# Patient Record
Sex: MALE | Race: WHITE | NOT HISPANIC OR LATINO | Employment: OTHER | URBAN - NONMETROPOLITAN AREA
[De-identification: names, ages, dates, MRNs, and addresses within clinical notes are randomized per-mention and may not be internally consistent; named-entity substitution may affect disease eponyms.]

---

## 2017-07-05 ENCOUNTER — HOSPITAL ENCOUNTER (OUTPATIENT)
Dept: RADIOLOGY | Facility: OTHER | Age: 57
End: 2017-07-05
Attending: FAMILY MEDICINE

## 2017-07-05 ENCOUNTER — HISTORY (OUTPATIENT)
Dept: FAMILY MEDICINE | Facility: OTHER | Age: 57
End: 2017-07-05

## 2017-07-05 ENCOUNTER — OFFICE VISIT - GICH (OUTPATIENT)
Dept: FAMILY MEDICINE | Facility: OTHER | Age: 57
End: 2017-07-05

## 2017-07-05 DIAGNOSIS — R05.9 COUGH: ICD-10-CM

## 2017-07-05 DIAGNOSIS — J20.9 ACUTE BRONCHITIS: ICD-10-CM

## 2017-12-07 ENCOUNTER — HISTORY (OUTPATIENT)
Dept: FAMILY MEDICINE | Facility: OTHER | Age: 57
End: 2017-12-07

## 2017-12-07 ENCOUNTER — HOSPITAL ENCOUNTER (OUTPATIENT)
Dept: RADIOLOGY | Facility: OTHER | Age: 57
End: 2017-12-07
Attending: NURSE PRACTITIONER

## 2017-12-07 ENCOUNTER — OFFICE VISIT - GICH (OUTPATIENT)
Dept: FAMILY MEDICINE | Facility: OTHER | Age: 57
End: 2017-12-07

## 2017-12-07 DIAGNOSIS — R05.9 COUGH: ICD-10-CM

## 2017-12-07 DIAGNOSIS — J40 BRONCHITIS: ICD-10-CM

## 2017-12-20 ENCOUNTER — AMBULATORY - GICH (OUTPATIENT)
Dept: ORTHOPEDICS | Facility: OTHER | Age: 57
End: 2017-12-20

## 2017-12-20 DIAGNOSIS — M25.511 PAIN IN RIGHT SHOULDER: ICD-10-CM

## 2017-12-27 NOTE — PROGRESS NOTES
"Patient Information     Patient Name MRN Denton Matthews [id number] Male 1960      Progress Notes by Marcus Londono MD at 2017  9:15 AM     Author:  Marcus Londono MD Service:  (none) Author Type:  Physician     Filed:  2017 10:18 AM Encounter Date:  2017 Status:  Signed     :  Marcus Londono MD (Physician)            SUBJECTIVE:  Denton Palacios is a 56 y.o. male who presents for evaluation of cough. Over the past couple of weeks he's noted a sore throat and chest congestion. This doesn't seem to bother him much during the day but when he lies down at night, he will develop a cough productive of green sputum. He denies fever or shortness of breath. Denies a history of seasonal allergies.    Allergies     Allergen  Reactions     Morphine Sulfate Nausea And Vomiting    and   Current Outpatient Prescriptions on File Prior to Visit       Medication  Sig Dispense Refill     ibuprofen (ADVIL; MOTRIN) 800 mg tablet Take 1 tablet by mouth 3 times daily if needed. 50 tablet 2     No current facility-administered medications on file prior to visit.        OBJECTIVE:  /76  Pulse 56  Temp 98.4  F (36.9  C) (Tympanic)  Ht 1.905 m (6' 3\")  Wt (!) 137.4 kg (303 lb)  BMI 37.87 kg/m2  EXAM:  General Appearance: Pleasant, alert, appropriate appearance for age. No acute distress  Eye Exam: Normal external eye, conjunctiva, lids, cornea. SHAHZAD.  Ear Exam: Normal TM's bilaterally. Normal auditory canals and external ears. Non-tender.  Nose Exam: Normal external nose, mucus membranes, and septum.  OroPharynx Exam: Dental hygiene adequate. Normal buccal mucosa. Normal pharynx.  Neck Exam: Supple, no masses or nodes.  Chest/Respiratory Exam: A few crackles noted at the bases, otherwise clear.    Chest x-ray shows increased peribronchial markings otherwise is normal.    ASSESSMENT/Plan :      ICD-10-CM    1. Acute bronchitis, unspecified organism  Elected to place on a course of " azithromycin. He will follow-up if not improving.  J20.9 azithromycin (ZITHROMAX) 250 mg tablet   2. Persistent cough R05 XR CHEST 2 VIEWS PA AND LATERAL       Marcus Londono MD

## 2017-12-30 NOTE — NURSING NOTE
Patient Information     Patient Name MRN Sex Denton Lepe [id number] Male 1960      Nursing Note by Melva Olmedo LPN at 2017  9:15 AM     Author:  Melva Olmedo LPN Service:  (none) Author Type:  NURS- Licensed Practical Nurse     Filed:  2017  9:45 AM Encounter Date:  2017 Status:  Signed     :  Melva Olmedo LPN (NURS- Licensed Practical Nurse)            Denton Palacios is a 56 y.o. male here today for a sore throat and cough that has been ongoing for the last couple weeks. Reports that he coughs mostly at night when he lays down and the phlegm is green in color.  Melva Olmedo LPN.........2017   9:13 AM

## 2018-01-02 ENCOUNTER — HISTORY (OUTPATIENT)
Dept: ORTHOPEDICS | Facility: OTHER | Age: 58
End: 2018-01-02

## 2018-01-02 ENCOUNTER — AMBULATORY - GICH (OUTPATIENT)
Dept: ORTHOPEDICS | Facility: OTHER | Age: 58
End: 2018-01-02

## 2018-01-04 ENCOUNTER — HISTORY (OUTPATIENT)
Dept: ORTHOPEDICS | Facility: OTHER | Age: 58
End: 2018-01-04

## 2018-01-04 ENCOUNTER — OFFICE VISIT - GICH (OUTPATIENT)
Dept: ORTHOPEDICS | Facility: OTHER | Age: 58
End: 2018-01-04

## 2018-01-04 ENCOUNTER — HOSPITAL ENCOUNTER (OUTPATIENT)
Dept: RADIOLOGY | Facility: OTHER | Age: 58
End: 2018-01-04
Attending: ORTHOPAEDIC SURGERY

## 2018-01-04 DIAGNOSIS — M75.81 OTHER SHOULDER LESIONS, RIGHT SHOULDER: ICD-10-CM

## 2018-01-04 DIAGNOSIS — M19.011 PRIMARY OSTEOARTHRITIS OF RIGHT SHOULDER: ICD-10-CM

## 2018-01-04 DIAGNOSIS — M25.511 PAIN IN RIGHT SHOULDER: ICD-10-CM

## 2018-01-04 DIAGNOSIS — M75.41 IMPINGEMENT SYNDROME OF RIGHT SHOULDER: ICD-10-CM

## 2018-01-16 ENCOUNTER — HOSPITAL ENCOUNTER (OUTPATIENT)
Dept: RADIOLOGY | Facility: OTHER | Age: 58
End: 2018-01-16
Attending: ORTHOPAEDIC SURGERY

## 2018-01-16 ENCOUNTER — COMMUNICATION - GICH (OUTPATIENT)
Dept: ORTHOPEDICS | Facility: OTHER | Age: 58
End: 2018-01-16

## 2018-01-16 DIAGNOSIS — M75.81 OTHER SHOULDER LESIONS, RIGHT SHOULDER: ICD-10-CM

## 2018-01-16 DIAGNOSIS — M75.41 IMPINGEMENT SYNDROME OF RIGHT SHOULDER: ICD-10-CM

## 2018-01-16 DIAGNOSIS — M19.011 PRIMARY OSTEOARTHRITIS OF RIGHT SHOULDER: ICD-10-CM

## 2018-01-22 ENCOUNTER — HISTORY (OUTPATIENT)
Dept: ORTHOPEDICS | Facility: OTHER | Age: 58
End: 2018-01-22

## 2018-01-22 ENCOUNTER — OFFICE VISIT - GICH (OUTPATIENT)
Dept: ORTHOPEDICS | Facility: OTHER | Age: 58
End: 2018-01-22

## 2018-01-22 ENCOUNTER — COMMUNICATION - GICH (OUTPATIENT)
Dept: FAMILY MEDICINE | Facility: OTHER | Age: 58
End: 2018-01-22

## 2018-01-22 DIAGNOSIS — M75.121 COMPLETE TEAR OF RIGHT ROTATOR CUFF: ICD-10-CM

## 2018-01-25 ENCOUNTER — OFFICE VISIT - GICH (OUTPATIENT)
Dept: FAMILY MEDICINE | Facility: OTHER | Age: 58
End: 2018-01-25

## 2018-01-25 ENCOUNTER — HISTORY (OUTPATIENT)
Dept: FAMILY MEDICINE | Facility: OTHER | Age: 58
End: 2018-01-25

## 2018-01-25 DIAGNOSIS — M75.41 IMPINGEMENT SYNDROME OF RIGHT SHOULDER: ICD-10-CM

## 2018-01-25 DIAGNOSIS — G47.30 SLEEP APNEA: ICD-10-CM

## 2018-01-25 DIAGNOSIS — M75.121 COMPLETE TEAR OF RIGHT ROTATOR CUFF: ICD-10-CM

## 2018-01-25 DIAGNOSIS — Z01.818 ENCOUNTER FOR OTHER PREPROCEDURAL EXAMINATION: ICD-10-CM

## 2018-01-25 DIAGNOSIS — M19.011 PRIMARY OSTEOARTHRITIS OF RIGHT SHOULDER: ICD-10-CM

## 2018-01-25 DIAGNOSIS — M75.81 OTHER SHOULDER LESIONS, RIGHT SHOULDER: ICD-10-CM

## 2018-01-25 LAB
A/G RATIO - HISTORICAL: 1.8 (ref 1–2)
ABSOLUTE BASOPHILS - HISTORICAL: 0.1 THOU/CU MM
ABSOLUTE EOSINOPHILS - HISTORICAL: 0.2 THOU/CU MM
ABSOLUTE IMMATURE GRANULOCYTES(METAS,MYELOS,PROS) - HISTORICAL: 0 THOU/CU MM
ABSOLUTE LYMPHOCYTES - HISTORICAL: 2.3 THOU/CU MM (ref 0.9–2.9)
ABSOLUTE MONOCYTES - HISTORICAL: 0.6 THOU/CU MM
ABSOLUTE NEUTROPHILS - HISTORICAL: 4.8 THOU/CU MM (ref 1.7–7)
ALBUMIN SERPL-MCNC: 4.6 G/DL (ref 3.5–5.7)
ALP SERPL-CCNC: 70 IU/L (ref 34–104)
ALT (SGPT) - HISTORICAL: 28 IU/L (ref 7–52)
ANION GAP - HISTORICAL: 10 (ref 5–18)
AST SERPL-CCNC: 18 IU/L (ref 13–39)
BASOPHILS # BLD AUTO: 0.6 %
BILIRUB SERPL-MCNC: 0.7 MG/DL (ref 0.3–1)
BUN SERPL-MCNC: 19 MG/DL (ref 7–25)
BUN/CREAT RATIO - HISTORICAL: 17
CALCIUM SERPL-MCNC: 9.4 MG/DL (ref 8.6–10.3)
CHLORIDE SERPLBLD-SCNC: 108 MMOL/L (ref 98–107)
CO2 SERPL-SCNC: 26 MMOL/L (ref 21–31)
CREAT SERPL-MCNC: 1.12 MG/DL (ref 0.7–1.3)
EOSINOPHIL NFR BLD AUTO: 2.7 %
ERYTHROCYTE [DISTWIDTH] IN BLOOD BY AUTOMATED COUNT: 13.5 % (ref 11.5–15.5)
GFR IF NOT AFRICAN AMERICAN - HISTORICAL: >60 ML/MIN/1.73M2
GLOBULIN - HISTORICAL: 2.6 G/DL (ref 2–3.7)
GLUCOSE SERPL-MCNC: 111 MG/DL (ref 70–105)
HCT VFR BLD AUTO: 47.7 % (ref 37–53)
HEMOGLOBIN: 16.2 G/DL (ref 13.5–17.5)
IMMATURE GRANULOCYTES(METAS,MYELOS,PROS) - HISTORICAL: 0.2 %
LYMPHOCYTES NFR BLD AUTO: 29.1 % (ref 20–44)
MCH RBC QN AUTO: 29.6 PG (ref 26–34)
MCHC RBC AUTO-ENTMCNC: 34 G/DL (ref 32–36)
MCV RBC AUTO: 87 FL (ref 80–100)
MONOCYTES NFR BLD AUTO: 7.3 %
NEUTROPHILS NFR BLD AUTO: 60.1 % (ref 42–72)
PLATELET # BLD AUTO: 233 THOU/CU MM (ref 140–440)
PMV BLD: 10.5 FL (ref 6.5–11)
POTASSIUM SERPL-SCNC: 4.2 MMOL/L (ref 3.5–5.1)
PROT SERPL-MCNC: 7.2 G/DL (ref 6.4–8.9)
RED BLOOD COUNT - HISTORICAL: 5.47 MIL/CU MM (ref 4.3–5.9)
SODIUM SERPL-SCNC: 144 MMOL/L (ref 133–143)
WHITE BLOOD COUNT - HISTORICAL: 8.1 THOU/CU MM (ref 4.5–11)

## 2018-01-25 ASSESSMENT — ANXIETY QUESTIONNAIRES
1. FEELING NERVOUS, ANXIOUS, OR ON EDGE: NOT AT ALL
4. TROUBLE RELAXING: NOT AT ALL
6. BECOMING EASILY ANNOYED OR IRRITABLE: NOT AT ALL
3. WORRYING TOO MUCH ABOUT DIFFERENT THINGS: NOT AT ALL
5. BEING SO RESTLESS THAT IT IS HARD TO SIT STILL: NOT AT ALL
7. FEELING AFRAID AS IF SOMETHING AWFUL MIGHT HAPPEN: NOT AT ALL
2. NOT BEING ABLE TO STOP OR CONTROL WORRYING: NOT AT ALL
GAD7 TOTAL SCORE: 0

## 2018-01-25 ASSESSMENT — PATIENT HEALTH QUESTIONNAIRE - PHQ9: SUM OF ALL RESPONSES TO PHQ QUESTIONS 1-9: 0

## 2018-01-26 ENCOUNTER — DOCUMENTATION ONLY (OUTPATIENT)
Dept: FAMILY MEDICINE | Facility: OTHER | Age: 58
End: 2018-01-26

## 2018-01-26 VITALS
SYSTOLIC BLOOD PRESSURE: 130 MMHG | BODY MASS INDEX: 37.67 KG/M2 | HEART RATE: 56 BPM | TEMPERATURE: 98.4 F | HEIGHT: 75 IN | WEIGHT: 303 LBS | DIASTOLIC BLOOD PRESSURE: 76 MMHG

## 2018-01-26 PROBLEM — M19.011 ARTHRITIS OF RIGHT ACROMIOCLAVICULAR JOINT: Status: ACTIVE | Noted: 2018-01-02

## 2018-01-26 PROBLEM — M75.81 RIGHT ROTATOR CUFF TENDINITIS: Status: ACTIVE | Noted: 2018-01-02

## 2018-01-26 PROBLEM — M75.41 IMPINGEMENT SYNDROME OF RIGHT SHOULDER: Status: ACTIVE | Noted: 2018-01-02

## 2018-01-26 RX ORDER — IBUPROFEN 800 MG/1
1 TABLET, FILM COATED ORAL 3 TIMES DAILY PRN
COMMUNITY
Start: 2013-06-13 | End: 2018-05-15

## 2018-02-01 ENCOUNTER — HISTORY (OUTPATIENT)
Dept: SURGERY | Facility: OTHER | Age: 58
End: 2018-02-01

## 2018-02-08 ENCOUNTER — HOSPITAL ENCOUNTER (OUTPATIENT)
Dept: SURGERY | Facility: OTHER | Age: 58
Discharge: HOME | End: 2018-02-08
Attending: ORTHOPAEDIC SURGERY | Admitting: ORTHOPAEDIC SURGERY

## 2018-02-08 ENCOUNTER — HISTORY (OUTPATIENT)
Dept: SURGERY | Facility: OTHER | Age: 58
End: 2018-02-08

## 2018-02-08 ENCOUNTER — SURGERY (OUTPATIENT)
Dept: SURGERY | Facility: OTHER | Age: 58
End: 2018-02-08

## 2018-02-08 ENCOUNTER — DOCUMENTATION ONLY (OUTPATIENT)
Dept: FAMILY MEDICINE | Facility: OTHER | Age: 58
End: 2018-02-08

## 2018-02-08 DIAGNOSIS — Z98.890 OTHER SPECIFIED POSTPROCEDURAL STATES: ICD-10-CM

## 2018-02-09 VITALS
DIASTOLIC BLOOD PRESSURE: 78 MMHG | OXYGEN SATURATION: 97 % | HEART RATE: 48 BPM | SYSTOLIC BLOOD PRESSURE: 126 MMHG | BODY MASS INDEX: 39.73 KG/M2 | HEIGHT: 74 IN | WEIGHT: 309.6 LBS

## 2018-02-09 VITALS — HEART RATE: 60 BPM | SYSTOLIC BLOOD PRESSURE: 136 MMHG | DIASTOLIC BLOOD PRESSURE: 74 MMHG

## 2018-02-09 VITALS
HEIGHT: 75 IN | DIASTOLIC BLOOD PRESSURE: 78 MMHG | WEIGHT: 312 LBS | BODY MASS INDEX: 38.79 KG/M2 | SYSTOLIC BLOOD PRESSURE: 132 MMHG | HEART RATE: 58 BPM | TEMPERATURE: 98.7 F

## 2018-02-09 VITALS — HEART RATE: 60 BPM | DIASTOLIC BLOOD PRESSURE: 90 MMHG | SYSTOLIC BLOOD PRESSURE: 142 MMHG

## 2018-02-10 ASSESSMENT — ANXIETY QUESTIONNAIRES: GAD7 TOTAL SCORE: 0

## 2018-02-10 ASSESSMENT — PATIENT HEALTH QUESTIONNAIRE - PHQ9: SUM OF ALL RESPONSES TO PHQ QUESTIONS 1-9: 0

## 2018-02-12 NOTE — NURSING NOTE
Patient Information     Patient Name MRN Denton Matthews [id number] Male 1960      Nursing Note by Heather Waldrop at 2018  9:45 AM     Author:  Heather Wladrop Service:  (none) Author Type:  (none)     Filed:  2018 10:21 AM Encounter Date:  2018 Status:  Signed     :  Heather Waldrop            Patient states BP is high this morning do to wife.  She got him wond up  Heather Waldrop LPN 2018 9:52 AM

## 2018-02-12 NOTE — PROGRESS NOTES
Patient Information     Patient Name MRN Sex Denton Lepe [id number] Male 1960      Progress Notes by Edward Jain DO at 2018  9:45 AM     Author:  Edward Jain DO Service:  (none) Author Type:  Physician     Filed:  2018 10:21 AM Encounter Date:  2018 Status:  Signed     :  Edward Jain DO (Physician)            PROGRESS NOTE    SUBJECTIVE:  Denton Palacios is here for evaluation in regards to his right shoulder. I previously saw the patient for elbow discomfort about 2 years ago. He comes in today after he fell onto his right shoulder . He came down on his elbow and jammed his shoulder. He thought it would get better but is continued to bother him. It's more painful especially when sleeping at night. He does utilize an occasional anti-inflammatory. Pain can be a dull ache at times more sharp. He is here today to discuss options. He is continue to work on his motion.    REVIEW OF SYSTEMS:  The review of systems as documented in the HPI and on the intake questionnaire completed by the patient on 2018 it has been reviewed by myself and the pertinent positives and negatives addressed.  The remainder of the complete review of systems was non-contributory.    OBJECTIVE:  /90 (Cuff Site: Right Arm, Position: Sitting, Cuff Size: Adult Large)  Pulse 60 There is no height or weight on file to calculate BMI.    General Appearance: Pleasant male in good appearance, mood and affect.  Alert and orientated times three ( time, date and location).    Skin: Intact.    Shoulder:  Motion: Patient does have full active forward flexion abduction is about 5  short passively it is full. Internal rotation to his back pocket with discomfort, external rotation to 45 .  Hawkin's Sign: positive.  Neer's Sign: positive.  Cross body adduction:  positive.  Drop arm test: negative.  Weakness at waist level: positive, slight difference when compared to the opposite  side.  Bicipital testing: negative.  Lift off test:  negative.  Hinojosa's test:  negative.    Elbow:  Flexion: Normal.  Extension: Normal.    Hand:  Thenar wasting: no.  Hypothenar wasting: no.  Sensation: Normal.  Radial and ulnar blood flow:  Normal.    Eyes: Pupils are round..    Ears: Hearing: Intact.    Heart: He has normal capillary refill into his hands pulses are regular.    Lungs: Distant breath sounds.     Radiographic images from 1/4 where independently reviewed and discussed with the patient.     X-rays do demonstrate a type II acromial hook, there is calcific tendinitis into the shoulder noted on the outlet view and he has narrowing of the acromioclavicular joint with significant osteophyte formation. Humeral head is in appropriate position.    ASSESSMENT:    Right small amount of calcific tendinitis.  Right impingement syndrome.  Right acromioclavicular arthritis.  Right rotator cuff tendinitis with possible partial tear.    PLAN:    I discussed conservative and surgical options with the patient at this time will work with further workup.  I will order an MR arthrogram of the shoulder.  He was instructed on wall walking and was able to perform.  I reviewed the poster board in model with the patient to help them understand impingement type symptoms in his pathology he verbalized an understanding.  He will follow up after above.  Questions and concerns were answered.    Edward Jain D.O.  Orthopaedic Surgeon    [facility]  [address] [place] [address]  Phone [phone] (KNEE)  Fax [phone]    This document was created using computer generated templates and voice activated software.    10:16 AM 1/4/2018

## 2018-02-12 NOTE — NURSING NOTE
Patient Information     Patient Name MRN Denton Matthews [id number] Male 1960      Nursing Note by Shanel Ochoa at 2017 10:30 AM     Author:  Shanel Ochoa Service:  (none) Author Type:  (none)     Filed:  2017 10:47 AM Encounter Date:  2017 Status:  Signed     :  Shanel Ochoa            Patient presents in the clinic with concerns of chest congestion, cough, sinus pressure, ear pain and ringing in the left ear. Patient states this has been occurring for 6-8 weeks. Patient states he has used OTC cold medications. Patient also uses a CPAP machine at home.  Shanel Ochoa LPN............................ 2017 10:38 AM

## 2018-02-12 NOTE — PROGRESS NOTES
Patient Information     Patient Name MRN Sex Denton Lepe [id number] Male 1960      Progress Notes by Tiffanie Ferreira NP at 2017 10:30 AM     Author:  Tiffanie Ferreira NP Service:  (none) Author Type:  PHYS- Nurse Practitioner     Filed:  2017 11:31 AM Encounter Date:  2017 Status:  Signed     :  Tiffanie Ferreira NP (PHYS- Nurse Practitioner)            HPI:    Denton Palacios is a 57 y.o. male who presents to clinic today for URI.   Cough, chest congestion, right ear plugged and ringing for the past 6-8 weeks.  Congested cough.  Occasionally productive.  Mild upper chest tightness.  Increasely feeling winded with stairs.  No shortness of breath with flat walking.  Denies any chest pain, tightness, or heaviness with activity.  No pain with coughing or deep breathing.  States symptoms seem to improve at night when using CPAP machine but then bothersome during the daytime.  Denies any lower extremity edema.   No fevers.  No chills or sweats.  No runny or stuffy nose.  Sore throat initially, resolved.  Infrequent occasional headache - occurs when not sleeping well and resolves with a short nap.  No body aches.  No change in appetite.  Energy level is the same.  Tried Nyquil the first week of symptoms, none recently.  No flu shot.            Past Medical History:     Diagnosis  Date     Olecranon bursitis of right elbow 2016     Pneumonia, organism unspecified(486) 2012     Past Surgical History:      Procedure  Laterality Date     LEG/ANKLE SURGERY  13    quadraceps tear times 2       Social History       Substance Use Topics         Smoking status:   Never Smoker     Smokeless tobacco:   Never Used     Alcohol use   No      Comment:        Current Outpatient Prescriptions       Medication  Sig Dispense Refill     ibuprofen (ADVIL; MOTRIN) 800 mg tablet Take 1 tablet by mouth 3 times daily if needed. 50 tablet 2     No current facility-administered medications for  "this visit.      Medications have been reviewed by me and are current to the best of my knowledge and ability.    Allergies     Allergen  Reactions     Morphine Sulfate Nausea And Vomiting       Past medical history, past surgical history, current medications and allergies reviewed and accurate to the best of my knowledge.        ROS:  Refer to HPI    /78  Pulse 58  Temp 98.7  F (37.1  C) (Tympanic)   Ht 1.905 m (6' 3\")  Wt (!) 141.5 kg (312 lb)  BMI 39 kg/m2    EXAM:  General Appearance: Well appearing adult male, appropriate appearance for age. No acute distress  Head: normocephalic, atraumatic  Ears: Left TM with bony landmarks appreciated, no erythema, no effusion, no bulging, no purulence.  Right TM with bony landmarks appreciated, no erythema, no effusion, no bulging, no purulence.   Left auditory canal clear.  Right auditory canal clear.  Normal external ears, non tender.  Eyes: conjunctivae normal without erythema or irritation, no drainage or crusting, no eyelid swelling, pupils equal   Orophayrnx: moist mucous membranes, posterior pharynx without erythema, tonsils without hypertrophy, no erythema, no exudates or petechiae, no post nasal drip seen.    Neck: supple without adenopathy  Respiratory: normal chest wall and respirations.  Normal effort.  Clear to auscultation bilaterally, no wheezing, crackles or rhonchi.  No increased work of breathing.  No cough appreciated.  Cardiac: RRR with no murmurs  Musculoskeletal:  Normal gait.  Equal movement of bilateral upper extremities.  Equal movement of bilateral lower extremities.    Dermatological: no rashes noted of exposed skin  Psychological: normal affect, alert and pleasant      Xray:  PROCEDURE:  XR CHEST 2 VIEWS PA AND LATERAL  HISTORY: Persistent cough.  COMPARISON:  07/05/2017, 11/24/2007  FINDINGS:   The cardiac silhouette is normal in size. The pulmonary vasculature is normal.  The lungs are clear. No pleural effusion or " pneumothorax.  IMPRESSION:  No acute cardiopulmonary disease.    Electronically Signed By: Stacie Whalen M.D. on 12/7/2017 11:20 AM      ASSESSMENT/PLAN:    ICD-10-CM    1. Persistent cough R05 XR CHEST 2 VIEWS PA AND LATERAL      azithromycin (ZITHROMAX Z-SHONNA) 250 mg tablet   2. Bronchitis J40 azithromycin (ZITHROMAX Z-SHONNA) 250 mg tablet         CXR completed and reviewed, no infiltrate appreciated, radiologist over read normal  Azithromycin daily x 5 days (z shonna dosing) for persistent cough/bronchitis   Encouraged fluids  Symptomatic treatment - salt water gargles, honey, elevation, humidifier, sinus rinse/netti pot, lozenges, mucinex, etc   Tylenol  PRN  Follow up with PCP  if symptoms persist or worsen or concerns          Patient Instructions   Azithromycin daily x 5 days       Most coughs are caused by a viral infection.   Usually coughs can last 2 to 3 weeks. Sometimes the cough becomes loose (wet) for a few days, and your child coughs up a lot of phlegm (mucus). This is usually a sign that the end of the illness is near.    Most sore throats are caused by viruses and are part of a cold. About 10% of sore throats are caused by strep bacteria.    Encouraged fluids and rest.    May use symptomatic care with tylenol or ibuprofen.     Using a humidifier works well to break up the congestion.     Elevate the mattress to 15 degrees in order to help with the congestion.    Frequent swallows of cool liquid.      Oatmeal or honey coats the throat and some patients find it soothes the pain.     Salt water gargles as needed    Return to clinic with change/worsening of symptoms or concerns.

## 2018-02-12 NOTE — PATIENT INSTRUCTIONS
Patient Information     Patient Name MRN Denton Matthews [id number] Male 1960      Patient Instructions by Tiffanie Ferreira NP at 2017 10:30 AM     Author:  Tiffanie Ferreira NP  Service:  (none) Author Type:  PHYS- Nurse Practitioner     Filed:  2017 11:19 AM  Encounter Date:  2017 Status:  Addendum     :  Tiffanie Ferreira NP (PHYS- Nurse Practitioner)        Related Notes: Original Note by Tiffanie Ferreira NP (PHYS- Nurse Practitioner) filed at 2017 11:18 AM            Azithromycin daily x 5 days       Most coughs are caused by a viral infection.   Usually coughs can last 2 to 3 weeks. Sometimes the cough becomes loose (wet) for a few days, and your child coughs up a lot of phlegm (mucus). This is usually a sign that the end of the illness is near.    Most sore throats are caused by viruses and are part of a cold. About 10% of sore throats are caused by strep bacteria.    Encouraged fluids and rest.    May use symptomatic care with tylenol or ibuprofen.     Using a humidifier works well to break up the congestion.     Elevate the mattress to 15 degrees in order to help with the congestion.    Frequent swallows of cool liquid.      Oatmeal or honey coats the throat and some patients find it soothes the pain.     Salt water gargles as needed    Return to clinic with change/worsening of symptoms or concerns.

## 2018-02-13 ENCOUNTER — HOSPITAL ENCOUNTER (OUTPATIENT)
Dept: PHYSICAL THERAPY | Facility: OTHER | Age: 58
Setting detail: THERAPIES SERIES
End: 2018-02-13
Attending: ORTHOPAEDIC SURGERY
Payer: MEDICARE

## 2018-02-13 ENCOUNTER — OFFICE VISIT (OUTPATIENT)
Dept: ORTHOPEDICS | Facility: OTHER | Age: 58
End: 2018-02-13
Attending: ORTHOPAEDIC SURGERY
Payer: MEDICARE

## 2018-02-13 VITALS
DIASTOLIC BLOOD PRESSURE: 80 MMHG | HEART RATE: 48 BPM | SYSTOLIC BLOOD PRESSURE: 162 MMHG | HEIGHT: 74 IN | BODY MASS INDEX: 38.5 KG/M2 | WEIGHT: 300 LBS

## 2018-02-13 DIAGNOSIS — Z98.890 S/P ARTHROSCOPY OF SHOULDER: Primary | ICD-10-CM

## 2018-02-13 PROCEDURE — 99024 POSTOP FOLLOW-UP VISIT: CPT | Performed by: ORTHOPAEDIC SURGERY

## 2018-02-13 PROCEDURE — 97161 PT EVAL LOW COMPLEX 20 MIN: CPT | Mod: GP | Performed by: PHYSICAL THERAPIST

## 2018-02-13 PROCEDURE — G0463 HOSPITAL OUTPT CLINIC VISIT: HCPCS

## 2018-02-13 PROCEDURE — G0463 HOSPITAL OUTPT CLINIC VISIT: HCPCS | Mod: 25

## 2018-02-13 PROCEDURE — G8985 CARRY GOAL STATUS: HCPCS | Mod: GP,CI | Performed by: PHYSICAL THERAPIST

## 2018-02-13 PROCEDURE — G8984 CARRY CURRENT STATUS: HCPCS | Mod: GP,CN | Performed by: PHYSICAL THERAPIST

## 2018-02-13 PROCEDURE — 40000185 ZZHC STATISTIC PT OUTPT VISIT: Performed by: PHYSICAL THERAPIST

## 2018-02-13 ASSESSMENT — PAIN SCALES - GENERAL: PAINLEVEL: MILD PAIN (2)

## 2018-02-13 NOTE — TELEPHONE ENCOUNTER
Patient Information     Patient Name MRN Denton Matthews [id number] Male 1960      Telephone Encounter by Martha Song at 2018  1:14 PM     Author:  Martha Song Service:  (none) Author Type:  (none)     Filed:  2018  1:14 PM Encounter Date:  2018 Status:  Signed     :  Martha Song            Called patient's wife and put patient's name in the book for .  Martha Song LPN .......2018 1:14 PM

## 2018-02-13 NOTE — PROGRESS NOTES
"PROGRESS NOTE    SUBJECTIVE:  Denton Palacios is here for recheck in evaluation of right shoulder.  He is doing very well with manageable discomfort.    OBJECTIVE:  /80 (BP Location: Left arm, Patient Position: Sitting, Cuff Size: Adult Large)  Pulse (!) 48  Ht 1.88 m (6' 2\")  Wt 136.1 kg (300 lb)  BMI 38.52 kg/m2 Body mass index is 38.52 kg/(m^2).    General Appearance: Pleasant 57 year old male in good appearance, mood and affect.  Alert and orientated times three ( time, date and location).    Incision Clean, dry, and intact.    Shoulder:  Motion: Not tested today.  Strength: Expected weakness.    Elbow:  Motion: Full motion.    Hand:  Thenar wasting: no.  Hypothenar wasting: no.  Sensation: Intact.  Radial and ulnar blood flow:  normal.    Eyes: Pupils are round.    Ears: Hearing: Intact.    Heart: good capillary refill into his hands pulses are regular.    Lungs: Distant breath sounds.    Arthroscopy pictures and op note where reviewed with the patient and copies given.    IMPRESSION:    POSTOPERATIVE DIAGNOSES:  1.  Tear of the supraspinatus and infraspinatus, right shoulder.  2.  Impingement syndrome.  3.  Acromioclavicular arthritis.  4.  Bicipital partial tearing.  5.  Degenerative labral tearing.  6.  Excellent cartilage, humeral head and glenoid.     PROCEDURES:  1.  Right shoulder arthroscopy with extensive debridement to include 2% anterior to posterior labrum done from the glenohumeral joint (DOS 02/08/2018).  2.  Extensive debridement, 4% of the supraspinatus and infraspinatus done from the subacromial space.  3.  Arthroscopic biceps tenotomy.  4.  Arthroscopic subacromial decompression.  5.  Arthroscopic distal clavicle excision, approximately 15 mm width of resection completed.  6.  Arthroscopic rotator cuff repair utilizing 1 medial row 5.5 mm BioComposite SwiveLock anchor, 2 lateral row 5.5 mm BioComposite Arthrex SwiveLock anchors, and 3 FiberWire sutures, and 1 " FiberTape.    PLAN:  Suture removal and wound care where completed.  Patient will work with therapy.  Elbow motions where reviewed.  His questions and concerns were answered.  He will follow-up in 6 1/2 weeks and will need PT notes.    Evaluate and treat.  Modalities as needed.    See operative note for full details of procedure.    Start rehab now:  Phase 1 - no passive ROM at shoulder.  Phase 2A - on 3/8.  Phase 2B - on 3/22 -out of pillow in the daytime, wall walk 1X every hour awake.  Focus is FULL PASSIVE FORWARD FLEXION by 8 weeks post op. Continue to wear pillow at night.  Phase 3 - on 4/5 - out of all gear then.    Edward Jain D.O.  Orthopaedic Surgeon    [facility]  [address] [place] [address]  Phone [phone] (KNEE)  Fax [phone]    Disclaimer:  This note consists of words and symbols derived from keyboarding, dictation, or using voice recognition software. As a result, there may be errors in the script that have gone undetected. Please consider this when interpreting information found in this note.    9:05 AM 2/13/2018

## 2018-02-13 NOTE — PROCEDURES
Patient Information     Patient Name MRN Sex Denton Lepe [id number] Male 1960      Procedures signed by Edward Jain DO at 2018  5:25 PM      Author:  Edward Jain DO Service:  (none) Author Type:  Physician     Filed:  2018  5:25 PM Creation Time:  2018  2:26 PM Status:  Signed     :  Edward Jain DO (Physician)            DATE OF SERVICE:  2018    SURGEON:    Edward Jain DO    ASSISTANT:   None.    PREOPERATIVE DIAGNOSES:  1.  Complete tear of the supraspinatus and infraspinatus, right shoulder.  2.  Impingement syndrome.  3.  Acromioclavicular arthritis.    POSTOPERATIVE DIAGNOSES:  1.  Tear of the supraspinatus and infraspinatus, right shoulder.  2.  Impingement syndrome.  3.  Acromioclavicular arthritis.  4.  Bicipital partial tearing.  5.  Degenerative labral tearing.  6.  Excellent cartilage, humeral head and glenoid.    PROCEDURES:  1.  Right shoulder arthroscopy with extensive debridement to include 2% anterior to posterior labrum done from the glenohumeral joint (DOS 2018).  2.  Extensive debridement, 4% of the supraspinatus and infraspinatus done from the subacromial space.  3.  Arthroscopic biceps tenotomy.  4.  Arthroscopic subacromial decompression.  5.  Arthroscopic distal clavicle excision, approximately 15 mm width of resection completed.  6.  Arthroscopic rotator cuff repair utilizing 1 medial row 5.5 mm BioComposite SwiveLock anchor, 2 lateral row 5.5 mm BioComposite Arthrex SwiveLock anchors, and 3 FiberWire sutures, and 1 FiberTape.    IMPLANTS:  As above, 3 anchors.     ANESTHESIA:  General via LMA, and also interscalene block for pain control.    ESTIMATED BLOOD LOSS:  Less than 5 mL.    FLUIDS:  See chart.    DRAINS:  None.    COMPLICATIONS:  None.    DISPOSITION:  Stable to postop.    INDICATIONS FOR PROCEDURE:  The patient is a 57-year-old male with ongoing complaints of pain about his right shoulder.  He  had ongoing discomfort, had failed conservative measures, underwent an MRI which showed a torn rotator cuff.  He elected to go ahead with operative intervention.    PROCEDURE NOTE:  After informed consent was received from the patient, having listed all the risks and benefits as noted on the consent form, but not limited to the consent form, having discussed all conservative, surgical, and alternatives to treatment, the patient signed consent form with a witness present.  The patient understood there were numerous risks, all of them were not written down, but were discussed at length.  No guarantees were given.  All questions were answered prior to signing the consent form.  The patient was given antibiotics one hour prior to prior skin incision.      He was taken back to the operating room supine on a gurney, transferred to the operating room table, secured, and all bony prominences were padded.  He was then given general anesthesia via LMA.  He had received his preop interscalene block in preop.  The patient was then given general anesthesia via LMA.  Once proper anesthesia was obtained, I examined his shoulder, showing him to have good stability.  He was repositioned in beach chair, head and neck positioned by anesthesia, bony prominences checked, padded once again, and he was secured to the operating room table.  The patient's right upper extremity was sterilely prepped and draped.    Timeout was performed according to institutional guidelines.  With this done, I then marked my incision site, and made my posterior skin incision with a #11 scalpel.  Blunt trocar and cannula were inserted, trocar removed, and a Linvatec viewing lens was placed.  Beginning in the glenohumeral joint, he was noted to have good cartilage of the humeral head and glenoid.  Biceps had some fraying on it.  There was some hyper-synovial tissue noted, labral degenerative tearing noted.  A large tear of the supraspinatus and infraspinatus,  but it was more medial and closer to the curvature of the humeral head.  He still had very good lateral tissue on the supraspinatus and infraspinatus.  No loose bodies in the inferior pouch.      Utilizing inside-out technique, I made my anterior portal site.  With the probe in place, I once again noted the above-mentioned structures.  I then inserted a shaver and performed extensive debridement; 2% of the anterior to posterior labrum was removed.  With this back to a stable edge, I then checked the long head of the biceps.  As I put tension onto it, I was able to bring the extraarticular portion into view.  He had tearing of that long head of the biceps.  It was frayed out in this area.  I elected to perform a biceps tenotomy, and this was done with an arthroscopic scissor.  I then debrided the stump of the biceps.  I also felt that the repair site was going to interfere with his long head of the biceps, so therefore that is the other reason I elected to perform a tenotomy.  With this done, I then brought my viewing lens anteriorly, looked at the posterior labrum.  I was happy with my debridement.  I removed my instruments.    I repositioned my instruments into the subacromial space.  I performed extensive debridement and bursectomy.  I was able to note the large type 2 acromial hook.  He also had this very large tear and as noted, there was a significant amount of tissue still attached on the lateral greater tuberosity.  It was excellent tissue.  I made a lateral portal site under direct visualization, brought my viewing lens laterally, and brought my bur in then posteriorly, and performed a subacromial decompression.  I reversed my viewing and working portals, and removed more of the anterolateral corner of the acromion until I had it back to a type 1 reconfiguration.  I then removed the inferior aspect of the distal clavicle which had large osteophytes, and then brought the bur in anteriorly and performed the  last of the distal clavicle excision with approximately 15 mm width of resection completed.      Having completed this, I now turned my attention back to the rotator cuff.  I made a more anterolateral portal site as well as a more posterior accessory portal.  I placed 1 Passport.  I proceeded to do a convergence stitch, basically bringing the more posterior lateral components of the supraspinatus and infraspinatus back together.  I tied this arthroscopically.  I then placed one 5.5 mm BioComposite SwiveLock anchor along the bare area, and then passed both suture limbs of the FiberTape.  With this done, I then passed suture through the medial part of the tendon and also the lateral part of the tendon, and then placed those on the anterior and posterior aspects to be utilized once I tied them into the repair, since he did have good tissue.      I then turned my attention to the anterior side of the rotator cuff and tied the sutures and then brought the sutures together with the fibercord and placed a lateral row 5.5 mm BioComposite SwiveLock anchor.  This good fixation.       I then arthroscopically tied my posterior sutures.  I then brought them together with the FiberTape and placed the 3rd l 5.5 mm BioComposite SwiveLock anchor which was lateral according to 's specifications. This gave excellent coverage to his rotator cuff, and nice end-to-end fixation as well as it being reenforced.  I then irrigated everything copiously and removed my instruments.    Portal sites were closed deep with 3-0 Monocryl interrupted and all 5 skin incisions were closed with 3-0 nylon in modified horizontal fashion.    Sterile dressings including Xeroform, 4 x 4, and Tegaderm were placed, and the patient was placed in a Slingshot 3, extubated, transferred back to the [facility], and taken to recovery room in satisfactory condition.  He will be discharged home per protocol.  He is given prescriptions for Zofran, Keflex, Norco  10/325 #60, no refills, and aspirin.  He will follow up in office as already arranged.  I had explained to him and his wife prior to surgery how to do his elbow exercises, and that he has not put any load into his shoulder.      DO TUYET GONZALEZ/carter  D:02/08/2018 13:30:22  T:02/08/2018 14:25:46  VJID: [id number]  TJID: [id number]    cc:

## 2018-02-13 NOTE — OR ANESTHESIA
Patient Information     Patient Name MRN Sex Denton Lepe [id number] Male 1960      OR Anesthesia by Hugo Colon DO at 2018  4:04 PM     Author:  Hugo Colon DO Service:  (none) Author Type:  PHYS- Anesthesiologist     Filed:  2018  4:04 PM Date of Service:  2018  4:04 PM Status:  Signed     :  Hugo Colon DO (PHYS- Anesthesiologist)            Anesthesia Post Operative Care Note    Name: Denton Palacios  MRN:   [id number]  :    1960       Procedure Done:  See Surgeon Note        Anesthesia Technique    Anesthetic Type:  General     Airway Management:  LMA     Oral Trauma:  No    Intraoperative Course   Hemodynamics:  Stable    Ventilation Normal:  Yes Lung Sounds:  Normal      PACU Course    Airway Status:  Extubated     Nondepolarizer Used:       Reversed: N/A   Hemodynamics:  Stable      Hydration: Euvolemic   Temperature:  36.1 - 38.3      Mental Status:  Awake, alert, follows commands   Pain Management:  Adequate     Regional Block:  Yes     Regional Block Outcome:  Adequate   Anesthesia Complications:  None      Vital Signs:  Temp: 97.4  F (36.3  C)  Pulse: 94  BP: 154/90  Resp: 20  SpO2: 90 %    O2 Device: Room Air         Level of Nausea: None        Active Lines:  Patient Lines/Drains/Airways Status    Active Line     None                Intake & Output:  Date  18 - 18 0659(Not Admitted)   18 0700 - 18 0659      Shift  1615-3606 2207-0030 24 Hour Total 7231-5736 9933-0415 2530-1867 24 Hour Total   I  N  T  A  K  E   Oral/Enteral     360  360       +I/O+    Oral     360  360    Intravenous    1000 700  1700       +I/O+  Maint IV (lactated Ringers infusion)    1000 700  1700    Shift Total    1000 1060  2060   O  U  T  P  U  T   Shift Total          NET     1000 1060  2060   Weight (kg)                 Labs:  No results for input(s): PL9IFPQHJUL, CHM2YICYTWZP, PHARTERIAL, QAE4CPXOWWGM, L4VTKWSURAYF in the last 24 hours.    No  results for input(s): MAGNESIUM in the last 24 hours.    No results for input(s): GLUCOSEMETER in the last 720 hours.        Hugo Colon DO ....................  2/8/2018   4:04 PM

## 2018-02-13 NOTE — PROGRESS NOTES
[facility]          OUTPATIENT PHYSICAL THERAPY ORTHOPEDIC EVALUATION  PLAN OF TREATMENT FOR OUTPATIENT REHABILITATION  (COMPLETE FOR INITIAL CLAIMS ONLY)  Patient's Last Name, First Name, M.I.  YOB: 1960  Denton Palacios    Provider s Name:  [facility]   Medical Record No.  [id number]   Start of Care Date:  02/13/18   Onset Date:  02/08/18   Type:     _X__PT   ___OT   ___SLP Medical Diagnosis:  S/P arthroscopy of right shoulder Z98.890     PT Diagnosis:  Decreased R shoulder ROM, strength, endurance   Visits from SOC:  1      _________________________________________________________________________________  Plan of Treatment/Functional Goals:  manual therapy, stretching, strengthening, ROM, neuromuscular re-education, motor coordination training, joint mobilization     Cryotherapy     Goals  Goal Identifier: Sleeping  Goal Description: Pt to sleep through the night without interuption due to pain for improved wellness  Target Date: 04/10/18    Goal Identifier: ADLs  Goal Description: Pt will use R shoulder for dressing, meal prep, showering for improved ADLs  Target Date: 04/10/18    Goal Identifier: ROM  Goal Description: Pt to be able to reach overhead for improved ADLs and shoulder mobility  Target Date: 04/10/18    Goal Identifier: ADLs  Goal Description: Pt to be able use his R shoulder/UE for activities at prior level of functions  Target Date: 05/08/18                                                Therapy Frequency:  2 times/Week  Predicted Duration of Therapy Intervention:  12 weeks    Darnell Acuna, PT                 I CERTIFY THE NEED FOR THESE SERVICES FURNISHED UNDER        THIS PLAN OF TREATMENT AND WHILE UNDER MY CARE     (Physician co-signature of this document indicates review and certification of the therapy plan).                       Certification Date From:  02/13/18   Certification Date To:  05/08/18    Referring Provider:   Cristobal    Initial Assessment        See Epic Evaluation Start of Care Date: 02/13/18

## 2018-02-13 NOTE — OR ANESTHESIA
Patient Information     Patient Name MRN Sex Denton Lepe [id number] Male 1960      OR Anesthesia by Hugo Colon DO at 2018  8:27 AM     Author:  Hugo Colon DO Service:  (none) Author Type:  PHYS- Anesthesiologist     Filed:  2018  8:28 AM Date of Service:  2018  8:27 AM Status:  Signed     :  Hugo Colon DO (PHYS- Anesthesiologist)            ANESTHESIAPREOP    PREANESTHETIC EXAM    Denton Palacios is a 57 y.o. male    There were no vitals taken for this visit.  There is no height or weight on file to calculate BMI.    ALLERGIES    Morphine sulfate    PAST MEDICAL HISTORY    Past Medical History:     Diagnosis  Date     Arthritis of right acromioclavicular joint 2018     Complete tear of right rotator cuff 2018     Impingement syndrome of right shoulder 2018     Olecranon bursitis of right elbow 2016     Pneumonia, organism unspecified(486) 2012     Right rotator cuff tendinitis 2018     S/P arthroscopic right shoulder surgery 2018       Patient Active Problem List     Diagnosis  Code     Quadriceps tendon rupture      Sleep apnea G47.30     Olecranon bursitis of right elbow M70.21     Right rotator cuff tendinitis M75.81     Impingement syndrome of right shoulder M75.41     Arthritis of right acromioclavicular joint M19.011     Complete tear of right rotator cuff M75.121     S/P arthroscopic right shoulder surgery Z98.890       Family History       Problem   Relation Age of Onset     Other  Father       of carotid aneurysm age 60       Heart Disease  Father 50     Heart Disease  Mother      hx of atrial fibrillation       Blood Disease  Mother      hx of leukemia; breast cancer       Psychiatric illness  Brother      hx of depression       Diabetes  Paternal Aunt        Past Surgical History:      Procedure  Laterality Date     ANTERIOR CRUCIATE LIGAMENT REPAIR Left     with graft       S/P ARTHROSCOPIC RIGHT SHOULDER SURGERY Right  02/08/2018       Major Anesthetic Reactions: none    PMH/PSH Reviewed    History    Smoking Status      Never Smoker   Smokeless Tobacco      Never Used     History      Alcohol Use   No     Comment:         Medications have been reviewed in coordination with proposed intra-procedure medications.    No prescriptions prior to admission.       Recent Labs  No results found for this visit on 02/08/18.    NPO Status Noted:  Yes    Airway Class:  2    ASA Physical Status: 2    Anesthetic Plan: GA/ LMA, Interscalene BPB for POPC    The risks, benefits, and alternatives of the procedure were discussed.    PHYSICIAN ELECTRONIC SIGNATURE  Luan Colon DO

## 2018-02-13 NOTE — OR POSTOP
Patient Information     Patient Name MRN Sex Denton Lepe [id number] Male 1960      OR PostOp by Penny Dukes RN at 2018  2:02 PM     Author:  Penny Dukes RN Service:  (none) Author Type:  NURS- Registered Nurse     Filed:  2018  2:09 PM Date of Service:  2018  2:02 PM Status:  Signed     :  Penny Dukes RN (NURS- Registered Nurse)            PACU Respiratory Event Documentation     1) Episodes of Apnea greater than or equal to 10 seconds: no    2) Bradypnea - less than 8 breaths per minute: no    3) Pain score on 0 to 10 scale: denies    4) Pain-sedation mismatch (yes or no): no    5) Repeated 02 desaturation less than 90% (yes or no): no    Anesthesia notified? (yes or no): no    Any of the above events occuring repeatedly in separate 30 minute intervals may be considered recurrent PACU respiratory events.     PACU Transfer Note    Denton Palacios transferred to U 735 via cart.  Equipment used for transport:  Oxygen, Nasal Cannula.  Accompanied by:  Registered Nurse    Patient stable and meets phase 1 discharge criteria for transport from PACU.    Handoff report given to Pebbles PLATT.    Penny Dukes RN

## 2018-02-13 NOTE — PROGRESS NOTES
Patient Information     Patient Name MRN Sex Denton Lepe [id number] Male 1960      Progress Notes by Pastora Jain R.T. (ARRT) at 2018 10:34 AM     Author:  [facility]. [facility] Service:  (none) Author Type:  [employer] - Registered Radiologic Technologist     Filed:  2018 10:35 AM Date of Service:  2018 10:34 AM Status:  Signed     :  Pastora Jain R.T. (ROSET) ([facility] - Registered Radiologic Technologist)            [facility] Protocol    A. Pre-procedure verification complete yes  1-relevant information / documentation available, reviewed and properly matched to the patient; 2-consent accurate and complete, 3-equipment and supplies available    B. Site marking complete Yes  Site marked if not in continuous attendance with patient    C. TIME OUT completed yes  Time Out was conducted just prior to starting procedure to verify the eight required elements: 1-patient identity, 2-consent accurate and complete, 3-position, 4-correct side/site marked (if applicable), 5-procedure, 6-relevant images / results properly labeled and displayed (if applicable), 7-antibiotics / irrigation fluids (if applicable), 8-safety precautions.

## 2018-02-13 NOTE — NURSING NOTE
Patient Information     Patient Name MRN Sex Denton Lepe [id number] Male 1960      Nursing Note by Gosselin, Norma J at 2018  9:15 AM     Author:  Gosselin, Norma J Service:  (none) Author Type:  (none)     Filed:  2018  9:21 AM Encounter Date:  2018 Status:  Signed     :  Gosselin, Norma J            Follow up MRI right shoulder DOI-17  Norma J Gosselin LPN....................  2018   9:21 AM

## 2018-02-13 NOTE — OR POSTOP
Patient Information     Patient Name MRN Sex Denton Lepe [id number] Male 1960      OR PostOp by Pebbles Ramsay RN at 2018  4:14 PM     Author:  Pebbles Ramsay RN Service:  (none) Author Type:  NURS- Registered Nurse     Filed:  2018  4:15 PM Date of Service:  2018  4:14 PM Status:  Signed     :  Pebbles Ramsay RN (NURS- Registered Nurse)            Discharge Note    Data:  Denton Palacios has been discharged home at 1610 via wheelchair accompanied by Registered Nurse and Family.      Action:  Written discharge/follow-up instructions were provided to patient and Spouse. Prescriptions were filled and sent with patient.  Belongings sent with patient. Medications from home sent with patient/family: Not Applicable  Equipment none .     Response:  Patient and Spouse verbalized understanding of discharge instructions, reason for discharge, and necessary follow-up appointments.

## 2018-02-13 NOTE — OR ANESTHESIA
Patient Information     Patient Name MRN Sex     Kings Palacios [id number] Male 1960      OR Anesthesia by Pancho Ornelas CRNA at 2018 10:19 AM     Author:  Pancho Ornelas CRNA Service:  (none) Author Type:  NURS- Nurse Anesthetist     Filed:  2018 10:21 AM Date of Service:  2018 10:19 AM Status:  Signed     :  Pancho Ornelas CRNA (NURS- Nurse Anesthetist)            ULTRASOUND GUIDED Right INTERSCALENE NERVE BLOCK FOR POST-OP PAIN MANAGEMENT  [facility]  Brachial Plexus Block    Patient: KINGS PALACIOS  Patient : 1960  Date: 2018  Procedure time:  945 to 955  Diagnosis: post op pain.    Surgeon requests interscalene block of the brachial plexus for post-op pain management.    The procedure, potential benefits, risks, and alternatives were discussed during the preoperative interview with the patient. He voiced understanding of the information and agreed to proceed with the procedure.    Universal protocol was followed.  TIME OUT conducted just prior to starting procedure confirmed patient identity, site/side, procedure, patient position and availability of correct equipment and implants.  Yes    Appropriate monitors present.  Oxygen administered per nasal cannula.     Pre-procedure sedation administered by day surgery RN.      Anesthesia: 0.5% ropivacaine subcutaneously    The ultrasound probe was used to locate the brachial plexus at the clavicle, adjacent to the artery.It was followed cephalad to a point where it was bracketed by the scalene muscles. The Right neck was prepped.  A 22 gauge stimuplex insulated needle was advanced to a point where the tip rested adjacent to, but not within the brachial plexus. A 1ml test dose was negative following negative aspiration.  A 5 ml bolus of injectate was injected easily and painlessly to demonstrate hydrodissection around the brachial plexus. A total of 30ml of 0.5% Ropivacaine with 2mg  preservative free Dexamethasone was injected incrementally, with negative aspiration every 5 ml.  No parasthesias were elicited either during needle placement or medication injection.  There were no other immediate complications apparent.     A permanent copy of image scanned into chart.    Pancho Ornelas CRNA

## 2018-02-13 NOTE — PROGRESS NOTES
02/13/18 0939   General Information   Type of Visit Initial OP Ortho PT Evaluation   Start of Care Date 02/13/18   Referring Physician Cristobal   Patient/Family Goals Statement Return to activity at prior level of function   Orders Evaluate and Treat   Orders Comment 2a 3/8, 2b 3/22, 3 4/5   Date of Order 02/13/18   Insurance Type Medicare   Medical Diagnosis S/P arthroscopy of right shoulder Z98.890   Surgical/Medical history reviewed Yes   Precautions/Limitations no known precautions/limitations   Weight-Bearing Status - RUE nonweight-bearing   Special Instructions 2a 3/8, 2b 3/22, 3 4/5, see orders for details   General Information Comments Dr Jain shoulder protocol       Present No   Body Part(s)   Body Part(s) Shoulder   Presentation and Etiology   Pertinent history of current problem (include personal factors and/or comorbidities that impact the POC) DOS: 2/8/18   Impairments A. Pain;F. Decreased strength and endurance;D. Decreased ROM   Functional Limitations perform activities of daily living   Symptom Location R shoulder   How/Where did it occur At home;With a fall   Onset date of current episode/exacerbation 02/08/18   Chronicity New   Pain rating (0-10 point scale) Worst (/10)   Worst (/10) 4   Frequency of pain/symptoms B. Intermittent   Pain/symptoms are: Worse during the night   Pain/symptoms eased by C. Rest   Prior Level of Function   Prior Level of Function-Mobility Ind   Prior Level of Function-ADLs Ind   Functional Level Prior Comment Ind   Current Level of Function   Current Community Support Family/friend caregiver   Patient role/employment history F. Retired   Living environment [place]   Fall Risk Screen   Fall screen completed by PT   Have you fallen 2 or more times in the past year? No   Have you fallen and had an injury in the past year? Yes   Is patient a fall risk? No   Fall screen comments pt slipped on ice which caused injury   Planned Therapy  Interventions   Planned Therapy Interventions manual therapy;stretching;strengthening;ROM;neuromuscular re-education;motor coordination training;joint mobilization   Planned Modality Interventions   Planned Modality Interventions Cryotherapy   Clinical Impression   Criteria for Skilled Therapeutic Interventions Met yes, treatment indicated   PT Diagnosis Decreased R shoulder ROM, strength, endurance   Functional limitations due to impairments R shoulder ROM   Clinical Presentation Stable/Uncomplicated   Clinical Decision Making (Complexity) Low complexity   Therapy Frequency 2 times/Week   Predicted Duration of Therapy Intervention (days/wks) 12 weeks   Risk & Benefits of therapy have been explained Yes   Patient, Family & other staff in agreement with plan of care Yes   Clinical Impression Comments post op R shoulder surgery   Education Assessment   Preferred Learning Style Demonstration;Pictures/video   Barriers to Learning No barriers   ORTHO GOALS   PT Ortho Eval Goals 1;2;3;4   Ortho Goal 1   Goal Identifier Sleeping   Goal Description Pt to sleep through the night without interuption due to pain for improved wellness   Target Date 04/10/18   Ortho Goal 2   Goal Identifier ADLs   Goal Description Pt will use R shoulder for dressing, meal prep, showering for improved ADLs   Target Date 04/10/18   Ortho Goal 3   Goal Identifier ROM   Goal Description Pt to be able to reach overhead for improved ADLs and shoulder mobility   Target Date 04/10/18   Ortho Goal 4   Goal Identifier ADLs   Goal Description Pt to be able use his R shoulder/UE for activities at prior level of functions   Target Date 05/08/18   Total Evaluation Time   Total Evaluation Time 20   Therapy Certification   Certification date from 02/13/18   Certification date to 05/08/18   Medical Diagnosis S/P arthroscopy of right shoulder Z98.890

## 2018-02-13 NOTE — PROGRESS NOTES
Patient Information     Patient Name MRN Sex Denton Lepe [id number] Male 1960      Progress Notes by Past[facility]T. [facility] at 2018 10:35 AM     Author:  Pastora [facility]T. [facility] Service:  (none) Author Type:  [facility] - Registered Radiologic Technologist     Filed:  2018 10:35 AM Date of Service:  2018 10:35 AM Status:  Signed     :  Pastora Jain R.T. ([facility]) ([facility] - Registered Radiologic Technologist)            Falls Risk Criteria:    Age 65 and older or under age 4        Sensory deficits    Poor vision    Use of ambulatory aides    Impaired judgment    Unable to walk independently    Meets High Risk criteria for falls:  no

## 2018-02-13 NOTE — PROGRESS NOTES
Patient Information     Patient Name MRN Sex Denton Lepe [id number] Male 1960      Progress Notes by Edward Jain DO at 2018  9:15 AM     Author:  Edward Jain DO Service:  (none) Author Type:  Physician     Filed:  2018  9:50 AM Encounter Date:  2018 Status:  Signed     :  Edward Jain DO (Physician)            PROGRESS NOTE    SUBJECTIVE:  Denton Palacios is here for evaluation in regards to right shoulder. He is still having the pain especially at night. It's worse with overhead activities. He has struggled with it for multiple months and really is having more challenges with it. He feels some weakness in it when he does overhead activities and with pushing and pulling. Pain is into the anterior bicipital region had a shoulder as well as posteriorly. He has been working on his wall walking.    OBJECTIVE:  /74 (Cuff Site: Right Arm, Position: Sitting, Cuff Size: Adult Large)  Pulse 60 There is no height or weight on file to calculate BMI.    General Appearance: Pleasant male in good appearance, mood and affect.  Alert and orientated times three ( time, date and location).    Skin: Intact.    Shoulder:  Motion: Patient does have full active forward flexion abduction is about 5  short passively it is full. Internal rotation to his back pocket with discomfort, external rotation to 45 .  Hawkin's Sign: positive.  Neer's Sign: positive.  Cross body adduction:  positive.  Drop arm test: negative.  Weakness at waist level: positive, slight difference when compared to the opposite side.  Bicipital testing: negative.  Lift off test:  negative.  Hinojosa's test:  negative.    Elbow:  Flexion: Normal.  Extension: Normal.    Hand:  Thenar wasting: no.  Hypothenar wasting: no.  Sensation: Normal.  Radial and ulnar blood flow:  Normal.    Eyes: Pupils are round..    Ears: Hearing: Intact.    Heart: He has normal capillary refill into his hands pulses are  regular.    Lungs: Distant breath sounds.     Radiographic images from 1/4, 1/16 where independently reviewed and discussed with the patient.     X-rays do demonstrate a type II acromial hook, there is calcific tendinitis into the shoulder noted on the outlet view and he has narrowing of the acromioclavicular joint with significant osteophyte formation. Humeral head is in appropriate position.    XR SHOULDER 3 VIEWS RIGHT  HISTORY: 57 yearsMale Right shoulder pain, unspecified chronicity  TECHNIQUE: 4 views right shoulder  COMPARISON: None  FINDINGS: There is acromioclavicular osteoarthritis with superior osteophytic spurring. The glenohumeral joint is congruent. There is no evidence of fracture or dislocation. There is mild degenerative bony osteophytic change of the inferior aspect of the glenohumeral articulation.  IMPRESSION: Acromioclavicular osteoarthritis of moderate severity. There is glenohumeral osteoarthritic change of mild severity.  Electronically Signed By: Will Reese M.D. on 1/4/2018 10:19 AM    MRA:    MR ARTHROGRAM SHOULDER RIGHT  HISTORY: 57 years Male 2 months of right shoulder pain associated with fall at home.  COMPARISON: Plain films dated 01/04/2018  TECHNIQUE: Coronal T1 fat-sat, coronal T2 fat-sat, sagittal T1 fat-sat, sagittal proton density, axial T1 fat-sat, axial gradient echo imaging of the shoulder was performed. Dilute arthrographic contrast was administered into the glenohumeral joint prior to MRI imaging.  FINDINGS: There is communication of contrast between the subdeltoid subacromial bursa and glenohumeral articulation.  Rotator cuff:  Supraspinatus : There is a full-thickness tear involving nearly the entire and posterior width of the tendon approximately 2 cm from its humeral insertion, directly above the superior convexity of the humeral head.  Infraspinatus: There is increased signal along the undersurface of the tendon.  Teres minor: The tendon is  intact  Subscapularis: There is thickening and increased signal within the tendon  Biceps tendon: The intra-and extra-articular portions of the biceps tendon are intact. The biceps labral anchor is intact.  Labrum: The labrum is intact  Acromioclavicular joint: There is osteoarthritic change. There is prominent superior osteophytic spurring with bony proliferation and subcortical cystic change. There is minimal inferior osteophytic spurring.  IMPRESSION: Full-thickness rotator cuff tear involving the supraspinatus tendon over the superior convexity of the humeral head.  There is tendinosis of the infraspinatus and subscapularis tendons.  Electronically Signed By: Will Reese M.D. on 1/16/2018 11:49 AM    ASSESSMENT:    Right small amount of calcific tendinitis.  Right impingement syndrome.  Right acromioclavicular arthritis.  Right rotator cuff partial tear.  Subluxed long head of the biceps right shoulder.    PLAN:    I discussed conservative and surgical options with the patient at this time will work with consideration of surgery.  We did talk at length about surgery and the postop treatment plan he verbalized an understanding.  He was instructed on wall walking and was able to perform.  I reviewed the poster board, model and the handout with the patient to help them understand impingement type symptoms and rotator cuff tear he verbalized an understanding.  He will follow up after above.  Questions and concerns were answered.    I have discussed options with Denton Palacios the treatment for shoulder impingement syndrome, which have included observation, physical therapy, corticosteroid injection versus shoulder surgery. I discussed pros and cons of each approach, and at this point, Denton Palacios would like to proceed with shoulder surgery. We discussed surgery would be an outpatient procedure, you would be able to go home following the surgery. We will plan on arthroscopic versus possible open procedure  "with anything else that needs to be done.  Surgical anesthesia would be general anesthesia with possible interscalene block to control pain following surgery.   I discussed following surgery Denton Palacios would be in a sling for eight weeks following surgery with gentle motion of the elbow, wrist maneuvers after surgery.  At four weeks following surgery  further motion and strengthening with the shoulder with physical therapy will commence and it is a step wise approach.   Full recovery from shoulder surgery may take a year. The goal will be pain relief. Complications were discussed including continued pain, stiffness in the shoulder, rare chance of neurovascular damage, potential chance of infection. If deep   Infection were to occur, further surgery may be needed with repeat washout in the operating room with possible need for treatment with antibiotics.    If tolerated use of of an EC-ASA 325mg is recommended for 30 days after surgery.    Risks, benefits, conservative, surgical, and alternatives of treatment were thoroughly outlined. No guarantees were given. Risks which do include, but are not limited to:  Scar, infection, decreased motion, damage to blood vessels, nerves and tendons, failure or need for further treatment, reaction to medications and anesthesia, blood clots, and the possibility of death where discussed.  He did verbalize an understanding of the above and that if a biceps tenotomy is performed they would have a \"aris sign\" since the long head of the biceps would sit lower.  All questions and concerns were addressed.    Patient is set up for right shoulder arthroscopy with SAD,DCE, rotator cuff repair and biceps tenotomy and a block if it is felt appropriate after discussing with anesthesia.    Denton Palacios will need pre op clearance for management of pulmonary and cardiac evaluation and function for planned surgical intervention.    Follow up after surgery will be 3-7 days    All questions " where answered to the patients satisfaction.    Edward Jain D.O.  Orthopaedic Surgeon    [facility]  [address] [place] [address]  Phone [phone] (KNEE)  Fax [phone]    This document was created using computer generated templates and voice activated software.    9:46 AM 1/22/2018

## 2018-02-13 NOTE — PROCEDURES
Patient Information     Patient Name MRN Sex Denton Lepe [id number] Male 1960      Procedures by Edward Jain DO at 2018  1:21 PM     Author:  Edward Jain DO Service:  (none) Author Type:  Physician     Filed:  2018  1:21 PM Date of Service:  2018  1:21 PM Status:  Signed     :  Edward Jain DO (Physician)            POSTOPERATIVE / POSTPROCEDURE NOTE - IMMEDIATE :    Surgeon(s)/Proceduralist(s) and Assistants (if any):  Surgeon(s):  Edward Jain DO    Procedure(s):  Right shoulder arthroscopy with ED/SAD/DCE/RTCR/BICEPS TENOTOMY    Procedure(s) findings:   See op note    Specimen(s) removed: no    (EBL) Estimated blood loss (ml): 5    Postoperative/Postprocedure Diagnosis:   See op note    Edward Jain D.O.  Orthopaedic Surgeon    [address] [address]  Phone [phone] (KNEE)  Fax [phone]    1:21 PM 2018

## 2018-02-13 NOTE — PROGRESS NOTES
Patient Information     Patient Name MRN Sex Denton Lepe [id number] Male 1960      Progress Notes by Beto Robertson MD at 2018  8:15 AM     Author:  Beto Robertosn MD Service:  (none) Author Type:  Physician     Filed:  2018 12:22 PM Encounter Date:  2018 Status:  Signed     :  Beto Robertson MD (Physician)            PREOPERATIVE Anesthesia Medical Evaluation  Date of Exam: 2018    Nursing Notes:   Criselda Christy  2018  8:27 AM  Signed  Patient here for preop will be seeing  on 18 for right shoulder surgery.  No concerns today, will need an updated EKG. Flu vaccine updated at [facility] on 2017. Criselda Christy LPN .......................2018  8:23 AM       HPI:  Patient is here at the request of Dr. Jain prior to undergoing shoulder  surgery on date noted above.  Patient indicates last year he had fallen. The fall occurred in November. He was found to have a bicep tendon rupture. And bone spurs. He is scheduled for surgery on . Patient has trouble lifting his arm. Pain with movement. And trouble sleeping.  Proposed anesthesia: gen  Anesthesia Complications: no  History of abnormal bleeding : no  History of Blood Transfusions: no      Cards risk factors:     History    Smoking Status      Never Smoker   Smokeless Tobacco      Never Used   ,   LDL CHOLESTEROL (mg/dL)    Date Value   2012 101   ,   BP Readings from Last 1 Encounters:    18 126/78     ,   GLUCOSE (mg/dL)    Date Value   2013 93   , No results found for: HGBA1C,  yes early heart history in family mom in her 30s    CPR: yes  Allergies: Morphine sulfate   Latex Allergy: no  Immunization History     Administered  Date(s) Administered     Hepatitis B (Adult) 2002, 2002, 2002     Influenza, IIV3 (Age 6-35 mos) 2013     Influenza, IIV3 (Age >=3 years) 2007, 10/15/2013     Influenza, IIV4 10/14/2014     Td (Age >=7 Years)  2005           Problem List:   Patient Active Problem List     Diagnosis  Code     Quadriceps tendon rupture      Sleep apnea G47.30     Olecranon bursitis of right elbow M70.21     Right rotator cuff tendinitis M75.81     Impingement syndrome of right shoulder M75.41     Arthritis of right acromioclavicular joint M19.011     Complete tear of right rotator cuff M75.121      Histories:  Past Medical History:     Diagnosis  Date     Arthritis of right acromioclavicular joint 2018     Complete tear of right rotator cuff 2018     Impingement syndrome of right shoulder 2018     Olecranon bursitis of right elbow 2016     Pneumonia, organism unspecified(486) 2012     Right rotator cuff tendinitis 2018      Past Surgical History:      Procedure  Laterality Date     LEG/ANKLE SURGERY  13    quadraceps tear times 2       Social History     Social History        Marital status:       Spouse name: N/A     Number of children:  N/A     Years of education:  N/A     Occupational History       [employer]     Social History Main Topics         Smoking status:   Never Smoker     Smokeless tobacco:   Never Used     Alcohol use   No      Comment:        Drug use:   No     Sexual activity:   Not on file     Other Topics   Concern      Service  No     Blood Transfusions  No     Caffeine Concern  No     Occupational Exposure  No     Hobby Hazards  No     Sleep Concern  Yes     apnea does not use machine due to trouble with mask      Stress Concern  No     Weight Concern  No     Special Diet  No     Back Care  No     Exercise  No     Bike Helmet  No     Seat Belt  Yes     Social History Narrative     , no children.  Works as a  for [employer] in [employer];         Pre loaded 13                        Family History       Problem   Relation Age of Onset     Other  Father       of carotid aneurysm age 60       Heart Disease  Father  "50     Heart Disease  Mother      hx of atrial fibrillation       Blood Disease  Mother      hx of leukemia; breast cancer       Psychiatric illness  Brother      hx of depression       Diabetes  Paternal Aunt         Current Medications:    Medications have been reviewed by me and are current to the best of my knowledge and ability.    Recent use of: no recent use of aspirin (ASA), NSAIDS or steroids    HABITS:     Health Care Directive or Living Will: yes    REVIEW OF SYSTEMS:  Fever/Chills or other infectious symptoms in past month:  (NO)   >10lb weight loss in past two months:  (NO)   General: Denies general constitutional problems  Eyes: Denies problems  Ears/Nose/Throat: Denies problems  Cardiovascular: Denies problems  Respiratory: Denies problems  Gastrointestinal: Denies problems  Musculoskeletal: see HPI  Skin: Denies problems  Neurologic: Denies problems  Psychiatric: Denies problems  Endocrine: Denies problems     EXAM:   /78 (Cuff Site: Right Arm, Position: Sitting, Cuff Size: Adult Large)  Pulse (!) 48  Ht 1.88 m (6' 2\")  Wt (!) 140.4 kg (309 lb 9.6 oz)  SpO2 97%  BMI 39.75 kg/m2 Body mass index is 39.75 kg/(m^2).  General Appearance: Pleasant, alert, appropriate appearance for age. No acute distress  Head Exam: Normocephalic, without obvious abnormality.  Eye Exam: Normal external eye, conjunctiva, lids, cornea. SHAHZAD.  Ear Exam: Normal.  Nose Exam: Normal external nose, mucus membranes, and septum.  OroPharynx Exam: Normal.  Chest/Respiratory Exam: Normal chest wall and respirations. Clear to auscultation.  Cardiovascular Exam: Regular rate and rhythm. S1, S2, no murmur, click, gallop, or rubs.  Gastrointestinal Exam: Soft, nontender, no abnormal masses or organomegaly.  Musculoskeletal Exam: Pain with passive range of motion. Decrease internal sternal rotation.  Skin: Normal.  Psychiatric Exam: Alert and oriented, appropriate affect.    DIAGNOSTICS:   1. EKG:  sinus bradycardia similar to " previous EKGs. From 2013  3. Labs:   Results for orders placed or performed in visit on 01/25/18       COMP METABOLIC PANEL       Result  Value Ref Range Status    SODIUM 144 (H) 133 - 143 mmol/L Final    POTASSIUM 4.2 3.5 - 5.1 mmol/L Final    CHLORIDE 108 (H) 98 - 107 mmol/L Final    CO2,TOTAL 26 21 - 31 mmol/L Final    ANION GAP 10 5 - 18                 Final    GLUCOSE 111 (H) 70 - 105 mg/dL Final    CALCIUM 9.4 8.6 - 10.3 mg/dL Final    BUN 19 7 - 25 mg/dL Final    CREATININE 1.12 0.70 - 1.30 mg/dL Final    BUN/CREAT RATIO           17                 Final    GFR if African American >60 >60 ml/min/1.73m2 Final    GFR if not African American >60 >60 ml/min/1.73m2 Final    ALBUMIN 4.6 3.5 - 5.7 g/dL Final    PROTEIN,TOTAL 7.2 6.4 - 8.9 g/dL Final    GLOBULIN                  2.6 2.0 - 3.7 g/dL Final    A/G RATIO 1.8 1.0 - 2.0                 Final    BILIRUBIN,TOTAL 0.7 0.3 - 1.0 mg/dL Final    ALK PHOSPHATASE 70 34 - 104 IU/L Final    ALT (SGPT) 28 7 - 52 IU/L Final    AST (SGOT) 18 13 - 39 IU/L Final   CBC WITH AUTO DIFFERENTIAL       Result  Value Ref Range Status    WHITE BLOOD COUNT         8.1 4.5 - 11.0 thou/cu mm Final    RED BLOOD COUNT           5.47 4.30 - 5.90 mil/cu mm Final    HEMOGLOBIN                16.2 13.5 - 17.5 g/dL Final    HEMATOCRIT                47.7 37.0 - 53.0 % Final    MCV                       87 80 - 100 fL Final    MCH                       29.6 26.0 - 34.0 pg Final    MCHC                      34.0 32.0 - 36.0 g/dL Final    RDW                       13.5 11.5 - 15.5 % Final    PLATELET COUNT            233 140 - 440 thou/cu mm Final    MPV                       10.5 6.5 - 11.0 fL Final    NEUTROPHILS               60.1 42.0 - 72.0 % Final    LYMPHOCYTES               29.1 20.0 - 44.0 % Final    MONOCYTES                 7.3 <12.0 % Final    EOSINOPHILS               2.7 <8.0 % Final    BASOPHILS                 0.6 <3.0 % Final    IMMATURE GRANULOCYTES(METAS,MYELOS,PROS) 0.2 %  Final    ABSOLUTE NEUTROPHILS      4.8 1.7 - 7.0 thou/cu mm Final    ABSOLUTE LYMPHOCYTES      2.3 0.9 - 2.9 thou/cu mm Final    ABSOLUTE MONOCYTES        0.6 <0.9 thou/cu mm Final    ABSOLUTE EOSINOPHILS      0.2 <0.5 thou/cu mm Final    ABSOLUTE BASOPHILS        0.1 <0.3 thou/cu mm Final    ABSOLUTE IMMATURE GRANULOCYTES(METAS,MYELOS,PROS) 0.0 <=0.3 thou/cu mm Final       4. Pre-op urine for pregnancy (for 12 yrs and older or menstruating): not applicable    IMPRESSION:   Satisfactory Preoperative Anesthesia Medical Evaluation;    For above listed surgery and anesthesia:   Patient is low risk for perioperative complications.    Patient is on chronic pain medications (NO);   Patient is on antiplatlet/anticoagulation (NO)  Other medications that need adjustment perioperatively (NO)        Patient may proceed with surgery with appropriate anesthesia.  Labs/ Ekg to be faxed to surgeon's office.   Patient informed NPO after midnight night prior to surgery, to avoid NSAIDS, ASA, fish oil, and flax seed  over the counter ten days prior to surgery.   If  febrile illness or productive cough, please contact the surgeon's office.                1. Preop examination      2. Right rotator cuff tendinitis      3. Complete tear of right rotator cuff      4. Arthritis of right acromioclavicular joint      5. Impingement syndrome of right shoulder      6. Sleep apnea, unspecified type          Electronically Signed by: Beto Robertson MD ....................  1/26/2018   12:22 PM   1/25/2018

## 2018-02-13 NOTE — TELEPHONE ENCOUNTER
Patient Information     Patient Name MRN Denton Matthews [id number] Male 1960      Telephone Encounter by Andreia Jama at 2018 12:59 PM     Author:  Andreia Jama Service:  (none) Author Type:  (none)     Filed:  2018  1:02 PM Encounter Date:  2018 Status:  Signed     :  Andreia Jama            TDE-  Pt's wife would like to know if 18 would be an option for surgery, if necessary. Trying to get her work schedule figured out.      Andreia Jama ....................  2018   1:01 PM

## 2018-02-13 NOTE — INTERVAL H&P NOTE
Patient Information     Patient Name MRN Sex Denton Lepe [id number] Male 1960      Interval H&P Note by Edward Jain DO at 2018  8:59 AM     Author:  Edward Jain DO Service:  (none) Author Type:  Physician     Filed:  2018  8:59 AM Date of Service:  2018  8:59 AM Status:  Signed     :  Edward Jain DO (Physician)            History and Physical Update    The history and physical has been reviewed and the patient's right shoulder has been examined.  There are no interim changes to the patient's history or physical condition.  He is ready to proceed with planned procedure.  He understands the risks and benefits and once again these where outlined.    Edward Jain DO  Orthopedic Surgeon        Source Note     Author:  Scanner Service:  (none) Author Type:  (none)    Filed:  2018 12:21 PM Date of Service:  2018 12:21 PM Status:  Signed    :  Scanner           Scan on 2018 12:21 PM by Christin Morales

## 2018-02-13 NOTE — NURSING NOTE
Patient Information     Patient Name MRN Denton Matthews [id number] Male 1960      Nursing Note by Criselda Christy at 2018  8:15 AM     Author:  Criselda Christy Service:  (none) Author Type:  (none)     Filed:  2018  8:27 AM Encounter Date:  2018 Status:  Signed     :  Criselda Christy            Patient here for preop will be seeing  on 18 for right shoulder surgery.  No concerns today, will need an updated EKG. Flu vaccine updated at [facility] on 2017. Criselda Christy LPN .......................2018  8:23 AM

## 2018-02-13 NOTE — OR NURSING
Patient Information     Patient Name MRN Denton Matthews [id number] Male 1960      OR Nursing by Malena Alberto RN at 2018 11:20 AM     Author:  Malena Alberto RN Service:  (none) Author Type:  NURS- Registered Nurse     Filed:  2018 11:20 AM Date of Service:  2018 11:20 AM Status:  Signed     :  Malena Alberto RN (NURS- Registered Nurse)            Received preop report from Noemy Barajas RN.

## 2018-02-13 NOTE — TELEPHONE ENCOUNTER
Patient Information     Patient Name MRN Denton Matthews [id number] Male 1960      Telephone Encounter by Andreia Jama at 2018  8:47 AM     Author:  Andreia Jama Service:  (none) Author Type:  (none)     Filed:  2018  8:55 AM Encounter Date:  2018 Status:  Signed     :  Andreia Jama            TDE- Pt's wife would like to provide some infor prior to his appt at 9:15 today.  Stated something about a traumatic experience then message cut off.  Please call.    Andreia Jama ....................  2018   8:55 AM

## 2018-02-13 NOTE — INITIAL ASSESSMENTS
Patient Information     Patient Name MRN Denton Matthews [id number] Male 1960      Initial Assessments by Stacie Ledesma OT at 2018 11:07 AM     Author:  Stacie Ledesma OT Service:  (none) Author Type:  OT- Occupational Therapist     Filed:  2018  3:38 PM Date of Service:  2018 11:07 AM Status:  Signed     :  Stacie Ledesma OT (OT- Occupational Therapist)            Patient is a 58 y/o male who underwent right shoulder arthroscopy this date with Dr Jain. Patient was seen both pre and post operatively to be fitted with the Sling Shot 3 shoulder orthosis and adjusted properly. Patient and spouse were educated on the proper and safe donning and doffing of orthosis, as well as safe dressing and bathing techniques, and AROM exercises for right elbow/wrist/hand. Patient and spouse verbalized understanding and were able to return demonstration of all techniques learned. Patient will follow up with Dr Jain within 1 week and no further OT is needed or recommended at this time.     Stacie Ledesma OT ....................  2018   3:38 PM        G codes and Modifier based on patient's presentation and clinical judgement:     Current Primary G Code and Modifier:    Per the Patient's intake and/or assessment the Primary G Code is: Self Care .   The Patient's Impairment, Limitation or Restriction Modifier would be best described as: CI - 1% - 20% Impairment.     Goal Primary G Code and Modifier:    The Patient's G Code Goal would be: Self Care    The Patient's Impairment, Limitation or Restriction Modifier goal would be best described as: CI - 1% - 20% Impairment.   Discharge Primary G Code and Modifier:      The Patient's status upon Discharge is Self Care    The Patient's Impairment, Limitation or Restriction Modifier would be best described as CI - 1% - 20% Impairment.

## 2018-02-13 NOTE — TELEPHONE ENCOUNTER
Patient Information     Patient Name MRN Denton Matthews [id number] Male 1960      Telephone Encounter by Martha Song at 2018  9:09 AM     Author:  Martha Song Service:  (none) Author Type:  (none)     Filed:  2018  9:14 AM Encounter Date:  2018 Status:  Signed     :  Martha Song            Patient's wife called wanting to give us an FYI about patient being anxious about surgery because of his prior surgery experience.  He had a quad tendon repair done by Dr. Cueto here in the past and has had a few surgeries on that since to fix it.  He still has problems from this.  So he will be anxious about having surgery if he needs it.  Martha Song LPN .......2018 9:13 AM

## 2018-03-08 ENCOUNTER — HOSPITAL ENCOUNTER (OUTPATIENT)
Dept: PHYSICAL THERAPY | Facility: OTHER | Age: 58
Setting detail: THERAPIES SERIES
End: 2018-03-08
Attending: ORTHOPAEDIC SURGERY
Payer: MEDICARE

## 2018-03-08 PROCEDURE — 97110 THERAPEUTIC EXERCISES: CPT | Mod: GP | Performed by: PHYSICAL THERAPIST

## 2018-03-08 PROCEDURE — 97016 VASOPNEUMATIC DEVICE THERAPY: CPT | Performed by: PHYSICAL THERAPIST

## 2018-03-08 PROCEDURE — 40000185 ZZHC STATISTIC PT OUTPT VISIT: Performed by: PHYSICAL THERAPIST

## 2018-03-13 ENCOUNTER — HOSPITAL ENCOUNTER (OUTPATIENT)
Dept: PHYSICAL THERAPY | Facility: OTHER | Age: 58
Setting detail: THERAPIES SERIES
End: 2018-03-13
Attending: ORTHOPAEDIC SURGERY
Payer: MEDICARE

## 2018-03-13 PROCEDURE — 40000185 ZZHC STATISTIC PT OUTPT VISIT

## 2018-03-13 PROCEDURE — 97016 VASOPNEUMATIC DEVICE THERAPY: CPT

## 2018-03-13 PROCEDURE — 97110 THERAPEUTIC EXERCISES: CPT | Mod: GP

## 2018-03-15 ENCOUNTER — HOSPITAL ENCOUNTER (OUTPATIENT)
Dept: PHYSICAL THERAPY | Facility: OTHER | Age: 58
Setting detail: THERAPIES SERIES
End: 2018-03-15
Attending: ORTHOPAEDIC SURGERY
Payer: MEDICARE

## 2018-03-15 PROCEDURE — 97016 VASOPNEUMATIC DEVICE THERAPY: CPT

## 2018-03-15 PROCEDURE — 97110 THERAPEUTIC EXERCISES: CPT | Mod: GP

## 2018-03-15 PROCEDURE — 40000185 ZZHC STATISTIC PT OUTPT VISIT

## 2018-03-21 ENCOUNTER — HOSPITAL ENCOUNTER (OUTPATIENT)
Dept: PHYSICAL THERAPY | Facility: OTHER | Age: 58
Setting detail: THERAPIES SERIES
End: 2018-03-21
Attending: ORTHOPAEDIC SURGERY
Payer: MEDICARE

## 2018-03-21 PROCEDURE — 97016 VASOPNEUMATIC DEVICE THERAPY: CPT | Performed by: PHYSICAL THERAPIST

## 2018-03-21 PROCEDURE — 97110 THERAPEUTIC EXERCISES: CPT | Mod: GP | Performed by: PHYSICAL THERAPIST

## 2018-03-21 PROCEDURE — 40000185 ZZHC STATISTIC PT OUTPT VISIT: Performed by: PHYSICAL THERAPIST

## 2018-03-23 ENCOUNTER — HOSPITAL ENCOUNTER (OUTPATIENT)
Dept: PHYSICAL THERAPY | Facility: OTHER | Age: 58
Setting detail: THERAPIES SERIES
End: 2018-03-23
Attending: ORTHOPAEDIC SURGERY
Payer: MEDICARE

## 2018-03-23 PROCEDURE — 40000185 ZZHC STATISTIC PT OUTPT VISIT: Performed by: PHYSICAL THERAPIST

## 2018-03-23 PROCEDURE — 97110 THERAPEUTIC EXERCISES: CPT | Mod: GP | Performed by: PHYSICAL THERAPIST

## 2018-03-28 ENCOUNTER — HOSPITAL ENCOUNTER (OUTPATIENT)
Dept: PHYSICAL THERAPY | Facility: OTHER | Age: 58
Setting detail: THERAPIES SERIES
End: 2018-03-28
Attending: ORTHOPAEDIC SURGERY
Payer: MEDICARE

## 2018-03-28 PROCEDURE — 97110 THERAPEUTIC EXERCISES: CPT | Mod: GP | Performed by: PHYSICAL THERAPIST

## 2018-03-28 PROCEDURE — 40000185 ZZHC STATISTIC PT OUTPT VISIT: Performed by: PHYSICAL THERAPIST

## 2018-03-30 ENCOUNTER — OFFICE VISIT (OUTPATIENT)
Dept: ORTHOPEDICS | Facility: OTHER | Age: 58
End: 2018-03-30
Attending: ORTHOPAEDIC SURGERY
Payer: MEDICARE

## 2018-03-30 ENCOUNTER — HOSPITAL ENCOUNTER (OUTPATIENT)
Dept: PHYSICAL THERAPY | Facility: OTHER | Age: 58
Setting detail: THERAPIES SERIES
End: 2018-03-30
Attending: ORTHOPAEDIC SURGERY
Payer: MEDICARE

## 2018-03-30 VITALS
WEIGHT: 300 LBS | HEART RATE: 56 BPM | SYSTOLIC BLOOD PRESSURE: 120 MMHG | HEIGHT: 74 IN | DIASTOLIC BLOOD PRESSURE: 80 MMHG | BODY MASS INDEX: 38.5 KG/M2

## 2018-03-30 DIAGNOSIS — Z98.890 S/P ARTHROSCOPY OF SHOULDER: Primary | ICD-10-CM

## 2018-03-30 PROCEDURE — 40000185 ZZHC STATISTIC PT OUTPT VISIT

## 2018-03-30 PROCEDURE — G0463 HOSPITAL OUTPT CLINIC VISIT: HCPCS

## 2018-03-30 PROCEDURE — 97110 THERAPEUTIC EXERCISES: CPT | Mod: GP

## 2018-03-30 PROCEDURE — 99024 POSTOP FOLLOW-UP VISIT: CPT | Performed by: ORTHOPAEDIC SURGERY

## 2018-03-30 ASSESSMENT — PAIN SCALES - GENERAL: PAINLEVEL: MILD PAIN (2)

## 2018-03-30 NOTE — PROGRESS NOTES
"PROGRESS NOTE    SUBJECTIVE:  Denton Palacios is here for evaluation in regards to right shoulder.  He is now 7 weeks 1 day out from shoulder surgery.  He is making some pretty good gains with how things are going for and has expected discomfort.  He has been very careful and is protecting his shoulder.    OBJECTIVE:  /80 (BP Location: Right arm, Patient Position: Sitting, Cuff Size: Adult Large)  Pulse 56  Ht 1.88 m (6' 2\")  Wt 136.1 kg (300 lb)  BMI 38.52 kg/m2 Body mass index is 38.52 kg/(m^2).    General Appearance: Pleasant 57 year old male in good appearance, mood and affect.  Alert and orientated times three ( time, date and location).    Skin: Well-healed surgical incision sites.    Shoulder:  Motion: Passive forward flexion standing here today was approximately 105 , when I had him while walking he made it to 125  there is a soft feeling to his capsule.  Strength: Expected weakness.    Elbow:  Motion: Full motion.    Hand:  Thenar wasting: no.  Hypothenar wasting: no.  Sensation: Intact.  Radial and ulnar blood flow:  normal.    Eyes: Pupils are round.    Ears: Hearing: Intact.    Heart: good capillary refill into his hands pulses are regular.    Lungs: Distant breath sounds.    Physical therapy: Patient is making good gains with his therapy he is on protocol and moving forward.    Notes were reviewed with the patient.  Please refer to the reports for full details.    IMPRESSION:    POSTOPERATIVE DIAGNOSES:  1.  Tear of the supraspinatus and infraspinatus, right shoulder.  2.  Impingement syndrome.  3.  Acromioclavicular arthritis.  4.  Bicipital partial tearing.  5.  Degenerative labral tearing.  6.  Excellent cartilage, humeral head and glenoid.     PROCEDURES:  1.  Right shoulder arthroscopy with extensive debridement to include 2% anterior to posterior labrum done from the glenohumeral joint (DOS 02/08/2018).  2.  Extensive debridement, 4% of the supraspinatus and infraspinatus done from the " subacromial space.  3.  Arthroscopic biceps tenotomy.  4.  Arthroscopic subacromial decompression.  5.  Arthroscopic distal clavicle excision, approximately 15 mm width of resection completed.  6.  Arthroscopic rotator cuff repair utilizing 1 medial row 5.5 mm BioComposite SwiveLock anchor, 2 lateral row 5.5 mm BioComposite Arthrex SwiveLock anchors, and 3 FiberWire sutures, and 1 FiberTape.    PLAN:  Continue with formal physical therapy.  We did go over wall walking today once again he was able to perform this in the office he understands importance of doing this once per hour every hour awake.  His questions and concerns were answered.  He will follow-up in 6 1/2 weeks and I will need PT notes.  Phase 3 - on 4/5 - out of all gear then.    Edward Jain D.O.  Orthopaedic Surgeon    [facility]  [address] [place] [address]  Phone [phone] (KNEE)  Fax [phone]    Disclaimer:  This note consists of words and symbols derived from keyboarding, dictation, or using voice recognition software. As a result, there may be errors in the script that have gone undetected. Please consider this when interpreting information found in this note.    8:56 AM 3/30/2018

## 2018-03-30 NOTE — NURSING NOTE
Patient is here for a follow up on his right shoulder.  DOS: 2/8/18  Martha Song LPN .......3/30/2018 8:28 AM

## 2018-03-30 NOTE — MR AVS SNAPSHOT
After Visit Summary   3/30/2018    Denton Palacios    MRN: [id number]           Patient Information     Date Of Birth          1960        Visit Information        Provider Department      3/30/2018 8:30 AM Edward Jain DO [facility]        Today's Diagnoses     S/P arthroscopy of right shoulder    -  1       Follow-ups after your visit        Follow-up notes from your care team     Return in about 6 weeks (around 5/11/2018).      Your next 10 appointments already scheduled     Mar 30, 2018  9:30 AM CDT   Treatment with Shanique Beverly, DENISE   [facility] ([facility])    [address] [address]   [phone]            Apr 04, 2018  8:15 AM CDT   Treatment with Darnell Acuna, PT   [facility] ([facility])    [address] [address]   [phone]            Apr 06, 2018  8:15 AM CDT   Treatment with Darnell Acuna, PT   [facility] ([facility])    [address] [address]   [phone]            Apr 09, 2018  9:00 AM CDT   Treatment with Shanique Beverly PTA   [facility] ([facility])    [address] [address]   [phone]            Apr 11, 2018  8:15 AM CDT   Treatment with Darnell Acuna, PT   [facility] ([facility])    [address] [address]   [phone]            Apr 16, 2018  9:00 AM CDT   Treatment with Shanique Beverly, DENISE   [facility] ([facility])    [address] [address]   [phone]            Apr 18, 2018  8:15 AM CDT   Treatment with Darnell Acuna, PT   [facility] ([facility])    [address] [address]  "  [phone]            Apr 23, 2018  9:00 AM CDT   Treatment with Shanique Beverly, PTA   [facility] ([facility])    [address] [facility] [address] [phone]            Apr 25, 2018  8:15 AM CDT   Treatment with Darnell Acuna, PT   [facility] ([facility])    [address] [facility] [address] [phone]            Apr 30, 2018  9:00 AM CDT   Treatment with Shanique Beverly, PTA   [facility] ([facility])    [address] [facility] [address] [phone]              Who to contact     If you have questions or need follow up information about today's clinic visit or your schedule please contact [facility] directly at [phone].  Normal or non-critical lab and imaging results will be communicated to you by MyChart, letter or phone within 4 business days after the clinic has received the results. If you do not hear from us within 7 days, please contact the clinic through WeLike[employer] or phone. If you have a critical or abnormal lab result, we will notify you by phone as soon as possible.  Submit refill requests through [employer] or call your pharmacy and they will forward the refill request to us. Please allow 3 business days for your refill to be completed.          Additional Information About Your Visit        Care EveryWhere ID     This is your Care EveryWhere ID. This could be used by other organizations to access your [place] medical records  [id number]        Your Vitals Were     Pulse Height BMI (Body Mass Index)             56 1.88 m (6' 2\") 38.52 kg/m2          Blood Pressure from Last 3 Encounters:   03/30/18 120/80   02/13/18 162/80   01/25/18 126/78    Weight from Last 3 Encounters:   03/30/18 136.1 kg (300 lb)   02/13/18 136.1 kg (300 lb)   01/25/18 (!) 140.4 kg (309 lb 9.6 oz)              Today, you had the " following     No orders found for display       Primary Care Provider Office Phone # Fax #    Beto Robertson [phone] [address]  [place] [address]        Equal Access to Services     ASAD OROZCO : Hadii aad ku hadleonorao Soceeali, waaxda luqadaha, qaybta kaalmada adechristianoyada, alize bernicein hayaabrennen teechristiano youngroz clark. So [facility] [phone].    ATENCIÓN: Si habla español, tiene a membreno disposición servicios gratuitos de asistencia lingüística. Llame al [phone].    We comply with applicable federal civil rights laws and [place] laws. We do not discriminate on the basis of race, color, national origin, age, disability, sex, sexual orientation, or gender identity.            Thank you!     Thank you for choosing [facility]  for your care. Our goal is always to provide you with excellent care. Hearing back from our patients is one way we can continue to improve our services. Please take a few minutes to complete the written survey that you may receive in the mail after your visit with us. Thank you!             Your Updated Medication List - Protect others around you: Learn how to safely use, store and throw away your medicines at www.disposemymeds.org.          This list is accurate as of 3/30/18  8:59 AM.  Always use your most recent med list.                   Brand Name Dispense Instructions for use Diagnosis    ibuprofen 800 MG tablet    ADVIL/MOTRIN     Take 1 tablet by mouth 3 times daily as needed

## 2018-04-04 ENCOUNTER — HOSPITAL ENCOUNTER (OUTPATIENT)
Dept: PHYSICAL THERAPY | Facility: OTHER | Age: 58
Setting detail: THERAPIES SERIES
End: 2018-04-04
Attending: ORTHOPAEDIC SURGERY
Payer: MEDICARE

## 2018-04-04 PROCEDURE — 97110 THERAPEUTIC EXERCISES: CPT | Mod: GP | Performed by: PHYSICAL THERAPIST

## 2018-04-04 PROCEDURE — 40000185 ZZHC STATISTIC PT OUTPT VISIT: Performed by: PHYSICAL THERAPIST

## 2018-04-06 ENCOUNTER — HOSPITAL ENCOUNTER (OUTPATIENT)
Dept: PHYSICAL THERAPY | Facility: OTHER | Age: 58
Setting detail: THERAPIES SERIES
End: 2018-04-06
Attending: ORTHOPAEDIC SURGERY
Payer: MEDICARE

## 2018-04-06 PROCEDURE — G8985 CARRY GOAL STATUS: HCPCS | Mod: GP,CI | Performed by: PHYSICAL THERAPIST

## 2018-04-06 PROCEDURE — G8984 CARRY CURRENT STATUS: HCPCS | Mod: GP,CK | Performed by: PHYSICAL THERAPIST

## 2018-04-06 PROCEDURE — 97110 THERAPEUTIC EXERCISES: CPT | Mod: GP | Performed by: PHYSICAL THERAPIST

## 2018-04-06 PROCEDURE — 40000185 ZZHC STATISTIC PT OUTPT VISIT: Performed by: PHYSICAL THERAPIST

## 2018-04-06 NOTE — PROGRESS NOTES
Outpatient Physical Therapy Progress Note     Patient: Denton Palacios  : 1960    Beginning/End Dates of Reporting Period:  18 to 2018    Referring Provider: Jain    Therapy Diagnosis: s/p shoulder surgery     Client Self Report: Little more sore in the shoulder now that he is out of the sling.    Objective Measurements:  Objective Measure: Subjective Pain Scale  Details: 0-3/10 - occasionally able to find a painfree position  Objective Measure: 3/8 85 P. 3/13 103 P. 3/15 103 P. 3/21 110 P. 3/23 125 AA.  3/28 135 AA.  3/30 131 AA.   130 AA.    130 AA, 100 A.    Details: R shoulder flexion. P=PROM. AA=AAROM. A=AROM     Goals:  Goal Identifier Sleeping   Goal Description Pt to sleep through the night without interuption due to pain for improved wellness   Target Date 04/10/18   Date Met      Progress:     Goal Identifier ADLs   Goal Description Pt will use R shoulder for dressing, meal prep, showering for improved ADLs   Target Date 04/10/18   Date Met      Progress:     Goal Identifier ROM   Goal Description Pt to be able to reach overhead for improved ADLs and shoulder mobility   Target Date 04/10/18   Date Met      Progress:     Goal Identifier ADLs   Goal Description Pt to be able use his R shoulder/UE for activities at prior level of functions   Target Date 18   Date Met      Progress:     Progress Toward Goals:   Progress this reporting period: improved R shoulder ROM and strength    Plan:  Continue therapy per current plan of care.    Discharge:  No

## 2018-04-09 ENCOUNTER — HOSPITAL ENCOUNTER (OUTPATIENT)
Dept: PHYSICAL THERAPY | Facility: OTHER | Age: 58
Setting detail: THERAPIES SERIES
End: 2018-04-09
Attending: ORTHOPAEDIC SURGERY
Payer: MEDICARE

## 2018-04-09 PROCEDURE — 97110 THERAPEUTIC EXERCISES: CPT | Mod: GP

## 2018-04-09 PROCEDURE — 40000185 ZZHC STATISTIC PT OUTPT VISIT

## 2018-04-11 ENCOUNTER — HOSPITAL ENCOUNTER (OUTPATIENT)
Dept: PHYSICAL THERAPY | Facility: OTHER | Age: 58
Setting detail: THERAPIES SERIES
End: 2018-04-11
Attending: FAMILY MEDICINE
Payer: MEDICARE

## 2018-04-11 PROCEDURE — 97110 THERAPEUTIC EXERCISES: CPT | Mod: GP | Performed by: PHYSICAL THERAPIST

## 2018-04-11 PROCEDURE — 40000185 ZZHC STATISTIC PT OUTPT VISIT: Performed by: PHYSICAL THERAPIST

## 2018-04-11 PROCEDURE — 97140 MANUAL THERAPY 1/> REGIONS: CPT | Mod: GP | Performed by: PHYSICAL THERAPIST

## 2018-04-16 ENCOUNTER — HOSPITAL ENCOUNTER (OUTPATIENT)
Dept: PHYSICAL THERAPY | Facility: OTHER | Age: 58
Setting detail: THERAPIES SERIES
End: 2018-04-16
Attending: ORTHOPAEDIC SURGERY
Payer: MEDICARE

## 2018-04-16 PROCEDURE — 40000185 ZZHC STATISTIC PT OUTPT VISIT

## 2018-04-16 PROCEDURE — 97110 THERAPEUTIC EXERCISES: CPT | Mod: GP

## 2018-04-16 PROCEDURE — 97140 MANUAL THERAPY 1/> REGIONS: CPT | Mod: GP

## 2018-04-18 ENCOUNTER — HOSPITAL ENCOUNTER (OUTPATIENT)
Dept: PHYSICAL THERAPY | Facility: OTHER | Age: 58
Setting detail: THERAPIES SERIES
End: 2018-04-18
Attending: ORTHOPAEDIC SURGERY
Payer: MEDICARE

## 2018-04-18 PROCEDURE — 40000185 ZZHC STATISTIC PT OUTPT VISIT: Performed by: PHYSICAL THERAPIST

## 2018-04-18 PROCEDURE — 97110 THERAPEUTIC EXERCISES: CPT | Mod: GP | Performed by: PHYSICAL THERAPIST

## 2018-04-23 ENCOUNTER — HOSPITAL ENCOUNTER (OUTPATIENT)
Dept: PHYSICAL THERAPY | Facility: OTHER | Age: 58
Setting detail: THERAPIES SERIES
End: 2018-04-23
Attending: ORTHOPAEDIC SURGERY
Payer: MEDICARE

## 2018-04-23 PROCEDURE — 97110 THERAPEUTIC EXERCISES: CPT | Mod: GP

## 2018-04-23 PROCEDURE — 97140 MANUAL THERAPY 1/> REGIONS: CPT | Mod: GP

## 2018-04-23 PROCEDURE — 40000185 ZZHC STATISTIC PT OUTPT VISIT

## 2018-04-25 ENCOUNTER — HOSPITAL ENCOUNTER (OUTPATIENT)
Dept: PHYSICAL THERAPY | Facility: OTHER | Age: 58
Setting detail: THERAPIES SERIES
End: 2018-04-25
Attending: ORTHOPAEDIC SURGERY
Payer: MEDICARE

## 2018-04-25 PROCEDURE — 40000185 ZZHC STATISTIC PT OUTPT VISIT: Performed by: PHYSICAL THERAPIST

## 2018-04-25 PROCEDURE — 97140 MANUAL THERAPY 1/> REGIONS: CPT | Mod: GP | Performed by: PHYSICAL THERAPIST

## 2018-04-25 PROCEDURE — 97110 THERAPEUTIC EXERCISES: CPT | Mod: GP | Performed by: PHYSICAL THERAPIST

## 2018-04-30 ENCOUNTER — HOSPITAL ENCOUNTER (OUTPATIENT)
Dept: PHYSICAL THERAPY | Facility: OTHER | Age: 58
Setting detail: THERAPIES SERIES
End: 2018-04-30
Attending: ORTHOPAEDIC SURGERY
Payer: MEDICARE

## 2018-04-30 PROCEDURE — 40000185 ZZHC STATISTIC PT OUTPT VISIT

## 2018-04-30 PROCEDURE — 97110 THERAPEUTIC EXERCISES: CPT | Mod: GP

## 2018-04-30 PROCEDURE — 97140 MANUAL THERAPY 1/> REGIONS: CPT | Mod: GP

## 2018-05-02 ENCOUNTER — HOSPITAL ENCOUNTER (OUTPATIENT)
Dept: PHYSICAL THERAPY | Facility: OTHER | Age: 58
Setting detail: THERAPIES SERIES
End: 2018-05-02
Attending: ORTHOPAEDIC SURGERY
Payer: MEDICARE

## 2018-05-02 PROCEDURE — 97110 THERAPEUTIC EXERCISES: CPT | Mod: GP | Performed by: PHYSICAL THERAPIST

## 2018-05-02 PROCEDURE — 40000185 ZZHC STATISTIC PT OUTPT VISIT: Performed by: PHYSICAL THERAPIST

## 2018-05-02 PROCEDURE — 97140 MANUAL THERAPY 1/> REGIONS: CPT | Mod: GP | Performed by: PHYSICAL THERAPIST

## 2018-05-07 ENCOUNTER — HOSPITAL ENCOUNTER (OUTPATIENT)
Dept: PHYSICAL THERAPY | Facility: OTHER | Age: 58
Setting detail: THERAPIES SERIES
End: 2018-05-07
Attending: ORTHOPAEDIC SURGERY
Payer: MEDICARE

## 2018-05-07 PROCEDURE — 97140 MANUAL THERAPY 1/> REGIONS: CPT | Mod: GP

## 2018-05-07 PROCEDURE — 40000185 ZZHC STATISTIC PT OUTPT VISIT

## 2018-05-07 PROCEDURE — 97110 THERAPEUTIC EXERCISES: CPT | Mod: GP

## 2018-05-09 ENCOUNTER — HOSPITAL ENCOUNTER (OUTPATIENT)
Dept: PHYSICAL THERAPY | Facility: OTHER | Age: 58
Setting detail: THERAPIES SERIES
End: 2018-05-09
Attending: ORTHOPAEDIC SURGERY
Payer: MEDICARE

## 2018-05-09 PROCEDURE — 40000185 ZZHC STATISTIC PT OUTPT VISIT: Performed by: PHYSICAL THERAPIST

## 2018-05-09 PROCEDURE — G8985 CARRY GOAL STATUS: HCPCS | Mod: GP,CI | Performed by: PHYSICAL THERAPIST

## 2018-05-09 PROCEDURE — 97110 THERAPEUTIC EXERCISES: CPT | Mod: GP | Performed by: PHYSICAL THERAPIST

## 2018-05-09 PROCEDURE — G8984 CARRY CURRENT STATUS: HCPCS | Mod: GP,CJ | Performed by: PHYSICAL THERAPIST

## 2018-05-09 NOTE — PROGRESS NOTES
[facility]          OUTPATIENT PHYSICAL THERAPY ORTHOPEDIC EVALUATION  PLAN OF TREATMENT FOR OUTPATIENT REHABILITATION  (COMPLETE FOR INITIAL CLAIMS ONLY)  Patient's Last Name, First Name, M.I.  YOB: 1960  Denton Palacios    Provider s Name:  [facility]   Medical Record No.  [id number]   Start of Care Date:      Onset Date:   2/13/18   Type:     _X__PT   ___OT   ___SLP Medical Diagnosis:   S/p R shoulder surger, Z98.89     PT Diagnosis:   S/p R shoulder surger, Z98.89   Visits from SOC:  1      _________________________________________________________________________________  Plan of Treatment/Functional Goals:              Goals  Goal Identifier: Sleeping  Goal Description: Pt to sleep through the night without interuption due to pain for improved wellness  Target Date: 04/10/18    Goal Identifier: ADLs  Goal Description: Pt will use R shoulder for dressing, meal prep, showering for improved ADLs  Target Date: 04/10/18    Goal Identifier: ROM  Goal Description: Pt to be able to reach overhead for improved ADLs and shoulder mobility  Target Date: 04/10/18    Goal Identifier: ADLs  Goal Description: Pt to be able use his R shoulder/UE for activities at prior level of functions  Target Date: 05/08/18        Therapy Frequency:   1-2x/wk  Predicted Duration of Therapy Intervention:   8 weeks    Darnell Acuna, PT                 I CERTIFY THE NEED FOR THESE SERVICES FURNISHED UNDER        THIS PLAN OF TREATMENT AND WHILE UNDER MY CARE     (Physician co-signature of this document indicates review and certification of the therapy plan).                       Certification Date From:    5/9/18  Certification Date To:   7/4/18    Referring Provider:   Dr Jain    Initial Assessment        See Epic Evaluation

## 2018-05-09 NOTE — PROGRESS NOTES
"Outpatient Physical Therapy Progress Note     Patient: Denton Palacios  : 1960    Beginning/End Dates of Reporting Period:  18 to 2018    Referring Provider: Jain    Therapy Diagnosis: post op R shoulder, Z98.89     Client Self Report: Doing well, no concerns or questions. Gets sore the more he uses it but it's the \"good soreness\". Sees Dr Jain next week.    Objective Measurements:  Objective Measure: Subjective pain rating  Details: 0-1/10 - R shoulder     Objective Measure: 3/8 85 P. 3/13 103 P. 3/15 103 P. 3/21 110 P. 3/23 125 AA.  3/28 135 AA.  3/30 131 AA.   130 AA.    130 AA, 100 A.   105 A standing, 135 AA supine wand.  : 130 A, 140 AA.   130 A 140 AA standing.:  140 A.    145 A.  :  150 A.   150 A.  5: 155 A.  5/ 150 A.  5/9: near comparable to uninvolved  Details: R shoulder flexion. P=PROM. AA=AAROM. A=AROM       Goals:  Goal Identifier Sleeping   Goal Description Pt to sleep through the night without interuption due to pain for improved wellness   Target Date 04/10/18   Date Met      Progress:     Goal Identifier ADLs   Goal Description Pt will use R shoulder for dressing, meal prep, showering for improved ADLs   Target Date 04/10/18   Date Met  18   Progress:     Goal Identifier ROM   Goal Description Pt to be able to reach overhead for improved ADLs and shoulder mobility   Target Date 04/10/18   Date Met      Progress:     Goal Identifier ADLs   Goal Description Pt to be able use his R shoulder/UE for activities at prior level of functions   Target Date 18   Date Met  18   Progress:       Progress Toward Goals:   Progress this reporting period: improved R shoulder ROM and strength    Plan:  Continue therapy per current plan of care.    Discharge:  No  "

## 2018-05-15 ENCOUNTER — OFFICE VISIT (OUTPATIENT)
Dept: ORTHOPEDICS | Facility: OTHER | Age: 58
End: 2018-05-15
Attending: ORTHOPAEDIC SURGERY
Payer: COMMERCIAL

## 2018-05-15 VITALS
BODY MASS INDEX: 38.5 KG/M2 | DIASTOLIC BLOOD PRESSURE: 78 MMHG | SYSTOLIC BLOOD PRESSURE: 130 MMHG | WEIGHT: 300 LBS | HEART RATE: 56 BPM | HEIGHT: 74 IN

## 2018-05-15 DIAGNOSIS — Z98.890 S/P ARTHROSCOPY OF SHOULDER: Primary | ICD-10-CM

## 2018-05-15 PROBLEM — M75.121 COMPLETE TEAR OF RIGHT ROTATOR CUFF: Status: ACTIVE | Noted: 2018-01-22

## 2018-05-15 PROCEDURE — G0463 HOSPITAL OUTPT CLINIC VISIT: HCPCS

## 2018-05-15 PROCEDURE — 99213 OFFICE O/P EST LOW 20 MIN: CPT | Performed by: ORTHOPAEDIC SURGERY

## 2018-05-15 ASSESSMENT — PAIN SCALES - GENERAL: PAINLEVEL: MILD PAIN (2)

## 2018-05-15 NOTE — NURSING NOTE
Patient is here for a follow up on his right shoulder.  DOS: 2/8/18  Martha Song LPN .......5/15/2018 8:14 AM

## 2018-05-15 NOTE — MR AVS SNAPSHOT
"              After Visit Summary   5/15/2018    Denton Palacios    MRN: [id number]           Patient Information     Date Of Birth          1960        Visit Information        Provider Department      5/15/2018 8:15 AM Edward Jain DO [facility]        Today's Diagnoses     S/P arthroscopy of right shoulder    -  1       Follow-ups after your visit        Follow-up notes from your care team     Return in about 2 months (around 7/15/2018).      Your next 10 appointments already scheduled     May 16, 2018  8:15 AM CDT   Treatment with Darnell Acuna, PT   [facility] ([facility])    [address] [facility] [address] [phone]              Who to contact     If you have questions or need follow up information about today's clinic visit or your schedule please contact [facility] directly at [phone].  Normal or non-critical lab and imaging results will be communicated to you by MyChart, letter or phone within 4 business days after the clinic has received the results. If you do not hear from us within 7 days, please contact the clinic through MyChart or phone. If you have a critical or abnormal lab result, we will notify you by phone as soon as possible.  Submit refill requests through BUX or call your pharmacy and they will forward the refill request to us. Please allow 3 business days for your refill to be completed.          Additional Information About Your Visit        Care EveryWhere ID     This is your Care EveryWhere ID. This could be used by other organizations to access your [place] medical records  [id number]        Your Vitals Were     Pulse Height BMI (Body Mass Index)             56 1.88 m (6' 2\") 38.52 kg/m2          Blood Pressure from Last 3 Encounters:   05/15/18 130/78   03/30/18 120/80   02/13/18 162/80    Weight from Last 3 Encounters:   05/15/18 136.1 kg (300 lb)   03/30/18 " 136.1 kg (300 lb)   02/13/18 136.1 kg (300 lb)              Today, you had the following     No orders found for display       Primary Care Provider Office Phone # Fax #    Beto Robertson [phone] [address]  [facility] [address]        Equal Access to Services     [facility]: Hadii aad ku hadasho Soomaali, waaxda luqadaha, qaybta kaalmada adeegyada, waxwaldo queenin hayaan adechristiano moralesararoz shukla . So [facility] [phone].    ATENCIÓN: Si habla español, tiene a membreno disposición servicios gratuitos de asistencia lingüística. Llame al [phone].    We comply with applicable federal civil rights laws and [place] laws. We do not discriminate on the basis of race, color, national origin, age, disability, sex, sexual orientation, or gender identity.            Thank you!     Thank you for choosing [facility] [place] [facility]  for your care. Our goal is always to provide you with excellent care. Hearing back from our patients is one way we can continue to improve our services. Please take a few minutes to complete the written survey that you may receive in the mail after your visit with us. Thank you!             Your Updated Medication List - Protect others around you: Learn how to safely use, store and throw away your medicines at www.disposemymeds.org.      Notice  As of 5/15/2018  8:34 AM    You have not been prescribed any medications.

## 2018-05-15 NOTE — PROGRESS NOTES
"PROGRESS NOTE    SUBJECTIVE:  Denton Palacios is here for evaluation in regards to right shoulder surgery.  He is now 13 weeks and 5 days out from shoulder repair.  Overall he is doing well does feel some weakness with certain activities which would be anticipated.  He still working on his range of motion exercises and that seems to be helping.  He does this every day.  Overall he is happy with this progression so far.    OBJECTIVE:  /78 (BP Location: Right arm, Patient Position: Sitting, Cuff Size: Adult Large)  Pulse 56  Ht 1.88 m (6' 2\")  Wt 136.1 kg (300 lb)  BMI 38.52 kg/m2 Body mass index is 38.52 kg/(m^2).    General Appearance: Pleasant 57 year old male in good appearance, mood and affect.  Alert and orientated times three ( time, date and location).    Skin: Intact incision sites have healed well.    Shoulder:  Motion: Passive forward flexion standing here today was approximately 180 , active forward flexion is 170  with slight scapular substitution.  Abduction is about 10  short actively passively it is full external rotation 45 , internal rotation easily across his abdomen into his back pocket.  Strength: Expected weakness but there has been improvement.    Elbow:  Motion: Full motion.    Hand:  Thenar wasting: no.  Hypothenar wasting: no.  Sensation: Intact.  Radial and ulnar blood flow:  normal.    Eyes: Pupils are round.    Ears: Hearing: Intact.    Heart: good capillary refill into his hands pulses are regular.    Lungs: Distant breath sounds.    Physical therapy: Patient is making good gains with his therapy he is on protocol and moving forward.    Notes were reviewed with the patient.  Please refer to the reports for full details.    IMPRESSION:    POSTOPERATIVE DIAGNOSES:  1.  Tear of the supraspinatus and infraspinatus, right shoulder.  2.  Impingement syndrome.  3.  Acromioclavicular arthritis.  4.  Bicipital partial tearing.  5.  Degenerative labral tearing.  6.  Excellent cartilage, " humeral head and glenoid.     PROCEDURES:  1.  Right shoulder arthroscopy with extensive debridement to include 2% anterior to posterior labrum done from the glenohumeral joint (DOS 02/08/2018).  2.  Extensive debridement, 4% of the supraspinatus and infraspinatus done from the subacromial space.  3.  Arthroscopic biceps tenotomy.  4.  Arthroscopic subacromial decompression.  5.  Arthroscopic distal clavicle excision, approximately 15 mm width of resection completed.  6.  Arthroscopic rotator cuff repair utilizing 1 medial row 5.5 mm BioComposite SwiveLock anchor, 2 lateral row 5.5 mm BioComposite Arthrex SwiveLock anchors, and 3 FiberWire sutures, and 1 FiberTape.    PLAN:  He may finish up with formal therapy as long as he continues to do this at home.  We did go over wall walking today once again he was able to perform this in the office he understands importance of doing this once per hour every hour awake.  His questions and concerns were answered.  He was encouraged to do things with his elbow at his side and if he has to go overhead to keep it in front of all.  He will follow-up in 2 months with x-ray first.    Edward Jain D.O.  Orthopaedic Surgeon    [facility] [place] [address]  Phone [phone] (KNEE)  Fax [phone]    Disclaimer:  This note consists of words and symbols derived from keyboarding, dictation, or using voice recognition software. As a result, there may be errors in the script that have gone undetected. Please consider this when interpreting information found in this note.    8:32 AM 5/15/2018

## 2018-05-16 ENCOUNTER — HOSPITAL ENCOUNTER (OUTPATIENT)
Dept: PHYSICAL THERAPY | Facility: OTHER | Age: 58
Setting detail: THERAPIES SERIES
End: 2018-05-16
Attending: ORTHOPAEDIC SURGERY
Payer: MEDICARE

## 2018-05-16 PROCEDURE — 40000185 ZZHC STATISTIC PT OUTPT VISIT: Performed by: PHYSICAL THERAPIST

## 2018-05-16 PROCEDURE — 97110 THERAPEUTIC EXERCISES: CPT | Mod: GP | Performed by: PHYSICAL THERAPIST

## 2018-05-17 ENCOUNTER — OFFICE VISIT (OUTPATIENT)
Dept: FAMILY MEDICINE | Facility: OTHER | Age: 58
End: 2018-05-17
Attending: NURSE PRACTITIONER
Payer: COMMERCIAL

## 2018-05-17 VITALS
TEMPERATURE: 98 F | HEART RATE: 50 BPM | WEIGHT: 313.7 LBS | HEIGHT: 74 IN | OXYGEN SATURATION: 96 % | RESPIRATION RATE: 18 BRPM | BODY MASS INDEX: 40.26 KG/M2

## 2018-05-17 DIAGNOSIS — J22 LOWER RESP. TRACT INFECTION: Primary | ICD-10-CM

## 2018-05-17 PROCEDURE — G0463 HOSPITAL OUTPT CLINIC VISIT: HCPCS

## 2018-05-17 PROCEDURE — 99213 OFFICE O/P EST LOW 20 MIN: CPT | Performed by: NURSE PRACTITIONER

## 2018-05-17 RX ORDER — AZITHROMYCIN 250 MG/1
TABLET, FILM COATED ORAL
Qty: 6 TABLET | Refills: 0 | Status: SHIPPED | OUTPATIENT
Start: 2018-05-17 | End: 2018-05-27

## 2018-05-17 ASSESSMENT — ENCOUNTER SYMPTOMS
FEVER: 0
SORE THROAT: 0
COUGH: 1
SHORTNESS OF BREATH: 1

## 2018-05-17 ASSESSMENT — PAIN SCALES - GENERAL: PAINLEVEL: NO PAIN (0)

## 2018-05-17 NOTE — MR AVS SNAPSHOT
"              After Visit Summary   5/17/2018    Denton Palacios    MRN: [id number]           Patient Information     Date Of Birth          1960        Visit Information        Provider Department      5/17/2018 6:45 PM Allison Jarvis APRN CNP [facility]        Today's Diagnoses     Lower resp. tract infection    -  1       Follow-ups after your visit        Your next 10 appointments already scheduled     Jul 16, 2018  8:15 AM CDT   Return Visit with Edward Jain DO   [facility] ([facility])    [address]  [place] [id number] [phone]              Who to contact     If you have questions or need follow up information about today's clinic visit or your schedule please contact [facility] directly at [phone].  Normal or non-critical lab and imaging results will be communicated to you by MyChart, letter or phone within 4 business days after the clinic has received the results. If you do not hear from us within 7 days, please contact the clinic through MyChart or phone. If you have a critical or abnormal lab result, we will notify you by phone as soon as possible.  Submit refill requests through [employer] or call your pharmacy and they will forward the refill request to us. Please allow 3 business days for your refill to be completed.          Additional Information About Your Visit        Care EveryWhere ID     This is your Care EveryWhere ID. This could be used by other organizations to access your [place] medical records  [id number]        Your Vitals Were     Pulse Temperature Respirations Height Pulse Oximetry BMI (Body Mass Index)    50 98  F (36.7  C) (Tympanic) 18 6' 2\" (1.88 m) 96% 40.28 kg/m2       Blood Pressure from Last 3 Encounters:   05/15/18 130/78   03/30/18 120/80   02/13/18 162/80    Weight from Last 3 Encounters:   05/17/18 313 lb 11.2 oz (142.3 kg)   05/15/18 300 lb " (136.1 kg)   03/30/18 300 lb (136.1 kg)              Today, you had the following     No orders found for display         Today's Medication Changes          These changes are accurate as of 5/17/18  7:30 PM.  If you have any questions, ask your nurse or doctor.               Start taking these medicines.        Dose/Directions    azithromycin 250 MG tablet   Commonly known as:  ZITHROMAX   Used for:  Lower resp. tract infection   Started by:  Allison Jarvis APRN CNP        Two tablets first day, then one tablet daily for four days.   Quantity:  6 tablet   Refills:  0            Where to get your medicines      These medications were sent to [facility] [address] [facility], [place] - [address] [address]     Phone:  [phone]     azithromycin 250 MG tablet                Primary Care Provider Office Phone # Fax #    Beto Robertson [phone]       [address] [facility] [address]        Equal Access to Services     [facility] AH: Hadii aad ku hadasho Soomaali, waaxda luqadaha, qaybta kaalmada adeegyada, waxay idiin hayclayn mickey shukla . So [facility] [phone].    ATENCIÓN: Si habla español, tiene a membreno disposición servicios gratuitos de asistencia lingüística. Llame al [phone].    We comply with applicable federal civil rights laws and [place] laws. We do not discriminate on the basis of race, color, national origin, age, disability, sex, sexual orientation, or gender identity.            Thank you!     Thank you for choosing [facility] hospitals  for your care. Our goal is always to provide you with excellent care. Hearing back from our patients is one way we can continue to improve our services. Please take a few minutes to complete the written survey that you may receive in the mail after your visit with us. Thank you!             Your Updated Medication List - Protect others around you: Learn  how to safely use, store and throw away your medicines at www.disposemymeds.org.          This list is accurate as of 5/17/18  7:30 PM.  Always use your most recent med list.                   Brand Name Dispense Instructions for use Diagnosis    azithromycin 250 MG tablet    ZITHROMAX    6 tablet    Two tablets first day, then one tablet daily for four days.    Lower resp. tract infection

## 2018-05-18 NOTE — NURSING NOTE
Patient presents to the clinic for URI. S/sx include runny nose, productive cough with light green mucus. Some congestion and feels like both ears are plugged.  Started about 3 weeks ago. Has been afebrile.   Laila Andino LPN............. May 17, 2018 7:06 PM

## 2018-05-18 NOTE — PROGRESS NOTES
HPI Comments: Nursing Notes:   Laila Andino LPN  5/17/2018  7:06 PM  Unsigned  Patient presents to the clinic for URI. S/sx include runny nose,   productive cough with light green mucus. Some congestion and feels like   both ears are plugged.  Started about 3 weeks ago. Has been afebrile.   Laila Andino LPN............. May 17, 2018 7:06 PM       Ears are plugged, drainage in the back of throat, coughing up phlegm that is light green, occasional SOB with exertion, congestion and runny nose-symptoms started three weeks ago. Denies fevers, sore throat pain, ear pain. Treating symptoms with Vitamin C. Eating and drinking without difficulty.     URI   Associated symptoms include coughing. Pertinent negatives include no fever or sore throat.         Review of Systems   Constitutional: Negative for fever.   HENT: Negative for ear pain and sore throat.    Respiratory: Positive for cough and shortness of breath.          Physical Exam   Constitutional: He is well-developed, well-nourished, and in no distress.   HENT:   Right Ear: External ear normal.   Left Ear: External ear normal.   Cardiovascular: Normal heart sounds.    Pulmonary/Chest: Breath sounds normal.   Lymphadenopathy:     He has no cervical adenopathy.   Neurological: He is alert.   Skin: Skin is warm and dry.   Psychiatric: Affect normal.     Assessment: on exam, well appearing male without fever, lungs clear to ausculation, tms without erythema, tonsils without erythema    Diagnosis: Lower Respiratory Tract Infection    Treat with Zithromax 500 mgs today then 250 mgs days 2-5  Mucinex prn   Follow up as needed

## 2018-05-27 ENCOUNTER — OFFICE VISIT (OUTPATIENT)
Dept: FAMILY MEDICINE | Facility: OTHER | Age: 58
End: 2018-05-27
Attending: PHYSICIAN ASSISTANT
Payer: COMMERCIAL

## 2018-05-27 VITALS
BODY MASS INDEX: 40.22 KG/M2 | DIASTOLIC BLOOD PRESSURE: 76 MMHG | WEIGHT: 313.4 LBS | TEMPERATURE: 98.3 F | SYSTOLIC BLOOD PRESSURE: 130 MMHG | HEIGHT: 74 IN | OXYGEN SATURATION: 95 % | HEART RATE: 53 BPM

## 2018-05-27 DIAGNOSIS — J01.00 SUBACUTE MAXILLARY SINUSITIS: Primary | ICD-10-CM

## 2018-05-27 PROCEDURE — 99213 OFFICE O/P EST LOW 20 MIN: CPT | Performed by: PHYSICIAN ASSISTANT

## 2018-05-27 PROCEDURE — G0463 HOSPITAL OUTPT CLINIC VISIT: HCPCS

## 2018-05-27 RX ORDER — LACTOBACILLUS RHAMNOSUS GG 10B CELL
1 CAPSULE ORAL DAILY
Qty: 30 CAPSULE | Refills: 0 | Status: SHIPPED | OUTPATIENT
Start: 2018-05-27 | End: 2020-01-29

## 2018-05-27 ASSESSMENT — PAIN SCALES - GENERAL: PAINLEVEL: NO PAIN (0)

## 2018-05-27 NOTE — NURSING NOTE
Patient was seen on the 17th and had a zpak. Patient still has plugged ears and nasal drainage. Patient states it went away and then came back. Maty Prado LPN .............5/27/2018  10:09 AM

## 2018-05-27 NOTE — PROGRESS NOTES
"SUBJECTIVE: Denton Palacios is a 57 year old male patient complaining of sinus congestion, pressure, ear pressure, PDN, slight cough, yellow sputum for 6 week(s).  Course waxing and waning. Was seen and treated in  and treated with azithromycin on 5/17/18. His symptoms improved but did not resolve. Since he stopped the antibiotic his symptoms got worse again.     Past Medical History:   Diagnosis Date     Complete tear of right rotator cuff     1/22/2018     Impingement syndrome of right shoulder     1/2/2018     Olecranon bursitis of right elbow     4/14/2016     Other shoulder lesions, right shoulder     1/2/2018     Other specified postprocedural states     2/8/2018     Pneumonia     2/14/2012     Primary osteoarthritis of right shoulder     1/2/2018     S/P arthroscopy of right shoulder 2/13/2018     No current outpatient prescriptions on file.     No current facility-administered medications for this visit.         Allergies   Allergen Reactions     Morphine Nausea and Vomiting     ROS  General: slight fatigue, no fevers  HEENT: sinus congestion and drainage per HPI  Cardiac: negative  Respiratory: slight cough from PND  Abdomen: negative    OBJECTIVE:   /76 (BP Location: Right arm, Patient Position: Sitting, Cuff Size: Adult Large)  Pulse 53  Temp 98.3  F (36.8  C) (Tympanic)  Ht 6' 2.21\" (1.885 m)  Wt 313 lb 6.4 oz (142.2 kg)  SpO2 95%  BMI 40.01 kg/m2    EARS: TM's injected bilaterally  NOSE/SINUS: positive findings: mucosa erythematous and swollen  Sinus palpation: Frontal sinus and Maxillary sinus nontender to palpation   THROAT: moderate erythema   NECK:Neck supple. No adenopathy.  CHEST: Clear to auscultation    ASSESSMENT:   (J01.00) Subacute maxillary sinusitis  (primary encounter diagnosis)    Plan: amoxicillin-clavulanate (AUGMENTIN) 875-125 MG         per tablet, Lactobacillus-Inulin (CULTURELLE         DIGESTIVE HEALTH) CAPS      RX per EPIC   Discussed symptomatic treatments per " AVS  Return to clinic if symptoms persist or worsen  Patient received verbal and written instruction including review of warning signs    Tori Cole PA-C

## 2018-05-27 NOTE — PATIENT INSTRUCTIONS
Recurrent sinusitis  Start Augmentin oral tablet, take twice daily for 10-14 days. Use a probiotic while on this medicine. Take with food.   Warm compress, hot steamy shower  OTC decongestant (sudafed), OTC 3 day nasal spray - Afrin, OTC inhaled corticosteroid - Flonase, OTC antihistamine - cetirizine as directed, OTC Nasal sinus rinse.   Ibuprofen or tylenol as directed for discomfort or fever  Return to clinic if symptoms persist or worsen  Seek immediate care for    Facial pain or headache that gets worse    Stiff neck    Unusual drowsiness or confusion    Swelling of your forehead or eyelids    Vision problems, such as blurred or double vision    Fever of 100.4 F (38 C) or higher, or as directed by your healthcare provider    Seizure    Breathing problems    Symptoms don't go away in 10 days    Sinusitis (Antibiotic Treatment)    The sinuses are air-filled spaces within the bones of the face. They connect to the inside of the nose. Sinusitis is an inflammation of the tissue that lines the sinuses. Sinusitis can occur during a cold. It can also happen due to allergies to pollens and other particles in the air. Sinusitis can cause symptoms of sinus congestion and a feeling of fullness. A sinus infection causes fever, headache, and facial pain. There is often green or yellow fluid draining from the nose or into the back of the throat (post-nasal drip). You have been given antibiotics to treat this condition.  Home care    Take the full course of antibiotics as instructed. Do not stop taking them, even when you feel better.    Drink plenty of water, hot tea, and other liquids. This may help thin nasal mucus. It also may help your sinuses drain fluids.    Heat may help soothe painful areas of your face. Use a towel soaked in hot water. Or,  the shower and direct the warm spray onto your face. Using a vaporizer along with a menthol rub at night may also help soothe symptoms.     An expectorant with guaifenesin  may help thin nasal mucus and help your sinuses drain fluids.    You can use an over-the-counter decongestant, unless a similar medicine was prescribed to you. Nasal sprays work the fastest. Use one that contains phenylephrine or oxymetazoline. First blow your nose gently. Then use the spray. Do not use these medicines more often than directed on the label. If you do, your symptoms may get worse. You may also take pills that contain pseudoephedrine. Don t use products that combine multiple medicines. This is because side effects may be increased. Read labels. You can also ask the pharmacist for help. (People with high blood pressure should not use decongestants. They can raise blood pressure.)    Over-the-counter antihistamines may help if allergies contributed to your sinusitis.      Do not use nasal rinses or irrigation during an acute sinus infection, unless your healthcare provider tells you to. Rinsing may spread the infection to other areas in your sinuses.    Use acetaminophen or ibuprofen to control pain, unless another pain medicine was prescribed to you. If you have chronic liver or kidney disease or ever had a stomach ulcer, talk with your healthcare provider before using these medicines. (Aspirin should never be taken by anyone under age 18 who is ill with a fever. It may cause severe liver damage.)    Don't smoke. This can make symptoms worse.  Follow-up care  Follow up with your healthcare provider or our staff if you are better in 1 week.  When to seek medical advice  Call your healthcare provider if any of these occur:    Facial pain or headache that gets worse    Stiff neck    Unusual drowsiness or confusion    Swelling of your forehead or eyelids    Vision problems, such as blurred or double vision    Fever of 100.4 F (38 C) or higher, or as directed by your healthcare provider    Seizure    Breathing problems    Symptoms don't go away in 10 days  Prevention  Here are steps you can take to help  prevent an infection:    Keep good hand washing habits.    Don t have close contact with people who have sore throats, colds, or other upper respiratory infections.    Don t smoke, and stay away from secondhand smoke.    Stay up to date with of your vaccines.  Date Last Reviewed: 11/1/2017 2000-2017 [employer]. [address] [place] [address], [place] [address]. All rights reserved. This information is not intended as a substitute for professional medical care. Always follow your healthcare professional's instructions.

## 2018-05-27 NOTE — MR AVS SNAPSHOT
After Visit Summary   5/27/2018    Denton Palacios    MRN: [id number]           Patient Information     Date Of Birth          1960        Visit Information        Provider Department      5/27/2018 10:00 AM Tori Cole PA [facility]        Today's Diagnoses     Subacute maxillary sinusitis    -  1      Care Instructions    Recurrent sinusitis  Start Augmentin oral tablet, take twice daily for 10-14 days. Use a probiotic while on this medicine. Take with food.   Warm compress, hot steamy shower  OTC decongestant (sudafed), OTC 3 day nasal spray - Afrin, OTC inhaled corticosteroid - Flonase, OTC antihistamine - cetirizine as directed, OTC Nasal sinus rinse.   Ibuprofen or tylenol as directed for discomfort or fever  Return to clinic if symptoms persist or worsen  Seek immediate care for    Facial pain or headache that gets worse    Stiff neck    Unusual drowsiness or confusion    Swelling of your forehead or eyelids    Vision problems, such as blurred or double vision    Fever of 100.4 F (38 C) or higher, or as directed by your healthcare provider    Seizure    Breathing problems    Symptoms don't go away in 10 days    Sinusitis (Antibiotic Treatment)    The sinuses are air-filled spaces within the bones of the face. They connect to the inside of the nose. Sinusitis is an inflammation of the tissue that lines the sinuses. Sinusitis can occur during a cold. It can also happen due to allergies to pollens and other particles in the air. Sinusitis can cause symptoms of sinus congestion and a feeling of fullness. A sinus infection causes fever, headache, and facial pain. There is often green or yellow fluid draining from the nose or into the back of the throat (post-nasal drip). You have been given antibiotics to treat this condition.  Home care    Take the full course of antibiotics as instructed. Do not stop taking them, even when you feel better.    Drink plenty of water, hot  tea, and other liquids. This may help thin nasal mucus. It also may help your sinuses drain fluids.    Heat may help soothe painful areas of your face. Use a towel soaked in hot water. Or,  the shower and direct the warm spray onto your face. Using a vaporizer along with a menthol rub at night may also help soothe symptoms.     An expectorant with guaifenesin may help thin nasal mucus and help your sinuses drain fluids.    You can use an over-the-counter decongestant, unless a similar medicine was prescribed to you. Nasal sprays work the fastest. Use one that contains phenylephrine or oxymetazoline. First blow your nose gently. Then use the spray. Do not use these medicines more often than directed on the label. If you do, your symptoms may get worse. You may also take pills that contain pseudoephedrine. Don t use products that combine multiple medicines. This is because side effects may be increased. Read labels. You can also ask the pharmacist for help. (People with high blood pressure should not use decongestants. They can raise blood pressure.)    Over-the-counter antihistamines may help if allergies contributed to your sinusitis.      Do not use nasal rinses or irrigation during an acute sinus infection, unless your healthcare provider tells you to. Rinsing may spread the infection to other areas in your sinuses.    Use acetaminophen or ibuprofen to control pain, unless another pain medicine was prescribed to you. If you have chronic liver or kidney disease or ever had a stomach ulcer, talk with your healthcare provider before using these medicines. (Aspirin should never be taken by anyone under age 18 who is ill with a fever. It may cause severe liver damage.)    Don't smoke. This can make symptoms worse.  Follow-up care  Follow up with your healthcare provider or our staff if you are better in 1 week.  When to seek medical advice  Call your healthcare provider if any of these occur:    Facial pain or  headache that gets worse    Stiff neck    Unusual drowsiness or confusion    Swelling of your forehead or eyelids    Vision problems, such as blurred or double vision    Fever of 100.4 F (38 C) or higher, or as directed by your healthcare provider    Seizure    Breathing problems    Symptoms don't go away in 10 days  Prevention  Here are steps you can take to help prevent an infection:    Keep good hand washing habits.    Don t have close contact with people who have sore throats, colds, or other upper respiratory infections.    Don t smoke, and stay away from secondhand smoke.    Stay up to date with of your vaccines.  Date Last Reviewed: 11/1/2017 2000-2017 [employer]. [address] [place] [address]. All rights reserved. This information is not intended as a substitute for professional medical care. Always follow your healthcare professional's instructions.                Follow-ups after your visit        Follow-up notes from your care team     Return if symptoms worsen or fail to improve.      Your next 10 appointments already scheduled     Jul 16, 2018  8:15 AM CDT   [facility] Edward Jain DO   [facility]    [address]  [place] [id number] [phone]              Who to contact     If you have questions or need follow up information about today's clinic visit or your schedule please contact [facility] directly at [phone].  Normal or non-critical lab and imaging results will be communicated to you by MyChart, letter or phone within 4 business days after the clinic has received the results. If you do not hear from us within 7 days, please contact the clinic through MyChart or phone. If you have a critical or abnormal lab result, we will notify you by phone as soon as possible.  Submit refill requests through [employer] or call your pharmacy and they will forward the refill  "request to us. Please allow 3 business days for your refill to be completed.          Additional Information About Your Visit        Care EveryWhere ID     This is your Care EveryWhere ID. This could be used by other organizations to access your [facility] medical records  [id number]        Your Vitals Were     Pulse Temperature Height Pulse Oximetry BMI (Body Mass Index)       53 98.3  F (36.8  C) (Tympanic) 6' 2.21\" (1.885 m) 95% 40.01 kg/m2        Blood Pressure from Last 3 Encounters:   05/27/18 130/76   05/15/18 130/78   03/30/18 120/80    Weight from Last 3 Encounters:   05/27/18 313 lb 6.4 oz (142.2 kg)   05/17/18 313 lb 11.2 oz (142.3 kg)   05/15/18 300 lb (136.1 kg)              Today, you had the following     No orders found for display         Today's Medication Changes          These changes are accurate as of 5/27/18 10:39 AM.  If you have any questions, ask your nurse or doctor.               Start taking these medicines.        Dose/Directions    amoxicillin-clavulanate 875-125 MG per tablet   Commonly known as:  AUGMENTIN   Used for:  Subacute maxillary sinusitis   Started by:  Tori Cole PA        Dose:  1 tablet   Take 1 tablet by mouth 2 times daily for 14 days   Quantity:  28 tablet   Refills:  0       CULTURELLE DIGESTIVE HEALTH Caps   Used for:  Subacute maxillary sinusitis   Started by:  Tori Cole PA        Dose:  1 tablet   Take 1 tablet by mouth daily   Quantity:  30 capsule   Refills:  0            Where to get your medicines      These medications were sent to [facility] [address] [place] [address]     Phone:  [phone]     amoxicillin-clavulanate 875-125 MG per tablet    CULTURELLE DIGESTIVE HEALTH Caps                Primary Care Provider Office Phone # Fax #    Beto Robertson [phone]       [address] [facility] [address]        Equal Access to Services     ASAD OROZCO " AH: Azam abadleonoraefra Kinza, wajaminda luqadaha, qaybta kalorelei nayvishnu alize quang olgabrennen moraleslatharoz clark. So [facility] [phone].    ATENCIÓN: Si habla español, tiene a membreno disposición servicios gratuitos de asistencia lingüística. Llame al [phone].    We comply with applicable federal civil rights laws and [place] laws. We do not discriminate on the basis of race, color, national origin, age, disability, sex, sexual orientation, or gender identity.            Thank you!     Thank you for choosing [facility] AND hospitals  for your care. Our goal is always to provide you with excellent care. Hearing back from our patients is one way we can continue to improve our services. Please take a few minutes to complete the written survey that you may receive in the mail after your visit with us. Thank you!             Your Updated Medication List - Protect others around you: Learn how to safely use, store and throw away your medicines at www.disposemymeds.org.          This list is accurate as of 5/27/18 10:39 AM.  Always use your most recent med list.                   Brand Name Dispense Instructions for use Diagnosis    amoxicillin-clavulanate 875-125 MG per tablet    AUGMENTIN    28 tablet    Take 1 tablet by mouth 2 times daily for 14 days    Subacute maxillary sinusitis       CULTURELLE DIGESTIVE HEALTH Caps     30 capsule    Take 1 tablet by mouth daily    Subacute maxillary sinusitis

## 2018-07-18 NOTE — ADDENDUM NOTE
Encounter addended by: Darnell Acuna, PT on: 7/18/2018  8:05 AM<BR>     Actions taken: Sign clinical note, Episode resolved

## 2018-07-18 NOTE — PROGRESS NOTES
Outpatient Physical Therapy Discharge Note     Patient: Denton Palacios  : 1960    Beginning/End Dates:  18 to 18    Referring Provider: Dr Jain    Therapy Diagnosis: s/p shoulder surgery, Z98.89     Plan:  Discharge from therapy.    Discharge:   Pt has not returned to physical therapy after a trial of self mgmt techniques indicating that he is doing well with HEP. Please refer to pt's chart for most recent information on objective measurements, goals or any additional information. This is an unplanned DC    Reason for Discharge: Patient has met all goals.  No further expectation of progress.  Patient chooses to discontinue therapy.    Discharge Plan: Patient to continue home program.

## 2018-07-23 NOTE — PROGRESS NOTES
Patient Information     Patient Name  Denton Palacios MRN  [id number] Sex  Male   1960      Letter by Beto Robertson MD at      Author:  Beto Robertson MD Service:  (none) Author Type:  (none)    Filed:   Encounter Date:  2018 Status:  (Other)           Denton Palacios  [address]  [place] [address]          2018    Dear Mr. Palacios:    Enclosed is a copy of her laboratory testing which did come back satisfactory. Please call if he should have any questions.  Results for orders placed or performed in visit on 18       COMP METABOLIC PANEL       Result  Value Ref Range Status    SODIUM 144 (H) 133 - 143 mmol/L Final    POTASSIUM 4.2 3.5 - 5.1 mmol/L Final    CHLORIDE 108 (H) 98 - 107 mmol/L Final    CO2,TOTAL 26 21 - 31 mmol/L Final    ANION GAP 10 5 - 18                 Final    GLUCOSE 111 (H) 70 - 105 mg/dL Final    CALCIUM 9.4 8.6 - 10.3 mg/dL Final    BUN 19 7 - 25 mg/dL Final    CREATININE 1.12 0.70 - 1.30 mg/dL Final    BUN/CREAT RATIO           17                 Final    GFR if African American >60 >60 ml/min/1.73m2 Final    GFR if not African American >60 >60 ml/min/1.73m2 Final    ALBUMIN 4.6 3.5 - 5.7 g/dL Final    PROTEIN,TOTAL 7.2 6.4 - 8.9 g/dL Final    GLOBULIN                  2.6 2.0 - 3.7 g/dL Final    A/G RATIO 1.8 1.0 - 2.0                 Final    BILIRUBIN,TOTAL 0.7 0.3 - 1.0 mg/dL Final    ALK PHOSPHATASE 70 34 - 104 IU/L Final    ALT (SGPT) 28 7 - 52 IU/L Final    AST (SGOT) 18 13 - 39 IU/L Final   CBC WITH AUTO DIFFERENTIAL       Result  Value Ref Range Status    WHITE BLOOD COUNT         8.1 4.5 - 11.0 thou/cu mm Final    RED BLOOD COUNT           5.47 4.30 - 5.90 mil/cu mm Final    HEMOGLOBIN                16.2 13.5 - 17.5 g/dL Final    HEMATOCRIT                47.7 37.0 - 53.0 % Final    MCV                       87 80 - 100 fL Final    MCH                       29.6 26.0 - 34.0 pg Final    MCHC                      34.0 32.0 - 36.0 g/dL  Final    RDW                       13.5 11.5 - 15.5 % Final    PLATELET COUNT            233 140 - 440 thou/cu mm Final    MPV                       10.5 6.5 - 11.0 fL Final    NEUTROPHILS               60.1 42.0 - 72.0 % Final    LYMPHOCYTES               29.1 20.0 - 44.0 % Final    MONOCYTES                 7.3 <12.0 % Final    EOSINOPHILS               2.7 <8.0 % Final    BASOPHILS                 0.6 <3.0 % Final    IMMATURE GRANULOCYTES(METAS,MYELOS,PROS) 0.2 % Final    ABSOLUTE NEUTROPHILS      4.8 1.7 - 7.0 thou/cu mm Final    ABSOLUTE LYMPHOCYTES      2.3 0.9 - 2.9 thou/cu mm Final    ABSOLUTE MONOCYTES        0.6 <0.9 thou/cu mm Final    ABSOLUTE EOSINOPHILS      0.2 <0.5 thou/cu mm Final    ABSOLUTE BASOPHILS        0.1 <0.3 thou/cu mm Final    ABSOLUTE IMMATURE GRANULOCYTES(METAS,MYELOS,PROS) 0.0 <=0.3 thou/cu mm Final     Beto Robertson MD ....................  1/29/2018   1:23 PM

## 2018-07-24 ENCOUNTER — TRANSFERRED RECORDS (OUTPATIENT)
Dept: HEALTH INFORMATION MANAGEMENT | Facility: OTHER | Age: 58
End: 2018-07-24

## 2018-11-06 ENCOUNTER — OFFICE VISIT (OUTPATIENT)
Dept: FAMILY MEDICINE | Facility: OTHER | Age: 58
End: 2018-11-06
Attending: FAMILY MEDICINE
Payer: COMMERCIAL

## 2018-11-06 VITALS
DIASTOLIC BLOOD PRESSURE: 70 MMHG | WEIGHT: 315 LBS | HEART RATE: 51 BPM | TEMPERATURE: 98 F | BODY MASS INDEX: 40.48 KG/M2 | SYSTOLIC BLOOD PRESSURE: 146 MMHG | OXYGEN SATURATION: 95 %

## 2018-11-06 DIAGNOSIS — R01.1 SYSTOLIC MURMUR: ICD-10-CM

## 2018-11-06 DIAGNOSIS — J06.9 VIRAL UPPER RESPIRATORY TRACT INFECTION: Primary | ICD-10-CM

## 2018-11-06 PROCEDURE — G0463 HOSPITAL OUTPT CLINIC VISIT: HCPCS

## 2018-11-06 PROCEDURE — 99213 OFFICE O/P EST LOW 20 MIN: CPT | Performed by: FAMILY MEDICINE

## 2018-11-06 ASSESSMENT — ENCOUNTER SYMPTOMS
CHEST TIGHTNESS: 0
CHOKING: 0
FEVER: 0
SHORTNESS OF BREATH: 1
CHILLS: 0
COUGH: 1

## 2018-11-06 NOTE — NURSING NOTE
Patient presents to clinic today for a possible sinus infection. Ears are plugged, slight sore throat. Nose plugged up when lays down. Productive cough - yellow/green. Facial pain and pressure and some headaches. symptoms for a couple weeks. Patient uses a CPAP and thinks he is getting sick from this.   Michelle Nesbitt CMA..............11/6/2018........10:15 AM    Medication Reconciliation: complete    Michelle Nesbitt CMA

## 2018-11-06 NOTE — PROGRESS NOTES
SUBJECTIVE:   Denton Palacios is a 58 year old male who presents to clinic today for the following health issues:  Ears pluged    HPI Comments: Ear plugs  Like in a tin can  Also reports coughing and occ productive green stuff  Has not tried any meds         Patient Active Problem List    Diagnosis Date Noted     S/P arthroscopy of right shoulder 02/13/2018     Priority: Medium     Complete tear of right rotator cuff 01/22/2018     Priority: Medium     Arthritis of right acromioclavicular joint 01/02/2018     Priority: Medium     Impingement syndrome of right shoulder 01/02/2018     Priority: Medium     Right rotator cuff tendinitis 01/02/2018     Priority: Medium     Olecranon bursitis of right elbow 04/14/2016     Priority: Medium     Sleep apnea 09/11/2014     Priority: Medium     Overview:   On cpap       Quadriceps tendon rupture 02/18/2013     Priority: Medium     Overview:   Initially injured on 2/18/13;   Surgery 2/19/13;  Returned to OR 5/30/13       Past Medical History:   Diagnosis Date     Complete tear of right rotator cuff     1/22/2018     Impingement syndrome of right shoulder     1/2/2018     Olecranon bursitis of right elbow     4/14/2016     Other shoulder lesions, right shoulder     1/2/2018     Other specified postprocedural states     2/8/2018     Pneumonia     2/14/2012     Primary osteoarthritis of right shoulder     1/2/2018     S/P arthroscopy of right shoulder 2/13/2018      Past Surgical History:   Procedure Laterality Date     ARTHROSCOPY SHOULDER Right 02/08/2018     AS REPAIR CRUCIATE LIGAMENT,KNEE Left     [id number],ANTERIOR CRUCIATE LIGAMENT REPAIR,Left,with graft     Current Outpatient Prescriptions   Medication Sig Dispense Refill     Lactobacillus-Inulin ([facility] DIGESTIVE HEALTH) CAPS Take 1 tablet by mouth daily 30 capsule 0     Allergies   Allergen Reactions     Morphine Nausea and Vomiting       Review of Systems   Constitutional: Negative for chills and fever.   HENT:  Positive for congestion.         Pressure in the nose    Respiratory: Positive for cough and shortness of breath. Negative for choking and chest tightness.         Sob with exertion         OBJECTIVE:     /70  Pulse 51  Temp 98  F (36.7  C) (Tympanic)  Wt 317 lb 1.6 oz (143.8 kg)  SpO2 95%  BMI 40.48 kg/m2  Body mass index is 40.48 kg/(m^2).  Physical Exam   Constitutional: He appears well-developed and well-nourished.   HENT:   Head: Normocephalic and atraumatic.   Nose: Nose normal.   Ear drums retracted    Eyes: Pupils are equal, round, and reactive to light.   Cardiovascular:   Murmur heard.  Systolic mumur    Pulmonary/Chest: Effort normal and breath sounds normal.   Musculoskeletal: Normal range of motion.   Neurological: He is alert.       none     ASSESSMENT/PLAN:         1. Viral upper respiratory tract infection  Support  mucinex     2. Systolic murmur  Due to a new murmur   - Echocardiogram Complete; Future      Beto Robertson MD  [facility]

## 2018-11-06 NOTE — MR AVS SNAPSHOT
"              After Visit Summary   11/6/2018    Denton Palacios    MRN: [id number]           Patient Information     Date Of Birth          1960        Visit Information        Provider Department      11/6/2018 10:30 AM Beto Robertson MD [facility]        Today's Diagnoses     Viral upper respiratory tract infection    -  1    Systolic murmur           Follow-ups after your visit        Future tests that were ordered for you today     Open Future Orders        Priority Expected Expires Ordered    Echocardiogram Complete Routine  11/6/2019 11/6/2018            Who to contact     If you have questions or need follow up information about today's clinic visit or your schedule please contact [facility] directly at [phone].  Normal or non-critical lab and imaging results will be communicated to you by 27 bard[employer]art, letter or phone within 4 business days after the clinic has received the results. If you do not hear from us within 7 days, please contact the clinic through 27 bardshart or phone. If you have a critical or abnormal lab result, we will notify you by phone as soon as possible.  Submit refill requests through [employer] or call your pharmacy and they will forward the refill request to us. Please allow 3 business days for your refill to be completed.          Additional Information About Your Visit        MyChart Information     [employer] lets you send messages to your doctor, view your test results, renew your prescriptions, schedule appointments and more. To sign up, go to [email or web address] . Click on \"Log in\" on the left side of the screen, which will take you to the Welcome page. Then click on \"Sign up Now\" on the right side of the page.     You will be asked to enter the access code listed below, as well as some personal information. Please follow the directions to create your username and password.     Your access code is: [email or web address]  Expires: 2/4/2019 11:03 " AM     Your access code will  in 90 days. If you need help or a new code, please call your [facility] clinic or [phone].        Care EveryWhere ID     This is your Care EveryWhere ID. This could be used by other organizations to access your [facility] medical records  [id number]        Your Vitals Were     Pulse Temperature Pulse Oximetry BMI (Body Mass Index)          51 98  F (36.7  C) (Tympanic) 95% 40.48 kg/m2         Blood Pressure from Last 3 Encounters:   18 146/70   18 130/76   05/15/18 130/78    Weight from Last 3 Encounters:   18 317 lb 1.6 oz (143.8 kg)   18 313 lb 6.4 oz (142.2 kg)   18 313 lb 11.2 oz (142.3 kg)               Primary Care Provider Office Phone # Fax #    Beto Robertson [phone]       [address] [facility] [address]        Equal Access to Services     [facility]: Hadii ld ku hadasho Soomaali, waaxda luqadaha, qaybta kaalmada adeegyada, alize shukla . So [facility] [phone].    ATENCIÓN: Si habla español, tiene a membreno disposición servicios gratuitos de asistencia lingüística. Llame al [phone].    We comply with applicable federal civil rights laws and [place] laws. We do not discriminate on the basis of race, color, national origin, age, disability, sex, sexual orientation, or gender identity.            Thank you!     Thank you for choosing [facility] AND [facility] [place]  for your care. Our goal is always to provide you with excellent care. Hearing back from our patients is one way we can continue to improve our services. Please take a few minutes to complete the written survey that you may receive in the mail after your visit with us. Thank you!             Your Updated Medication List - Protect others around you: Learn how to safely use, store and throw away your medicines at www.disposemymeds.org.          This list is accurate as of 18 11:03 AM.  Always use your most  recent med list.                   Brand Name Dispense Instructions for use Diagnosis    CULTURELLE DIGESTIVE HEALTH Caps     30 capsule    Take 1 tablet by mouth daily    Subacute maxillary sinusitis

## 2018-11-12 ENCOUNTER — HOSPITAL ENCOUNTER (OUTPATIENT)
Dept: CARDIOLOGY | Facility: OTHER | Age: 58
Discharge: HOME | End: 2018-11-12
Attending: FAMILY MEDICINE | Admitting: FAMILY MEDICINE
Payer: MEDICARE

## 2018-11-12 DIAGNOSIS — R01.1 SYSTOLIC MURMUR: ICD-10-CM

## 2018-11-12 PROCEDURE — 93306 TTE W/DOPPLER COMPLETE: CPT

## 2018-11-12 PROCEDURE — 93306 TTE W/DOPPLER COMPLETE: CPT | Mod: 26 | Performed by: INTERNAL MEDICINE

## 2018-11-20 ENCOUNTER — TELEPHONE (OUTPATIENT)
Dept: FAMILY MEDICINE | Facility: OTHER | Age: 58
End: 2018-11-20

## 2018-11-21 NOTE — TELEPHONE ENCOUNTER
After patient's name and date of birth verified the below information was given.  Patient stated he received the letter last night.    Michelle Bhatt ---- 11/21/2018 3:13 PM

## 2019-07-10 ENCOUNTER — MEDICAL CORRESPONDENCE (OUTPATIENT)
Dept: HEALTH INFORMATION MANAGEMENT | Facility: OTHER | Age: 59
End: 2019-07-10

## 2020-01-29 ENCOUNTER — OFFICE VISIT (OUTPATIENT)
Dept: FAMILY MEDICINE | Facility: OTHER | Age: 60
End: 2020-01-29
Attending: FAMILY MEDICINE
Payer: COMMERCIAL

## 2020-01-29 VITALS
HEART RATE: 52 BPM | TEMPERATURE: 98.4 F | OXYGEN SATURATION: 98 % | WEIGHT: 275.4 LBS | RESPIRATION RATE: 16 BRPM | SYSTOLIC BLOOD PRESSURE: 140 MMHG | BODY MASS INDEX: 35.34 KG/M2 | DIASTOLIC BLOOD PRESSURE: 70 MMHG | HEIGHT: 74 IN

## 2020-01-29 DIAGNOSIS — Z12.5 SCREENING FOR PROSTATE CANCER: ICD-10-CM

## 2020-01-29 DIAGNOSIS — Z13.1 SCREENING FOR DIABETES MELLITUS: ICD-10-CM

## 2020-01-29 DIAGNOSIS — Z13.220 LIPID SCREENING: ICD-10-CM

## 2020-01-29 DIAGNOSIS — Z00.00 ROUTINE GENERAL MEDICAL EXAMINATION AT A HEALTH CARE FACILITY: Primary | ICD-10-CM

## 2020-01-29 LAB
CHOLEST SERPL-MCNC: 145 MG/DL
GLUCOSE SERPL-MCNC: 110 MG/DL (ref 70–105)
HDLC SERPL-MCNC: 38 MG/DL (ref 23–92)
LDLC SERPL CALC-MCNC: 89 MG/DL
NONHDLC SERPL-MCNC: 107 MG/DL
PSA SERPL-ACNC: 1.58 NG/ML
TRIGL SERPL-MCNC: 88 MG/DL

## 2020-01-29 PROCEDURE — 99396 PREV VISIT EST AGE 40-64: CPT | Performed by: FAMILY MEDICINE

## 2020-01-29 PROCEDURE — 82947 ASSAY GLUCOSE BLOOD QUANT: CPT | Mod: ZL | Performed by: FAMILY MEDICINE

## 2020-01-29 PROCEDURE — 80061 LIPID PANEL: CPT | Mod: ZL | Performed by: FAMILY MEDICINE

## 2020-01-29 PROCEDURE — G0103 PSA SCREENING: HCPCS | Mod: ZL | Performed by: FAMILY MEDICINE

## 2020-01-29 PROCEDURE — 36415 COLL VENOUS BLD VENIPUNCTURE: CPT | Mod: ZL | Performed by: FAMILY MEDICINE

## 2020-01-29 PROCEDURE — 84153 ASSAY OF PSA TOTAL: CPT | Mod: ZL | Performed by: FAMILY MEDICINE

## 2020-01-29 PROCEDURE — G0463 HOSPITAL OUTPT CLINIC VISIT: HCPCS

## 2020-01-29 ASSESSMENT — PATIENT HEALTH QUESTIONNAIRE - PHQ9
SUM OF ALL RESPONSES TO PHQ QUESTIONS 1-9: 0
5. POOR APPETITE OR OVEREATING: NOT AT ALL

## 2020-01-29 ASSESSMENT — MIFFLIN-ST. JEOR: SCORE: 2128.96

## 2020-01-29 ASSESSMENT — ANXIETY QUESTIONNAIRES
5. BEING SO RESTLESS THAT IT IS HARD TO SIT STILL: NOT AT ALL
GAD7 TOTAL SCORE: 0
2. NOT BEING ABLE TO STOP OR CONTROL WORRYING: NOT AT ALL
3. WORRYING TOO MUCH ABOUT DIFFERENT THINGS: NOT AT ALL
7. FEELING AFRAID AS IF SOMETHING AWFUL MIGHT HAPPEN: NOT AT ALL
1. FEELING NERVOUS, ANXIOUS, OR ON EDGE: NOT AT ALL
6. BECOMING EASILY ANNOYED OR IRRITABLE: NOT AT ALL

## 2020-01-29 ASSESSMENT — PAIN SCALES - GENERAL: PAINLEVEL: NO PAIN (0)

## 2020-01-29 NOTE — NURSING NOTE
Patient here for a physical and fasting labs. His last eye exam 2019 and his last ental exam is every 6 months. No concerns today. We discuss TDAP  And he will inquire at the pharmacy for insurance coverage. Medication Reconciliation: complete.    Criselda Christy LPN  1/29/2020 10:06 AM

## 2020-01-29 NOTE — LETTER
February 3, 2020      Denton Palacios  [address] [place] [address]        Dear Denton,     This is all great.    Results for orders placed or performed in visit on 01/29/20   Glucose     Status: Abnormal   Result Value Ref Range    Glucose 110 (H) 70 - 105 mg/dL   PSA Screen GH     Status: None   Result Value Ref Range    PSA Screen 1.580 <3.100 ng/mL   Lipid Panel     Status: None   Result Value Ref Range    Cholesterol 145 <200 mg/dL    Triglycerides 88 <150 mg/dL    HDL Cholesterol 38 23 - 92 mg/dL    LDL Cholesterol Calculated 89 <100 mg/dL    Non HDL Cholesterol 107 <130 mg/dL           Sincerely,        Srinivasa Ambrose MD

## 2020-01-29 NOTE — PROGRESS NOTES
3  SUBJECTIVE:   CC: Denton Palacios is an 59 year old male who presents for preventive health visit.     Healthy Habits:    Do you get at least three servings of calcium containing foods daily (dairy, green leafy vegetables, etc.)? Yes, is on a Keto diet. Started 3-4 months ago. Notes he is feeling well.     Amount of exercise or daily activities, outside of work: 7 day(s) per week, walks dogs a mile a day. Is active outdoors at his house.     Problems taking medications regularly not applicable    Medication side effects: No    Have you had an eye exam in the past two years? yes    Do you see a dentist twice per year? yes    Do you have sleep apnea, excessive snoring or daytime drowsiness?yes, has a CPAP machine. Notes some sinus problems with this.     Has some tinnitus in his right ear. Does have some hearing problems in big crowds. 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     Notes Dr. Robertson diagnosed him with a heart murmur. Had an echo done in 2018 that showed no murmur. Is not happy about all the extra testing and does not want to go back.     Today's PHQ-2 Score:   PHQ-2 ( 1999 Pf[employer]) 11/6/2018 5/27/2018   Q1: Little interest or pleasure in doing things 0 0   Q2: Feeling down, depressed or hopeless 0 0   PHQ-2 Score 0 0       Abuse: Current or Past(Physical, Sexual or Emotional)- No  Do you feel safe in your environment? Yes    Have you ever done Advance Care Planning? (For example, a Health Directive, POLST, or a discussion with a medical provider or your loved ones about your wishes): Has paperwork at home. Will fill it out and bring it in.     Social History     Tobacco Use     Smoking status: Never Smoker     Smokeless tobacco: Never Used   Substance Use Topics     Alcohol use: Yes     Alcohol/week: 0.0 standard drinks     Comment: 6 a year     If you drink alcohol do you typically have >3 drinks per day or >7 drinks per week? No                      Last PSA: No results found for: PSA    Reviewed orders  "with patient. Reviewed health maintenance and updated orders accordingly - Yes      Reviewed and updated as needed this visit by clinical staff  Tobacco  Allergies  Meds  Soc Hx        Reviewed and updated as needed this visit by Provider        Past Medical History:   Diagnosis Date     Complete tear of right rotator cuff     1/22/2018     Impingement syndrome of right shoulder     1/2/2018     Olecranon bursitis of right elbow     4/14/2016     Other shoulder lesions, right shoulder     1/2/2018     Other specified postprocedural states     2/8/2018     Pneumonia     2/14/2012     Primary osteoarthritis of right shoulder     1/2/2018     S/P arthroscopy of right shoulder 2/13/2018        ROS:  CONSTITUTIONAL: NEGATIVE for fever, chills, change in weight  INTEGUMENTARY/SKIN: NEGATIVE for worrisome rashes, moles or lesions  EYES: NEGATIVE for vision changes or irritation  ENT: NEGATIVE for ear, mouth and throat problems  RESP: NEGATIVE for significant cough or SOB  CV: NEGATIVE for chest pain, palpitations or peripheral edema  GI: NEGATIVE for nausea, abdominal pain, heartburn, or change in bowel habits   male: negative for dysuria, hematuria, decreased urinary stream, erectile dysfunction, urethral discharge  MUSCULOSKELETAL: had a triquad rupture in his left knee. [employer] screwed up his knee three times. Aches from the cold. Does have arthritis.   NEURO: NEGATIVE for weakness, dizziness or paresthesias  PSYCHIATRIC: NEGATIVE for changes in mood or affect    OBJECTIVE:   BP (!) 140/70   Pulse 52   Temp 98.4  F (36.9  C)   Resp 16   Ht 1.88 m (6' 2\")   Wt 124.9 kg (275 lb 6.4 oz)   SpO2 98%   BMI 35.36 kg/m    EXAM:  GENERAL: healthy, alert and no distress  EYES: Eyes grossly normal to inspection, PERRL and conjunctivae and sclerae normal  HENT: ear canals and TM's normal, nose and mouth without ulcers or lesions  RESP: lungs clear to auscultation - no rales, rhonchi or wheezes  CV: regular " "rate and rhythm, normal S1 S2, no S3 or S4, no murmur, click or rub, no peripheral edema and peripheral pulses strong  ABDOMEN: soft, nontender, no hepatosplenomegaly, no masses and bowel sounds normal  MS: no gross musculoskeletal defects noted, no edema  SKIN: no suspicious lesions or rashes  NEURO: Normal strength and tone, mentation intact and speech normal  PSYCH: mentation appears normal, affect normal/bright    Diagnostic Test Results:  Labs reviewed in Epic  No results found for this or any previous visit (from the past 24 hour(s)).    ASSESSMENT/PLAN:   (Z00.00) Routine general medical examination at a health care facility  (primary encounter diagnosis)  Comment: Patient would like a PSA test but has no symptoms of nocturia or frequency. Would like to just get a level. Does not want to get a colonoscopy due to risk of perforation. Discussed fit testing but is unsure at this time. Generally healthy with no concerns, just started a Keto diet with his wife and enjoys how it makes him feel.   Plan: follow-up in 1 year or sooner if needed.         COUNSELING:  Reviewed preventive health counseling, as reflected in patient instructions       Regular exercise       Healthy diet/nutrition       Vision screening    Estimated body mass index is 35.36 kg/m  as calculated from the following:    Height as of this encounter: 1.88 m (6' 2\").    Weight as of this encounter: 124.9 kg (275 lb 6.4 oz).    Patient started a Keto diet and has lost 50 pounds from that.      reports that he has never smoked. He has never used smokeless tobacco.      Counseling Resources:  ATP IV Guidelines  Pooled Cohorts Equation Calculator  FRAX Risk Assessment  ICSI Preventive Guidelines  Dietary Guidelines for Americans, 2010  USDA's MyPlate  ASA Prophylaxis  Lung CA Screening    Gabby Smith, MS3, RPAP student   Working under the supervision of Dr. Chivo Ambrose MD    Pt was seen and examined by me as well as Gabby Smith, MS 3, I was present for " the exam portion also.      Srinivasa Ambrose MD  [facility] [place]

## 2020-01-30 ASSESSMENT — ANXIETY QUESTIONNAIRES: GAD7 TOTAL SCORE: 0

## 2020-08-18 ENCOUNTER — MEDICAL CORRESPONDENCE (OUTPATIENT)
Dept: HEALTH INFORMATION MANAGEMENT | Facility: OTHER | Age: 60
End: 2020-08-18

## 2021-06-04 ENCOUNTER — OFFICE VISIT (OUTPATIENT)
Dept: FAMILY MEDICINE | Facility: OTHER | Age: 61
End: 2021-06-04
Attending: NURSE PRACTITIONER
Payer: COMMERCIAL

## 2021-06-04 VITALS
WEIGHT: 291.6 LBS | OXYGEN SATURATION: 95 % | HEART RATE: 57 BPM | TEMPERATURE: 99.3 F | BODY MASS INDEX: 37.42 KG/M2 | RESPIRATION RATE: 18 BRPM | DIASTOLIC BLOOD PRESSURE: 86 MMHG | SYSTOLIC BLOOD PRESSURE: 142 MMHG | HEIGHT: 74 IN

## 2021-06-04 DIAGNOSIS — E66.01 MORBID OBESITY (H): ICD-10-CM

## 2021-06-04 DIAGNOSIS — J01.90 ACUTE BACTERIAL RHINOSINUSITIS: Primary | ICD-10-CM

## 2021-06-04 DIAGNOSIS — B96.89 ACUTE BACTERIAL RHINOSINUSITIS: Primary | ICD-10-CM

## 2021-06-04 PROCEDURE — 99213 OFFICE O/P EST LOW 20 MIN: CPT | Performed by: NURSE PRACTITIONER

## 2021-06-04 PROCEDURE — G0463 HOSPITAL OUTPT CLINIC VISIT: HCPCS

## 2021-06-04 ASSESSMENT — PAIN SCALES - GENERAL: PAINLEVEL: NO PAIN (0)

## 2021-06-04 ASSESSMENT — MIFFLIN-ST. JEOR: SCORE: 2197.44

## 2021-06-04 NOTE — NURSING NOTE
"Chief Complaint   Patient presents with     Sinus Problem     Cough     Sinus issue has been going on and off since february. He has been coughing up mucus sometimes. He is also having a plugged feeling in his ears which he is unsure if it is coming form his sinuses.     Initial There were no vitals taken for this visit. Estimated body mass index is 35.36 kg/m  as calculated from the following:    Height as of 1/29/20: 1.88 m (6' 2\").    Weight as of 1/29/20: 124.9 kg (275 lb 6.4 oz).         Medication Reconciliation: Complete      Harpreet Waldrop LPN   "

## 2021-06-04 NOTE — PATIENT INSTRUCTIONS
I recommend over the counter Flonase for your sinus and ear congestion.     Augmentin sent to pharmacy of choice (antibiotic) take the full 7 day course.     Work on getting a new cpap, in the event this is the culprit of your current infection.

## 2021-06-04 NOTE — PROGRESS NOTES
ASSESSMENT/PLAN:    I have reviewed the nursing notes.  I have reviewed the findings, diagnosis, plan and need for follow up with the patient.    1. Acute bacterial rhinosinusitis  - amoxicillin-clavulanate (AUGMENTIN) 875-125 MG tablet; Take 1 tablet by mouth 2 times daily for 7 days  Dispense: 14 tablet; Refill: 0  -Take full course of antibiotic, increase yogurt intake or probiotic.   -We discussed CPAP use and he is due for a new one. Gave him the number to medical supply in [place] as he was unsuccessful with the number he had for them. He is going to try to get set up with new machine in case that is contributing to this recurrent/ongoing sinus infection symptoms.   -Follow up if symptoms persist or worsen.   -OTC Flonase   -Sinus rinse if desired  -May use over-the-counter Tylenol or ibuprofen PRN    Discussed warning signs/symptoms indicative of need to f/u    Follow up if symptoms persist or worsen or concerns    I explained my diagnostic considerations and recommendations to the patient, who voiced understanding and agreement with the treatment plan. All questions were answered. We discussed potential side effects of any prescribed or recommended therapies, as well as expectations for response to treatments.    Mercy Beltrán NP  6/4/2021  10:25 AM    HPI:  Denton Palacios is a 60 year old male who presents to [facility] today for ongoing intermittent sinus problems including pressure and ears feeling plugged at times as well as a cough x12 weeks, ears feel plugged. Has a cpap machine, same one for 7 years but does clean it as he is supposed to. He questions if this could be a contributing factor. Has occasional productive green sputum. No blood in sputum. No fever/chills, shortness of breath or chest pain, nausea, vomiting, or diarrhea. No recent antibiotics in about 1 year. Has not been seen for this problem since it started 12 weeks ago.     Past Medical History:   Diagnosis Date     Complete tear of  "right rotator cuff     1/22/2018     Impingement syndrome of right shoulder     1/2/2018     Olecranon bursitis of right elbow     4/14/2016     Other shoulder lesions, right shoulder     1/2/2018     Other specified postprocedural states     2/8/2018     Pneumonia     2/14/2012     Primary osteoarthritis of right shoulder     1/2/2018     S/P arthroscopy of right shoulder 2/13/2018     Past Surgical History:   Procedure Laterality Date     ARTHROSCOPY SHOULDER Right 02/08/2018     AS REPAIR CRUCIATE LIGAMENT,KNEE Left     [id number],ANTERIOR CRUCIATE LIGAMENT REPAIR,Left,with graft     Social History     Tobacco Use     Smoking status: Never Smoker     Smokeless tobacco: Never Used   Substance Use Topics     Alcohol use: Yes     Alcohol/week: 0.0 standard drinks     Comment: 6 a year     Current Outpatient Medications   Medication Sig Dispense Refill     amoxicillin-clavulanate (AUGMENTIN) 875-125 MG tablet Take 1 tablet by mouth 2 times daily for 7 days 14 tablet 0     Allergies   Allergen Reactions     Morphine Nausea and Vomiting     Past medical history, past surgical history, current medications and allergies reviewed and accurate to the best of my knowledge.      ROS:  Refer to HPI    BP (!) 142/86   Pulse 57   Temp 99.3  F (37.4  C) (Tympanic)   Resp 18   Ht 1.88 m (6' 2\")   Wt 132.3 kg (291 lb 9.6 oz)   SpO2 95%   BMI 37.44 kg/m      EXAM:  General Appearance: Well appearing 60 year old male, appropriate appearance for age. No acute distress  Ears: Left TM intact, translucent with bony landmarks appreciated, no erythema, no effusion, no bulging, no purulence.  Right TM intact, translucent with bony landmarks appreciated, no erythema, no effusion, no bulging, no purulence.  Left auditory canal clear.  Right auditory canal clear.  Normal external ears, non tender.  Eyes: conjunctivae normal without erythema or irritation, corneas clear, no drainage or crusting, no eyelid swelling, pupils equal "   Orophayrnx: moist mucous membranes, posterior pharynx without erythema, tonsils without hypertrophy, no erythema, no exudates or petechiae, no post nasal drip seen, no trismus, voice clear.    Sinuses: tenderness upon palpation of the maxillary sinuses, feels drainage with pressure of the sinuses. No frontal sinus tenderness.   Nose: Mild erythema, edema, and drainage of bilateral nares  Neck: supple without adenopathy  Respiratory: normal chest wall and respirations.  Normal effort.  Clear to auscultation bilaterally, no wheezing, crackles or rhonchi.  No increased work of breathing.  No cough appreciated.  Cardiac: RRR with no murmurs  Abdomen: soft, nontender, no rigidity, no rebound tenderness or guarding, normal bowel sounds present  :  No suprapubic tenderness to palpation.  No CVA tenderness to palpation.    Musculoskeletal:  Equal movement of bilateral upper extremities.  Equal movement of bilateral lower extremities.  Normal gait.    Psychological: normal affect, alert, oriented, and pleasant.

## 2021-06-07 ENCOUNTER — PATIENT OUTREACH (OUTPATIENT)
Dept: FAMILY MEDICINE | Facility: OTHER | Age: 61
End: 2021-06-07

## 2021-06-07 NOTE — LETTER
[facility]  [address]  [place] [id number] [phone]       June 7, 2021    Denton Palacios  [address]   [place] [address]    Dear Denton,    We care about your health and have reviewed your health plan and are making recommendations based on this review, to optimize your health.    You are in particular need of attention regarding:  -Colon Cancer Screening    We are recommending that you:  -schedule a COLONOSCOPY to look for colon cancer (due every 10 years or 5 years in higher risk situations.)        Colon cancer is now the second leading cause of cancer-related deaths in the [place] for both men and women and there are over 130,000 new cases and 50,000 deaths per year from colon cancer.  Colonoscopies can prevent 90-95% of these deaths.  Problem lesions can be removed before they ever become cancer.  This test is not only looking for cancer, but also getting rid of precancerious lesions.    If you are under/uninsured, we recommend you contact the [employer] program. [employer] is a free colorectal cancer screening program that provides colonoscopies for eligible under/uninsured [place] men and women. If you are interested in receiving a free colonoscopy, please call [employer] at [phone] (mention code [employer]) to see if you re eligible.      If you do not wish to do a colonoscopy or cannot afford to do one, at this time, there is another option. It is called a FIT test or Fecal Immunochemical Occult Blood Test (take home stool sample kit).  It does not replace the colonoscopy for colorectal cancer screening, but it can detect hidden bleeding in the lower colon.  It does need to be repeated every year and if a positive result is obtained, you would be referred for a colonoscopy.          If you have completed either one of these tests at another facility, please call with the details of when and where the tests were done and if they were  normal or not. Or have the records sent to our clinic so that we can best coordinate your care.      In addition, here is a list of due or overdue Health Maintenance reminders.    Health Maintenance Due   Topic Date Due     Discuss Advance Care Planning  Never done     Colorectal Cancer Screening  Never done     HIV Screening  Never done     Hepatitis C Screening  Never done     Diptheria Tetanus Pertussis (DTAP/TDAP/TD) Vaccine (1 - Tdap) 09/10/1984     Zoster (Shingles) Vaccine (1 of 2) Never done     Annual Wellness Visit  01/29/2021       To address the above recommendations, we encourage you to contact us at [phone]. They will assist you with finding the most convenient time and location.    Thank you for trusting [facility] \[facility] [place] [facility]\"" and we appreciate the opportunity to serve you.  We look forward to supporting your healthcare needs in the future.    Healthy Regards,    Your [facility] \[facility] [place] [facility]\"" Team

## 2021-06-14 ENCOUNTER — TRANSFERRED RECORDS (OUTPATIENT)
Dept: HEALTH INFORMATION MANAGEMENT | Facility: OTHER | Age: 61
End: 2021-06-14

## 2021-07-23 ENCOUNTER — OFFICE VISIT (OUTPATIENT)
Dept: FAMILY MEDICINE | Facility: OTHER | Age: 61
End: 2021-07-23
Attending: PHYSICIAN ASSISTANT
Payer: COMMERCIAL

## 2021-07-23 VITALS
DIASTOLIC BLOOD PRESSURE: 78 MMHG | RESPIRATION RATE: 18 BRPM | BODY MASS INDEX: 36.32 KG/M2 | TEMPERATURE: 98.7 F | WEIGHT: 283 LBS | OXYGEN SATURATION: 96 % | SYSTOLIC BLOOD PRESSURE: 126 MMHG | HEIGHT: 74 IN | HEART RATE: 55 BPM

## 2021-07-23 DIAGNOSIS — J01.90 ACUTE SINUSITIS WITH SYMPTOMS > 10 DAYS: Primary | ICD-10-CM

## 2021-07-23 PROCEDURE — 99213 OFFICE O/P EST LOW 20 MIN: CPT | Performed by: PHYSICIAN ASSISTANT

## 2021-07-23 PROCEDURE — G0463 HOSPITAL OUTPT CLINIC VISIT: HCPCS

## 2021-07-23 RX ORDER — DOXYCYCLINE 100 MG/1
100 CAPSULE ORAL 2 TIMES DAILY
Qty: 20 CAPSULE | Refills: 0 | Status: SHIPPED | OUTPATIENT
Start: 2021-07-23 | End: 2021-08-02

## 2021-07-23 ASSESSMENT — PAIN SCALES - GENERAL: PAINLEVEL: NO PAIN (0)

## 2021-07-23 ASSESSMENT — MIFFLIN-ST. JEOR: SCORE: 2158.43

## 2021-07-23 NOTE — PROGRESS NOTES
ASSESSMENT/PLAN:    I have reviewed the nursing notes.  I have reviewed the findings, diagnosis, plan and need for follow up with the patient.    (J01.90) Acute sinusitis with symptoms > 10 days  (primary encounter diagnosis)  Comment: see below  Plan: doxycycline hyclate (VIBRAMYCIN) 100 MG capsule        Vital signs stable. PE consistent with sinusitis. Discussed with patient that we recommend: to dry out congestion with flonase (1spray in both nostrils 2x daily for 3-5 days) and pseudoephedrine (1-2 tabs every 4-6 hrs for 3-5 days) unless contraindicated, use a saline spray/Neti Pot/sinus flush (Raúl Med Sinus Rinse) 2-3 times daily to irrigate sinuses/mucosal tissue. This dilutes and moves secretions. Alternate Tylenol or ibuprofen for pain and fevers - alternate every 4 hours as needed. Recommend plenty of fluids and rest as needed. Discussed that if a smoker, tobacco cessation recommended. Doxycycline for symptoms control as > 10 days, discussed to watch sunlight exposure due to risk of photosensitivity rash, hold daily multivitamin on RX. Discussed that if an antibiotic was prescribed today for bacterial sinusitis to take the entire course of antibiotic, discussed side effect profile of prescribed medications. Patient is in agreement and understanding of the above treatment plan. All questions and concerns were addressed and answered to patient's satisfaction. AVS reviewed with patient.     Discussed warning signs/symptoms indicative of need to f/u    Follow up if symptoms persist or worsen or concerns    I explained my diagnostic considerations and recommendations to the patient, who voiced understanding and agreement with the treatment plan. All questions were answered. We discussed potential side effects of any prescribed or recommended therapies, as well as expectations for response to treatments.    Pastora Guerrero PA-C  7/23/2021  10:43 AM    HPI:    Denton Palacios is a 60 year old male  who presents to  [facility] today for concerns of URI, x > 1 week.     Symptoms:  No fevers or chills.  No sore throat/pharyngitis/tonsillitis.   Yes allergy/URI Symptoms  No Muffled Sounds/Change in Hearing  No Sensation of Fullness in Ear(s)  No Ringing in Ears/Tinnitus  No Balance Changes  No Dizziness  Yes Congestion (head/nasal/chest)  Yes Cough/Productive Cough  Yes Post Nasal Drip - color: clear  No Headache  Yes Sinus Pain/Pressure  No Myalgias  Yes Otalgia  Activity Level Changes: No  Appetite/Liquid Intake Changes: No  Changes to Bowel Habits: No  Changes to Bladder Habits: No  Additional Symptoms to Report: No  History of similar symptoms: No  Prior workup: No    Treatments tried: Tylenol/Ibuprofen and Antihistamine    Site of exposure: not known.  Type of exposure: not known    Cardiopulmonary History:  Recent Infections (Pneumonia, etc): No  Smoker: No  Asthma: No  COPD: No  Additional History: No  Cystic Fibrosis: No  Cardiac History Including Stroke/Stent Placement/STEMI/STEMI, etc.: No    Slight abdominal pain with improvement after eating. He eats a lot of processed meats/cheeses, gluten, fatty foods per patient. He is eating a version of keto diet - he has been on that intermittently for a year and notes worse when falls off meal plan.     Other Pertinent History: sleep apnea - CPAP    PCP: MD Arnol    Past Medical History:   Diagnosis Date     Complete tear of right rotator cuff     1/22/2018     Impingement syndrome of right shoulder     1/2/2018     Olecranon bursitis of right elbow     4/14/2016     Other shoulder lesions, right shoulder     1/2/2018     Other specified postprocedural states     2/8/2018     Pneumonia     2/14/2012     Primary osteoarthritis of right shoulder     1/2/2018     S/P arthroscopy of right shoulder 2/13/2018     Past Surgical History:   Procedure Laterality Date     ARTHROSCOPY SHOULDER Right 02/08/2018     AS REPAIR CRUCIATE LIGAMENT,KNEE Left     [id number],ANTERIOR CRUCIATE LIGAMENT  "REPAIR,Left,with graft     Social History     Tobacco Use     Smoking status: Never Smoker     Smokeless tobacco: Never Used   Substance Use Topics     Alcohol use: Yes     Alcohol/week: 0.0 standard drinks     Comment: 6 a year     No current outpatient medications on file.     Allergies   Allergen Reactions     Morphine Nausea and Vomiting     Past medical history, past surgical history, current medications and allergies reviewed and accurate to the best of my knowledge.      ROS:  Refer to HPI    /78 (BP Location: Right arm, Patient Position: Sitting, Cuff Size: Adult Large)   Pulse 55   Temp 98.7  F (37.1  C) (Tympanic)   Resp 18   Ht 1.88 m (6' 2\")   Wt 128.4 kg (283 lb)   SpO2 96%   BMI 36.34 kg/m      EXAM:  General Appearance: Well appearing 60-year old male, appropriate appearance for age. No acute distress  Ears: Left TM intact, translucent with bony landmarks appreciated, no erythema, no effusion, no bulging, no purulence.  Right TM intact, translucent with bony landmarks appreciated, no erythema, no effusion, no bulging, no purulence.  Left auditory canal clear.  Right auditory canal clear.  Normal external ears, non tender.  Eyes: conjunctivae normal without erythema or irritation, corneas clear, no drainage or crusting, no eyelid swelling, pupils equal   Orophayrnx: moist mucous membranes, posterior pharynx without erythema, tonsils without hypertrophy, no erythema, no exudates or petechiae, moderate post nasal drip, no trismus, voice clear.    Sinuses:  Bilateral maxillary sinus pressure and drainage which is green in nature  Nose:  Bilateral nares: no erythema, no edema, no drainage or congestion   Neck: supple without adenopathy  Respiratory: normal chest wall and respirations.  Normal effort.  Clear to auscultation bilaterally, no wheezing, crackles or rhonchi.  No increased work of breathing.  No cough appreciated.  Cardiac: RRR with no murmurs  Psychological: normal affect, alert, " oriented, and pleasant.     Labs:  None     Xray:  None

## 2021-07-23 NOTE — PATIENT INSTRUCTIONS
Please refer to your AVS for follow up and pain/symptoms management recommendations (I.e.: medications, helpful conservative treatment modalities, appropriate follow up if need to a specialist or family practice, etc.). Please return to urgent care if your symptoms change or worsen.     Discharge instructions:  -If you were prescribed a medication(s), please take this as prescribed/directed  -Monitor your symptoms, if changing/worsening, return to UC/ER or PCP for follow up    Sinus Infection:   1. Dry out congestion with flonase (1spray in both nostrils 2x daily for 3-5 days) and pseudoephedrine (1-2 tabs every 4-6 hrs for 3-5 days) unless contraindicated     2. Use a saline spray/Neti Pot/sinus flush (Raúl Med Sinus Rinse) 2-3 times daily to irrigate sinuses/mucosal tissue. This dilutes and moves secretions.     3. Tylenol or ibuprofen for pain and fevers - alternate every 4 hours as needed. I.e.: Ibuprofen at 8am, Tylenol 12pm, Ibuprofen 4pm    -Daily maximum of Tylenol is 4000mg (recommend staying under 3000mg)   -Daily maximum of Ibuprofen is 1200mg (take no more than six 200mg pills a day)    4. Plenty of fluids and rest as needed.     5. Chew, yawn and speak to help eustachian tubes drain.     * If you are a smoker, try to quit *     - Consider the following over-the-counter products if you are older than 1 year and not pregnant: honey/chestal for cough relief and sambucus/elderberry for viral upper-respiratory symptoms.    -NO multivitamin, monitor sunlight exposure as can lead to a sunlight rash on Doxycyline

## 2021-07-23 NOTE — NURSING NOTE
"Chief Complaint   Patient presents with     Cough     phlegm      Abdominal Pain     on and off x 1 week pain is dull and sometimes he presses on his stomach and it goes away. says goes away after meals     /78 (BP Location: Right arm, Patient Position: Sitting, Cuff Size: Adult Large)   Pulse 55   Temp 98.7  F (37.1  C) (Tympanic)   Resp 18   Ht 1.88 m (6' 2\")   Wt 128.4 kg (283 lb)   SpO2 96%   BMI 36.34 kg/m        Medication Reconciliation: complete    Majo Mckinney, SADAF    "

## 2021-11-01 ENCOUNTER — IMMUNIZATION (OUTPATIENT)
Dept: FAMILY MEDICINE | Facility: OTHER | Age: 61
End: 2021-11-01
Attending: FAMILY MEDICINE
Payer: MEDICARE

## 2021-11-01 PROCEDURE — 91300 PR COVID VAC PFIZER DIL RECON 30 MCG/0.3 ML IM: CPT

## 2021-12-21 ENCOUNTER — OFFICE VISIT (OUTPATIENT)
Dept: FAMILY MEDICINE | Facility: OTHER | Age: 61
End: 2021-12-21
Payer: COMMERCIAL

## 2021-12-21 VITALS
RESPIRATION RATE: 16 BRPM | OXYGEN SATURATION: 96 % | DIASTOLIC BLOOD PRESSURE: 97 MMHG | BODY MASS INDEX: 36.68 KG/M2 | SYSTOLIC BLOOD PRESSURE: 148 MMHG | TEMPERATURE: 98.5 F | WEIGHT: 285.8 LBS | HEIGHT: 74 IN | HEART RATE: 48 BPM

## 2021-12-21 DIAGNOSIS — J32.9 CHRONIC RECURRENT SINUSITIS: Primary | ICD-10-CM

## 2021-12-21 PROCEDURE — 99213 OFFICE O/P EST LOW 20 MIN: CPT | Performed by: PHYSICIAN ASSISTANT

## 2021-12-21 PROCEDURE — G0463 HOSPITAL OUTPT CLINIC VISIT: HCPCS

## 2021-12-21 RX ORDER — DOXYCYCLINE 100 MG/1
100 CAPSULE ORAL 2 TIMES DAILY
Qty: 20 CAPSULE | Refills: 0 | Status: SHIPPED | OUTPATIENT
Start: 2021-12-21 | End: 2021-12-31

## 2021-12-21 ASSESSMENT — PAIN SCALES - GENERAL: PAINLEVEL: NO PAIN (0)

## 2021-12-21 ASSESSMENT — MIFFLIN-ST. JEOR: SCORE: 2166.13

## 2021-12-21 NOTE — PATIENT INSTRUCTIONS
Please refer to your AVS for follow up and pain/symptoms management recommendations (I.e.: medications, helpful conservative treatment modalities, appropriate follow up if need to a specialist or family practice, etc.). Please return to urgent care if your symptoms change or worsen.     Discharge instructions:  -If you were prescribed a medication(s), please take this as prescribed/directed  -Monitor your symptoms, if changing/worsening, return to UC/ER or PCP for follow up    Sinus Infection:   1. Dry out congestion with flonase (1spray in both nostrils 2x daily for 3-5 days) and pseudoephedrine (1-2 tabs every 4-6 hrs for 3-5 days) unless contraindicated     2. Use a saline spray/Neti Pot/sinus flush ([facility] Sinus Rinse) 2-3 times daily to irrigate sinuses/mucosal tissue. This dilutes and moves secretions.     3. Tylenol or ibuprofen for pain and fevers - alternate every 4 hours as needed. I.e.: Ibuprofen at 8am, Tylenol 12pm, Ibuprofen 4pm    -Daily maximum of Tylenol is 4000mg (recommend staying under 3000mg)   -Daily maximum of Ibuprofen is 1200mg (take no more than six 200mg pills a day)    4. Plenty of fluids and rest as needed.     5. Chew, yawn and speak to help eustachian tubes drain.     * If you are a smoker, try to quit *     - Consider the following over-the-counter products if you are older than 1 year and not pregnant: honey/chestal for cough relief and sambucus/elderberry for viral upper-respiratory symptoms.    You were prescribed an antibiotic, please take into consideration the following information:  - Take entire course of antibiotic even if you start to feel better.  - Antibiotics can cause stomach upset including nausea and diarrhea. Read your bottle or ask the pharmacist if antibiotic can be taken with food to help prevent nausea. If you have symptoms of diarrhea you can take an over-the-counter probiotic and/or increase foods with probiotics such as yogurt, Fisher, sauerkraut.  -Use  caution in sunlight as can lead to increased risk of sunburn while on ABX (antibiotics).

## 2021-12-21 NOTE — PROGRESS NOTES
ASSESSMENT/PLAN:    I have reviewed the nursing notes.  I have reviewed the findings, diagnosis, plan and need for follow up with the patient.    1. Chronic recurrent sinusitis  - doxycycline hyclate (VIBRAMYCIN) 100 MG capsule; Take 1 capsule (100 mg) by mouth 2 times daily for 10 days  Dispense: 20 capsule; Refill: 0  - Vital signs stable. PE consistent with sinusitis. Discussed with patient that we recommend: to dry out congestion with flonase (1spray in both nostrils 2x daily for 3-5 days) and pseudoephedrine (1-2 tabs every 4-6 hrs for 3-5 days) unless contraindicated, use a saline spray/Neti Pot/sinus flush ([facility] Sinus Rinse) 2-3 times daily to irrigate sinuses/mucosal tissue. This dilutes and moves secretions. Alternate Tylenol or ibuprofen for pain and fevers - alternate every 4 hours as needed. Recommend plenty of fluids and rest as needed. Discussed that if a smoker, tobacco cessation recommended.  Present for approximately month we will treat with doxycycline, 1 tablet by mouth twice a day for 10 days.  Patient has had an adequate relief with Augmentin in the past, therefore doxycycline prescribed.  Patient is in agreement and understanding of the above treatment plan. All questions and concerns were addressed and answered to patient's satisfaction. AVS reviewed with patient.     Discussed warning signs/symptoms indicative of need to f/u    Follow up if symptoms persist or worsen or concerns    I explained my diagnostic considerations and recommendations to the patient, who voiced understanding and agreement with the treatment plan. All questions were answered. We discussed potential side effects of any prescribed or recommended therapies, as well as expectations for response to treatments.    Pastora Guerrero PA-C  12/21/2021  11:30 AM    HPI:    Denton Palacios is a 61 year old male  who presents to [facility] today for concerns of URI, x 1 month    Symptoms:  No fevers or chills.   No sore  throat/pharyngitis/tonsillitis.   Yes allergy/URI Symptoms  No Muffled Sounds/Change in Hearing  No Sensation of Fullness in Ear(s)  No Ringing in Ears/Tinnitus  No Balance Changes  No Dizziness  Yes Congestion (head/nasal/chest)  No Cough/Productive Cough  Yes Post Nasal Drip - color: mucus  No Headache  Yes Sinus Pain/Pressure  No Myalgias  No Otalgia  Activity Level Changes: No  Appetite/Liquid Intake Changes: No  Changes to Bowel Habits: No  Changes to Bladder Habits: No  Additional Symptoms to Report: No  History of similar symptoms: No  Prior workup: No    Treatments tried: Mucinex    Site of exposure: not known.  Type of exposure: not known  Other Pertinent History: CPAP    PCP: MD Arnol    Past Medical History:   Diagnosis Date     Complete tear of right rotator cuff     1/22/2018     Impingement syndrome of right shoulder     1/2/2018     Olecranon bursitis of right elbow     4/14/2016     Other shoulder lesions, right shoulder     1/2/2018     Other specified postprocedural states     2/8/2018     Pneumonia     2/14/2012     Primary osteoarthritis of right shoulder     1/2/2018     S/P arthroscopy of right shoulder 2/13/2018     Past Surgical History:   Procedure Laterality Date     ARTHROSCOPY SHOULDER Right 02/08/2018     AS REPAIR CRUCIATE LIGAMENT,KNEE Left     [id number],ANTERIOR CRUCIATE LIGAMENT REPAIR,Left,with graft     Social History     Tobacco Use     Smoking status: Never Smoker     Smokeless tobacco: Never Used   Substance Use Topics     Alcohol use: Yes     Alcohol/week: 0.0 standard drinks     Comment: 6 a year     No current outpatient medications on file.     Allergies   Allergen Reactions     Morphine Nausea and Vomiting     Past medical history, past surgical history, current medications and allergies reviewed and accurate to the best of my knowledge.      ROS:  Refer to HPI    BP (!) 148/97 (BP Location: Right arm, Patient Position: Sitting, Cuff Size: Adult Regular)   Pulse (!) 48    "Temp 98.5  F (36.9  C) (Tympanic)   Resp 16   Ht 1.88 m (6' 2\")   Wt 129.6 kg (285 lb 12.8 oz)   SpO2 96%   BMI 36.69 kg/m      EXAM:  General Appearance: Well appearing 61 year old male, appropriate appearance for age. No acute distress   Ears: Left TM intact, translucent with bony landmarks appreciated, no erythema, no effusion, no bulging, no purulence.  Right TM intact, translucent with bony landmarks appreciated, no erythema, no effusion, no bulging, no purulence.  Left auditory canal clear.  Right auditory canal clear.  Normal external ears, non tender.  Eyes: conjunctivae normal without erythema or irritation, corneas clear, no drainage or crusting, no eyelid swelling, pupils equal   Orophayrnx: moist mucous membranes, posterior pharynx without erythema, tonsils 1+, no erythema, no exudates or petechiae, no post nasal drip seen, no trismus, voice clear.    Sinuses: Diffuse sinus pressure and fullness.  Nose:  Bilateral nares: no erythema, no edema, no drainage or congestion   Neck: supple without adenopathy  Respiratory: normal chest wall and respirations.  Normal effort.  Clear to auscultation bilaterally, no wheezing, crackles or rhonchi.  No increased work of breathing.  No cough appreciated.  Cardiac: RRR with no murmurs  Dermatological: no rashes noted of exposed skin  Psychological: normal affect, alert, oriented, and pleasant.     Labs:  None     Xray:  None     "

## 2021-12-21 NOTE — NURSING NOTE
Pt presents to clinic today for recurring sinus infection       FOOD SECURITY SCREENING QUESTIONS:    The next two questions are to help us understand your food security.  If you are feeling you need any assistance in this area, we have resources available to support you today.    Hunger Vital Signs:  Within the past 12 months we worried whether our food would run out before we got money to buy more. Never  Within the past 12 months the food we bought just didn't last and we didn't have money to get more. Never    Medication Reconciliation: complete  Osman Grider LPN,LPN on 12/21/2021 at 11:28 AM

## 2022-01-21 ENCOUNTER — OFFICE VISIT (OUTPATIENT)
Dept: FAMILY MEDICINE | Facility: OTHER | Age: 62
End: 2022-01-21
Attending: NURSE PRACTITIONER
Payer: MEDICARE

## 2022-01-21 VITALS
TEMPERATURE: 98.3 F | BODY MASS INDEX: 37.25 KG/M2 | WEIGHT: 290.1 LBS | OXYGEN SATURATION: 98 % | HEART RATE: 52 BPM | SYSTOLIC BLOOD PRESSURE: 136 MMHG | RESPIRATION RATE: 16 BRPM | DIASTOLIC BLOOD PRESSURE: 86 MMHG

## 2022-01-21 DIAGNOSIS — J06.9 VIRAL URI WITH COUGH: ICD-10-CM

## 2022-01-21 DIAGNOSIS — R05.9 COUGH: Primary | ICD-10-CM

## 2022-01-21 PROCEDURE — 99213 OFFICE O/P EST LOW 20 MIN: CPT | Performed by: NURSE PRACTITIONER

## 2022-01-21 PROCEDURE — U0005 INFEC AGEN DETEC AMPLI PROBE: HCPCS | Mod: ZL | Performed by: NURSE PRACTITIONER

## 2022-01-21 PROCEDURE — G0463 HOSPITAL OUTPT CLINIC VISIT: HCPCS

## 2022-01-21 PROCEDURE — C9803 HOPD COVID-19 SPEC COLLECT: HCPCS | Performed by: NURSE PRACTITIONER

## 2022-01-21 ASSESSMENT — PAIN SCALES - GENERAL: PAINLEVEL: NO PAIN (0)

## 2022-01-21 NOTE — PATIENT INSTRUCTIONS
At this time, I feel strongly that you have a viral upper respiratory infection. Covid test is pending for confirmation of this.     I recommend you try OTC Flonase nasal spray daily to help with constant clear nasal drainage (rhinorrhea) and post nasal drip. I also recommend daily loratadine Claritan, zyrtec, or allegra (whichever you prefer) to help with this sensation of plugged ears/nose.     If your symptoms worsen or do not improve in 1 week; recommend further evaluation. At this time, I provided reassurance your lungs are clear and I have no concerns for pneumonia today. Your vital signs are also normal.     Symptomatic treatment - Encouraged fluids, salt water gargles, honey (only if greater than 1 year in age due to risk of botulism), elevation, humidifier, sinus rinse/netti pot, lozenges, tea, topical vapor rub, popsicles, rest.

## 2022-01-21 NOTE — NURSING NOTE
"Chief Complaint   Patient presents with     Cough     He started to have sinus issues about a week ago. He started to cough up mucus around the same time. He states that it has been staying the same. He also has been having his ears feel like they are plugged he stated that if he holds his nose and blows he can get them to pop.     Initial There were no vitals taken for this visit. Estimated body mass index is 36.69 kg/m  as calculated from the following:    Height as of 12/21/21: 1.88 m (6' 2\").    Weight as of 12/21/21: 129.6 kg (285 lb 12.8 oz).       Medication Reconciliation: Complete      FOOD SECURITY SCREENING QUESTIONS:    The next two questions are to help us understand your food security.  If you are feeling you need any assistance in this area, we have resources available to support you today.    Hunger Vital Signs:  Within the past 12 months we worried whether our food would run out before we got money to buy more. Never  Within the past 12 months the food we bought just didn't last and we didn't have money to get more. Never  Harpreet Waldrop LPN,LPN on 1/21/2022 at 10:51 AM          Harpreet Waldrop LPN   "

## 2022-01-21 NOTE — PROGRESS NOTES
ASSESSMENT/PLAN:    I have reviewed the nursing notes.  I have reviewed the findings, diagnosis, plan and need for follow up with the patient.    1. Cough  - Symptomatic; Yes; 1/14/2022 COVID-19 Virus (Coronavirus) by PCR Nose    2. Viral URI with cough  Discussed with Denton today that his symptoms and physical are consistent with viral upper respiratory infection.  Lungs are clear on examination today and did not feel chest x-ray was warranted and discussed this with patient who verbalized understanding.  No antibiotics are indicated.  Discussed symptoms are not consistent with sinus infection that is bacterial in nature.  COVID test is pending, low suspicion for COVID-19 however it is possible.  Recommend symptomatic treatment at this time and follow-up if condition worsens or does not improve within the next 1 week.   -Symptomatic treatment - Encouraged fluids, salt water gargles, honey (only if greater than 1 year in age due to risk of botulism), elevation, humidifier, sinus rinse/netti pot, lozenges, tea, topical vapor rub, popsicles, rest, etc   -Did recommend oral antihistamines and Flonase nasal steroid for persistent nasal drainage.    Discussed warning signs/symptoms indicative of need to f/u    Follow up if symptoms persist or worsen or concerns    I explained my diagnostic considerations and recommendations to the patient, who voiced understanding and agreement with the treatment plan. All questions were answered. We discussed potential side effects of any prescribed or recommended therapies, as well as expectations for response to treatments.    Mercy Beltrán NP  1/21/2022  11:00 AM    HPI:  Denton Palacios is a 61 year old male who presents to [facility] today for concerns of he started to have sinus issues about a week ago, reports drainage back down the throat. He started to cough up mucus around the same time. He states that it has been staying the same, not worsening. He also has been having his  ears feel like they are plugged he stated that if he holds his nose and blows he can get them to pop. He reports having been treated for sinus infection and bronchitis a few different times since June. In the past week, he denies body aches, fevers, fatigue. No known exposures to covid, influenza. No one else at home is sick. No nausea, vomiting, diarrhea. He was treated with doxycycline x 10 days 1 month ago on December 21st. He said his symptoms went away completely for about 2 week period but now have returned. Reports that this illness does feel more in the chest than previous. 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 Lives with wife; but she just went back to work after being off for extended period.     Past Medical History:   Diagnosis Date     Complete tear of right rotator cuff     1/22/2018     Impingement syndrome of right shoulder     1/2/2018     Olecranon bursitis of right elbow     4/14/2016     Other shoulder lesions, right shoulder     1/2/2018     Other specified postprocedural states     2/8/2018     Pneumonia     2/14/2012     Primary osteoarthritis of right shoulder     1/2/2018     S/P arthroscopy of right shoulder 2/13/2018     Past Surgical History:   Procedure Laterality Date     ARTHROSCOPY SHOULDER Right 02/08/2018     AS REPAIR CRUCIATE LIGAMENT,KNEE Left     [id number],ANTERIOR CRUCIATE LIGAMENT REPAIR,Left,with graft     Social History     Tobacco Use     Smoking status: Never Smoker     Smokeless tobacco: Never Used   Substance Use Topics     Alcohol use: Yes     Alcohol/week: 0.0 standard drinks     Comment: 6 a year     No current outpatient medications on file.     Allergies   Allergen Reactions     Morphine Nausea and Vomiting     Past medical history, past surgical history, current medications and allergies reviewed and accurate to the best of my knowledge.      ROS:  Refer to HPI    /86   Pulse 52   Temp 98.3  F (36.8  C) (Tympanic)   Resp 16   Wt 131.6 kg (290 lb 1.6 oz)    SpO2 98%   BMI 37.25 kg/m      EXAM:  General Appearance: Well appearing 61 year old male, appropriate appearance for age. No acute distress   Ears: Left TM intact, translucent with bony landmarks appreciated, no erythema, no effusion, no bulging, no purulence.  Right TM intact, translucent with bony landmarks appreciated, no erythema, no effusion, no bulging, no purulence.  Left auditory canal clear.  Right auditory canal clear.  Normal external ears, non tender.  Eyes: conjunctivae normal without erythema or irritation, corneas clear, no drainage or crusting, no eyelid swelling, pupils equal   Orophayrnx: moist mucous membranes, posterior pharynx without erythema, tonsils symmetric, no erythema, no exudates or petechiae, + post nasal drip seen, no trismus, voice clear.    Sinuses:  No sinus tenderness upon palpation of the frontal or maxillary sinuses  Nose:  Bilateral nares: + erythema, no edema, + clear rhinorrhea  Neck: supple without adenopathy  Respiratory: normal chest wall and respirations.  Normal effort.  Clear to auscultation bilaterally, no wheezing, crackles or rhonchi.  No increased work of breathing.  No cough appreciated.  Cardiac: RRR with no murmurs  Psychological: normal affect, alert, oriented, and pleasant.

## 2022-01-22 LAB — SARS-COV-2 RNA RESP QL NAA+PROBE: NEGATIVE

## 2022-03-10 ENCOUNTER — OFFICE VISIT (OUTPATIENT)
Dept: FAMILY MEDICINE | Facility: OTHER | Age: 62
End: 2022-03-10
Attending: FAMILY MEDICINE
Payer: COMMERCIAL

## 2022-03-10 VITALS
DIASTOLIC BLOOD PRESSURE: 76 MMHG | SYSTOLIC BLOOD PRESSURE: 132 MMHG | WEIGHT: 285 LBS | TEMPERATURE: 97.6 F | HEART RATE: 59 BPM | HEIGHT: 74 IN | RESPIRATION RATE: 16 BRPM | OXYGEN SATURATION: 97 % | BODY MASS INDEX: 36.57 KG/M2

## 2022-03-10 DIAGNOSIS — Z12.5 SCREENING FOR PROSTATE CANCER: ICD-10-CM

## 2022-03-10 DIAGNOSIS — Z00.00 ROUTINE GENERAL MEDICAL EXAMINATION AT A HEALTH CARE FACILITY: Primary | ICD-10-CM

## 2022-03-10 DIAGNOSIS — Z13.1 SCREENING FOR DIABETES MELLITUS: ICD-10-CM

## 2022-03-10 DIAGNOSIS — Z12.11 COLON CANCER SCREENING: ICD-10-CM

## 2022-03-10 DIAGNOSIS — Z13.220 LIPID SCREENING: ICD-10-CM

## 2022-03-10 LAB
CHOLEST SERPL-MCNC: 150 MG/DL
FASTING STATUS PATIENT QL REPORTED: NORMAL
FASTING STATUS PATIENT QL REPORTED: NORMAL
GLUCOSE BLD-MCNC: 98 MG/DL (ref 70–105)
HDLC SERPL-MCNC: 42 MG/DL (ref 23–92)
LDLC SERPL CALC-MCNC: 90 MG/DL
NONHDLC SERPL-MCNC: 108 MG/DL
PSA SERPL-MCNC: 1.41 UG/L (ref 0–4)
TRIGL SERPL-MCNC: 89 MG/DL

## 2022-03-10 PROCEDURE — 82947 ASSAY GLUCOSE BLOOD QUANT: CPT | Mod: ZL | Performed by: FAMILY MEDICINE

## 2022-03-10 PROCEDURE — 36415 COLL VENOUS BLD VENIPUNCTURE: CPT | Mod: ZL | Performed by: FAMILY MEDICINE

## 2022-03-10 PROCEDURE — G0103 PSA SCREENING: HCPCS | Mod: ZL | Performed by: FAMILY MEDICINE

## 2022-03-10 PROCEDURE — 99396 PREV VISIT EST AGE 40-64: CPT | Performed by: FAMILY MEDICINE

## 2022-03-10 PROCEDURE — G0463 HOSPITAL OUTPT CLINIC VISIT: HCPCS

## 2022-03-10 PROCEDURE — 80061 LIPID PANEL: CPT | Mod: ZL | Performed by: FAMILY MEDICINE

## 2022-03-10 ASSESSMENT — PAIN SCALES - GENERAL: PAINLEVEL: MILD PAIN (3)

## 2022-03-10 ASSESSMENT — ACTIVITIES OF DAILY LIVING (ADL): CURRENT_FUNCTION: NO ASSISTANCE NEEDED

## 2022-03-10 NOTE — NURSING NOTE
"Chief Complaint   Patient presents with     Physical     check up       Initial /76   Pulse 59   Temp 97.6  F (36.4  C)   Resp 16   Ht 1.867 m (6' 1.5\")   Wt 129.3 kg (285 lb)   SpO2 97%   BMI 37.09 kg/m   Estimated body mass index is 37.09 kg/m  as calculated from the following:    Height as of this encounter: 1.867 m (6' 1.5\").    Weight as of this encounter: 129.3 kg (285 lb).  Medication Reconciliation: complete.  FOOD SECURITY SCREENING QUESTIONS  Hunger Vital Signs:  Within the past 12 months we worried whether our food would run out before we got money to buy more. Never  Within the past 12 months the food we bought just didn't last and we didn't have money to get more. Never  Amber Timmons LPN 3/10/2022 11:24 AM      Amber Timmons LPN  "

## 2022-03-10 NOTE — PROGRESS NOTES
"SUBJECTIVE:   Denton Palacios is a 61 year old male who presents for Preventive Visit.      Patient has been advised of split billing requirements and indicates understanding: No  Are you in the first 12 months of your Medicare coverage?  Yes,  Visual Acuity:  Right Eye: 20   Left Eye: 20  Both Eyes: 20    Healthy Habits:     In general, how would you rate your overall health?  Good    Frequency of exercise:  2-3 days/week    Duration of exercise:  15-30 minutes    Do you usually eat at least 4 servings of fruit and vegetables a day, include whole grains    & fiber and avoid regularly eating high fat or \"junk\" foods?  Yes    Taking medications regularly:  Yes    Medication side effects:  None    Ability to successfully perform activities of daily living:  No assistance needed    Home Safety:  No safety concerns identified    Hearing Impairment:  Difficulty following a conversation in a noisy restaurant or crowded room, need to ask people to speak up or repeat themselves and find that men's voices are easier to understand than woman's    In the past 6 months, have you been bothered by leaking of urine?  No    In general, how would you rate your overall mental or emotional health?  Good      PHQ-2 Total Score: 0    Additional concerns today:  No    Do you feel safe in your environment? Yes    Have you ever done Advance Care Planning? (For example, a Health Directive, POLST, or a discussion with a medical provider or your loved ones about your wishes): Yes, advance care planning is on file.       Fall risk     click delete button to remove this line now  Cognitive Screening   1) Repeat 3 items (Leader, Season, Table)    2) Clock draw: NORMAL  3) 3 item recall: Recalls 3 objects  Results: NORMAL clock, 1-2 items recalled: COGNITIVE IMPAIRMENT LESS LIKELY    Mini-CogTM Copyright [employer]. Licensed by the author for use in [facility] [employer]; reprinted with permission (abhi@.[facility]). All rights reserved.      Do you " "have sleep apnea, excessive snoring or daytime drowsiness?: yes    Reviewed and updated as needed this visit by clinical staff   Tobacco  Allergies  Meds   Med Hx    Soc Hx        Reviewed and updated as needed this visit by Provider                 Social History     Tobacco Use     Smoking status: Never Smoker     Smokeless tobacco: Never Used   Substance Use Topics     Alcohol use: Yes     Alcohol/week: 0.0 standard drinks     Comment: 6 a year     If you drink alcohol do you typically have >3 drinks per day or >7 drinks per week? No    Alcohol Use 3/10/2022   Prescreen: >3 drinks/day or >7 drinks/week? No   Prescreen: >3 drinks/day or >7 drinks/week? -               Current providers sharing in care for this patient include:   Patient Care Team:  Srinivasa Ambrose MD as PCP - General (Family Practice)  Mercy Beltrán NP as Assigned PCP    The following health maintenance items are reviewed in Epic and correct as of today:  Health Maintenance Due   Topic Date Due     COLORECTAL CANCER SCREENING  Never done     HIV SCREENING  Never done     HEPATITIS C SCREENING  Never done               Review of Systems rhinorrhea for over a year, with post nasal drip.  Has not used any meds.  Wonders if it is his CPAP machine.  Some cough from this too.  Periodic epigastric pain, feels from the PND.      Constitutional, HEENT, cardiovascular, pulmonary, GI, , musculoskeletal, neuro, skin, endocrine and psych systems are negative, except as otherwise noted.    OBJECTIVE:   /76   Pulse 59   Temp 97.6  F (36.4  C)   Resp 16   Ht 1.867 m (6' 1.5\")   Wt 129.3 kg (285 lb)   SpO2 97%   BMI 37.09 kg/m   Estimated body mass index is 37.09 kg/m  as calculated from the following:    Height as of this encounter: 1.867 m (6' 1.5\").    Weight as of this encounter: 129.3 kg (285 lb).  Physical Exam  GENERAL: healthy, alert and no distress  EYES: Eyes grossly normal to inspection, PERRL and conjunctivae and sclerae " normal  HENT: ear canals and TM's normal, nose and mouth without ulcers or lesions  NECK: no adenopathy, no asymmetry, masses, or scars and thyroid normal to palpation  RESP: lungs clear to auscultation - no rales, rhonchi or wheezes  CV: regular rate and rhythm, normal S1 S2, no S3 or S4, no murmur, click or rub, no peripheral edema and peripheral pulses strong  ABDOMEN: soft, nontender, no hepatosplenomegaly, no masses and bowel sounds normal  MS: no gross musculoskeletal defects noted, no edema  SKIN: no suspicious lesions or rashes  NEURO: Normal strength and tone, mentation intact and speech normal  PSYCH: mentation appears normal, affect normal/bright    Diagnostic Test Results:  Labs reviewed in Epic  Results for orders placed or performed in visit on 03/10/22   GLUCOSE     Status: None   Result Value Ref Range    Glucose 98 70 - 105 mg/dL    Patient Fasting > 8hrs? Unknown    Lipid Panel     Status: None   Result Value Ref Range    Cholesterol 150 <200 mg/dL    Triglycerides 89 <150 mg/dL    Direct Measure HDL 42 23 - 92 mg/dL    LDL Cholesterol Calculated 90 <=100 mg/dL    Non HDL Cholesterol 108 <130 mg/dL    Patient Fasting > 8hrs? Unknown     Narrative    Cholesterol  Desirable:  <200 mg/dL    Triglycerides  Normal:  Less than 150 mg/dL  Borderline High:  150-199 mg/dL  High:  200-499 mg/dL  Very High:  Greater than or equal to 500 mg/dL    Direct Measure HDL  Female:  Greater than or equal to 50 mg/dL   Male:  Greater than or equal to 40 mg/dL    LDL Cholesterol  Desirable:  <100mg/dL  Above Desirable:  100-129 mg/dL   Borderline High:  130-159 mg/dL   High:  160-189 mg/dL   Very High:  >= 190 mg/dL    Non HDL Cholesterol  Desirable:  130 mg/dL  Above Desirable:  130-159 mg/dL  Borderline High:  160-189 mg/dL  High:  190-219 mg/dL  Very High:  Greater than or equal to 220 mg/dL   PSA Screen GH     Status: Normal   Result Value Ref Range    Prostate Specific Antigen Screen 1.41 0.00 - 4.00 ug/L     "Narrative    The DXI Access PSAS WHO assay is a two site immunoenzymatic   assay. Assay values obtained with different assay methods cannot be used   interchangeably due to differences in assay methods and reagent specificity.         ASSESSMENT / PLAN:       ICD-10-CM    1. Routine general medical examination at a health care facility  Z00.00    2. Screening for diabetes mellitus  Z13.1 GLUCOSE   3. Lipid screening  Z13.220 Lipid Panel   4. Screening for prostate cancer  Z12.5 PSA Screen GH   5. Colon cancer screening  Z12.11 COLOGUARD(EXACT SCIENCES)     For the PND symptoms, will next have him use flonase. I suspect he has some allergic rhinitis.          COUNSELING:  Reviewed preventive health counseling, as reflected in patient instructions       Regular exercise       Healthy diet/nutrition       Colon cancer screening       Prostate cancer screening    Estimated body mass index is 37.09 kg/m  as calculated from the following:    Height as of this encounter: 1.867 m (6' 1.5\").    Weight as of this encounter: 129.3 kg (285 lb).    Weight management plan: Discussed healthy diet and exercise guidelines    He reports that he has never smoked. He has never used smokeless tobacco.      Appropriate preventive services were discussed with this patient, including applicable screening as appropriate for cardiovascular disease, diabetes, osteopenia/osteoporosis, and glaucoma.  As appropriate for age/gender, discussed screening for colorectal cancer, prostate cancer, breast cancer, and cervical cancer. Checklist reviewing preventive services available has been given to the patient.    Reviewed patients plan of care and provided an AVS. The Basic Care Plan (routine screening as documented in Health Maintenance) for Denton meets the Care Plan requirement. This Care Plan has been established and reviewed with the Patient.    Counseling Resources:  ATP IV Guidelines  Pooled Cohorts Equation Calculator  Breast Cancer Risk " Calculator  Breast Cancer: Medication to Reduce Risk  FRAX Risk Assessment  ICSI Preventive Guidelines  Dietary Guidelines for Americans, 2010  [employer] MyPlate  ASA Prophylaxis  Lung CA Screening    Srinivasa Ambrose MD  [facility]    Identified Health Risks:

## 2022-03-16 LAB — COLOGUARD-ABSTRACT: NEGATIVE

## 2022-04-05 ENCOUNTER — OFFICE VISIT (OUTPATIENT)
Dept: FAMILY MEDICINE | Facility: OTHER | Age: 62
End: 2022-04-05
Attending: NURSE PRACTITIONER
Payer: COMMERCIAL

## 2022-04-05 VITALS
BODY MASS INDEX: 37.88 KG/M2 | DIASTOLIC BLOOD PRESSURE: 96 MMHG | SYSTOLIC BLOOD PRESSURE: 154 MMHG | TEMPERATURE: 97.6 F | OXYGEN SATURATION: 96 % | RESPIRATION RATE: 18 BRPM | HEART RATE: 48 BPM | HEIGHT: 74 IN | WEIGHT: 295.2 LBS

## 2022-04-05 DIAGNOSIS — L82.1 SEBORRHEIC KERATOSES: Primary | ICD-10-CM

## 2022-04-05 PROCEDURE — G0463 HOSPITAL OUTPT CLINIC VISIT: HCPCS

## 2022-04-05 PROCEDURE — 99213 OFFICE O/P EST LOW 20 MIN: CPT | Performed by: NURSE PRACTITIONER

## 2022-04-05 ASSESSMENT — ENCOUNTER SYMPTOMS
FEVER: 0
COLOR CHANGE: 0
WHEEZING: 0
UNEXPECTED WEIGHT CHANGE: 0
COUGH: 1
CHILLS: 0
STRIDOR: 0

## 2022-04-05 ASSESSMENT — PAIN SCALES - GENERAL: PAINLEVEL: NO PAIN (0)

## 2022-04-05 NOTE — PROGRESS NOTES
"  Assessment & Plan   Problem List Items Addressed This Visit     None      Visit Diagnoses     Seborrheic keratoses    -  Primary         Patient presented today for moles on chest and back that he wanted evaluated. Moles in appearance to be Seborrheic Keratoses. Discussed what they are and when to follow up if changes occur. Discussed different treatment options, which were deferred at this time.     Will follow up if new or worsening symptoms occur.     TIMI Al  I was present with the student who participated in the service and in the documentation of this note.  I have verified the history and personally performed a physical exam and medical decision making, and have verified the content of the note, which accurately reflects my assessment of the patient and the plan of care.    NAYAN [facility] [place]    Wesley Chawla is a 61 year old who presents for evaluation of moles. Has moles on his chest and back. Mole on chest was evaluated 7 years ago without concern. Wife wanted moles on back checked out. Unsure if they have grown. \"Brown and flaky\" in appearance. Do not itch or bleed. 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 No history of skin cancer. Has not tried to self treat.     No other concerns at this time.     History of Present Illness       Reason for visit:  Spot on back  Symptom onset:  Today    He eats 2-3 servings of fruits and vegetables daily.        Skin Lesion  Onset/Duration: wife noticed 2 days ago  Description  Location: back  Color: brown  Border description: evenly pigmented, raised  Character: round, flakey  Itching: no  Bleeding:  no  Intensity:  mild  Progression of Symptoms:  same and constant  Accompanying signs and symptoms:   Bleeding: no  Scaling: YES  Excessive sun exposure/tanning: YES  Sunscreen used: no  History:           Any previous history of skin cancer: no  Any family history of melanoma: no  Previous " "episodes of similar lesion: YES  Precipitating or alleviating factors: N/A  Therapies tried and outcome: none    Review of Systems   Constitutional: Negative for chills, fever and unexpected weight change.   Respiratory: Positive for cough. Negative for wheezing and stridor.    Cardiovascular: Negative for chest pain.   Skin: Positive for rash. Negative for color change.         Objective    BP (!) 154/96   Pulse (!) 48   Temp 97.6  F (36.4  C)   Resp 18   Ht 1.867 m (6' 1.5\")   Wt 133.9 kg (295 lb 3.2 oz)   SpO2 96%   BMI 38.42 kg/m    Body mass index is 38.42 kg/m .  Physical Exam   GENERAL: healthy, alert and no distress  RESP: lungs clear to auscultation - no rales, rhonchi or wheezes  CV: regular rate and rhythm, normal S1 S2, no S3 or S4, no murmur, click or rub, no peripheral edema and peripheral pulses strong  SKIN: Raise, brown, even boarder, stuck on appearing lesion on left chest about 2x2cm. Raised brown, even boarder and color stuck on appearing lesion to right shoulder about 8own5hv. Flat, brown, even boarder, lesions on left shoulder about [id number]. Significant sun spotting over shoulders. Cherry hemangiomas present on chest and back.   PSYCH: mentation appears normal, affect normal/bright            "

## 2022-04-05 NOTE — NURSING NOTE
Patient presents today for concerning lesions on his chest and back.    Medication Reconciliation Complete    Christin Isaacs LPN  4/5/2022 8:01 AM

## 2022-04-25 ENCOUNTER — OFFICE VISIT (OUTPATIENT)
Dept: FAMILY MEDICINE | Facility: OTHER | Age: 62
End: 2022-04-25
Attending: PHYSICIAN ASSISTANT
Payer: MEDICARE

## 2022-04-25 ENCOUNTER — HOSPITAL ENCOUNTER (OUTPATIENT)
Dept: GENERAL RADIOLOGY | Facility: OTHER | Age: 62
Discharge: HOME | End: 2022-04-25
Attending: NURSE PRACTITIONER
Payer: MEDICARE

## 2022-04-25 VITALS
OXYGEN SATURATION: 96 % | SYSTOLIC BLOOD PRESSURE: 138 MMHG | BODY MASS INDEX: 38.68 KG/M2 | TEMPERATURE: 97.9 F | WEIGHT: 297.2 LBS | RESPIRATION RATE: 16 BRPM | HEART RATE: 53 BPM | DIASTOLIC BLOOD PRESSURE: 80 MMHG

## 2022-04-25 DIAGNOSIS — R06.02 SHORTNESS OF BREATH: ICD-10-CM

## 2022-04-25 DIAGNOSIS — J22 LOWER RESPIRATORY INFECTION: ICD-10-CM

## 2022-04-25 DIAGNOSIS — R05.9 COUGH: Primary | ICD-10-CM

## 2022-04-25 PROCEDURE — 99213 OFFICE O/P EST LOW 20 MIN: CPT | Performed by: NURSE PRACTITIONER

## 2022-04-25 PROCEDURE — 71046 X-RAY EXAM CHEST 2 VIEWS: CPT

## 2022-04-25 PROCEDURE — G0463 HOSPITAL OUTPT CLINIC VISIT: HCPCS | Mod: 25

## 2022-04-25 PROCEDURE — G0463 HOSPITAL OUTPT CLINIC VISIT: HCPCS

## 2022-04-25 RX ORDER — AZITHROMYCIN 250 MG/1
TABLET, FILM COATED ORAL
Qty: 6 TABLET | Refills: 0 | Status: SHIPPED | OUTPATIENT
Start: 2022-04-25 | End: 2022-04-30

## 2022-04-25 ASSESSMENT — PAIN SCALES - GENERAL: PAINLEVEL: NO PAIN (0)

## 2022-04-25 NOTE — PROGRESS NOTES
ASSESSMENT/PLAN:  I have reviewed the nursing notes.  I have reviewed the findings, diagnosis, plan and need for follow up with the patient.    1. Cough  2. Shortness of breath  - XR Chest 2 Views  Clear read; reviewed in office.     3. Lower respiratory infection  -offered covid test today which he declined; will take home test. Based on ongoing symptoms and today being day 8 with worsening symptoms of productive cough and sinues ; opted to treat with azithromycin.   - azithromycin (ZITHROMAX) 250 MG tablet; Take 2 tablets (500 mg) by mouth daily for 1 day, THEN 1 tablet (250 mg) daily for 4 days.  Dispense: 6 tablet; Refill: 0    Follow up if symptoms persist or worsen or concerns    I explained my diagnostic considerations and recommendations to the patient, who voiced understanding and agreement with the treatment plan. All questions were answered. We discussed potential side effects of any prescribed or recommended therapies, as well as expectations for response to treatments.    Mercy Beltrán NP  4/25/2022  2:31 PM    HPI:  Denton Palacios is a 61 year old male who presents to [facility] today for concerns of cough, sinus issues (pressure and congestion) that started on the 19th or so. He took a home covid test on the 17th proactively before an event that was negative - this was prior to his onset of symptoms. No covid tests since getting sick. He declines a covid test today. Will take home test. No fevers/chills. Denies fatigue, body aches. He reports cough is productive. Does not feel specifically short of breath .    Past Medical History:   Diagnosis Date     Complete tear of right rotator cuff     1/22/2018     Impingement syndrome of right shoulder     1/2/2018     Olecranon bursitis of right elbow     4/14/2016     Other shoulder lesions, right shoulder     1/2/2018     Other specified postprocedural states     2/8/2018     Pneumonia     2/14/2012     Primary osteoarthritis of right shoulder      1/2/2018     S/P arthroscopy of right shoulder 2/13/2018     Past Surgical History:   Procedure Laterality Date     ARTHROSCOPY SHOULDER Right 02/08/2018     AS REPAIR CRUCIATE LIGAMENT,KNEE Left     [id number],ANTERIOR CRUCIATE LIGAMENT REPAIR,Left,with graft     Social History     Tobacco Use     Smoking status: Never Smoker     Smokeless tobacco: Never Used   Substance Use Topics     Alcohol use: Yes     Alcohol/week: 0.0 standard drinks     Comment: 6 a year     Current Outpatient Medications   Medication Sig Dispense Refill     azithromycin (ZITHROMAX) 250 MG tablet Take 2 tablets (500 mg) by mouth daily for 1 day, THEN 1 tablet (250 mg) daily for 4 days. 6 tablet 0     Allergies   Allergen Reactions     Morphine Nausea and Vomiting     Past medical history, past surgical history, current medications and allergies reviewed and accurate to the best of my knowledge.      ROS:  Refer to HPI    /80 (BP Location: Right arm, Patient Position: Sitting, Cuff Size: Adult Large)   Pulse 53   Temp 97.9  F (36.6  C) (Tympanic)   Resp 16   Wt 134.8 kg (297 lb 3.2 oz)   SpO2 96%   BMI 38.68 kg/m      EXAM:  General Appearance: Well appearing 61 year old male, appropriate appearance for age. No acute distress   Ears: Left TM intact, translucent with bony landmarks appreciated, no erythema, no effusion, no bulging, no purulence.  Right TM intact, translucent with bony landmarks appreciated, no erythema, no effusion, no bulging, no purulence.  Left auditory canal clear.  Right auditory canal clear.  Normal external ears, non tender.  Eyes: conjunctivae normal without erythema or irritation, corneas clear, no drainage or crusting, no eyelid swelling, pupils equal   Oropharynx: moist mucous membranes, posterior pharynx without erythema, tonsils symmetric, no erythema, no exudates or petechiae, no post nasal drip seen, voice clear.    Sinuses:  + sinus tenderness upon palpation of the maxillary sinuses   Nose:  Bilateral  nares: no erythema, no edema, + nasal congestion   Neck: supple without adenopathy  Respiratory: normal chest wall and respirations.  Normal effort.  Clear to auscultation bilaterally, no wheezing, crackles or rhonchi.  No increased work of breathing.  + harsh productive cough appreciated.  Cardiac: RRR with no murmurs  Psychological: normal affect, alert, oriented, and pleasant.     Results for orders placed or performed in visit on 04/25/22   XR Chest 2 Views     Status: None    Narrative    XR CHEST 2 VW    HISTORY: 61 years Male cough x 1 week, not improving. pneumonia?;  Cough; Shortness of breath    COMPARISON: 12/11/2017    TECHNIQUE: 2 views of the chest were obtained.    FINDINGS: Two views of the chest were obtained. Heart size and  pulmonary vascularity are within normal limits, lungs are clear on  both views. No consolidating air space opacities are present.          Impression    IMPRESSION: Clear chest.    VERNON TABARES MD         SYSTEM ID:  [id number]

## 2022-04-25 NOTE — PATIENT INSTRUCTIONS
Zpak prescribed for lower respiratory infection and sinuses.     Chest xray was clear; but your symptoms are worsening with worsening productive cough.     Recommend home covid test as you declined office test.

## 2022-04-25 NOTE — NURSING NOTE
"Chief Complaint   Patient presents with     Cough, Sinus Issues       Initial /80 (BP Location: Right arm, Patient Position: Sitting, Cuff Size: Adult Large)   Pulse 53   Temp 97.9  F (36.6  C) (Tympanic)   Resp 16   Wt 134.8 kg (297 lb 3.2 oz)   SpO2 96%   BMI 38.68 kg/m   Estimated body mass index is 38.68 kg/m  as calculated from the following:    Height as of 4/5/22: 1.867 m (6' 1.5\").    Weight as of this encounter: 134.8 kg (297 lb 3.2 oz).     Medication Reconciliation: complete      FOOD SECURITY SCREENING QUESTIONS:    The next two questions are to help us understand your food security.  If you are feeling you need any assistance in this area, we have resources available to support you today.    Hunger Vital Signs:  Within the past 12 months we worried whether our food would run out before we got money to buy more. Never  Within the past 12 months the food we bought just didn't last and we didn't have money to get more. Never    Advance care plan reviewed      Carlie Herron LPN on 4/25/2022 at 2:26 PM      "

## 2022-09-13 ENCOUNTER — DOCUMENTATION ONLY (OUTPATIENT)
Dept: FAMILY MEDICINE | Facility: OTHER | Age: 62
End: 2022-09-13

## 2022-09-13 DIAGNOSIS — G47.33 OSA (OBSTRUCTIVE SLEEP APNEA): Primary | ICD-10-CM

## 2023-02-02 ENCOUNTER — OFFICE VISIT (OUTPATIENT)
Dept: FAMILY MEDICINE | Facility: OTHER | Age: 63
End: 2023-02-02
Attending: NURSE PRACTITIONER
Payer: COMMERCIAL

## 2023-02-02 VITALS
HEART RATE: 50 BPM | BODY MASS INDEX: 37.05 KG/M2 | WEIGHT: 284.7 LBS | OXYGEN SATURATION: 98 % | DIASTOLIC BLOOD PRESSURE: 80 MMHG | TEMPERATURE: 98.2 F | RESPIRATION RATE: 20 BRPM | SYSTOLIC BLOOD PRESSURE: 150 MMHG

## 2023-02-02 DIAGNOSIS — R42 VERTIGO: Primary | ICD-10-CM

## 2023-02-02 DIAGNOSIS — I51.7 VENTRICULAR HYPERTROPHY: ICD-10-CM

## 2023-02-02 LAB
ALBUMIN SERPL BCG-MCNC: 4.5 G/DL (ref 3.5–5.2)
ALP SERPL-CCNC: 80 U/L (ref 40–129)
ALT SERPL W P-5'-P-CCNC: 28 U/L (ref 10–50)
ANION GAP SERPL CALCULATED.3IONS-SCNC: 9 MMOL/L (ref 7–15)
AST SERPL W P-5'-P-CCNC: 20 U/L (ref 10–50)
ATRIAL RATE - MUSE: 49 BPM
BASOPHILS # BLD AUTO: 0 10E3/UL (ref 0–0.2)
BASOPHILS NFR BLD AUTO: 0 %
BILIRUB SERPL-MCNC: 0.8 MG/DL
BUN SERPL-MCNC: 19.2 MG/DL (ref 8–23)
CALCIUM SERPL-MCNC: 9.6 MG/DL (ref 8.8–10.2)
CHLORIDE SERPL-SCNC: 107 MMOL/L (ref 98–107)
CREAT SERPL-MCNC: 1.05 MG/DL (ref 0.67–1.17)
DEPRECATED HCO3 PLAS-SCNC: 29 MMOL/L (ref 22–29)
DIASTOLIC BLOOD PRESSURE - MUSE: NORMAL MMHG
EOSINOPHIL # BLD AUTO: 0.1 10E3/UL (ref 0–0.7)
EOSINOPHIL NFR BLD AUTO: 1 %
ERYTHROCYTE [DISTWIDTH] IN BLOOD BY AUTOMATED COUNT: 14.3 % (ref 10–15)
GFR SERPL CREATININE-BSD FRML MDRD: 80 ML/MIN/1.73M2
GLUCOSE SERPL-MCNC: 110 MG/DL (ref 70–99)
HCT VFR BLD AUTO: 47.7 % (ref 40–53)
HGB BLD-MCNC: 16 G/DL (ref 13.3–17.7)
IMM GRANULOCYTES # BLD: 0 10E3/UL
IMM GRANULOCYTES NFR BLD: 0 %
INTERPRETATION ECG - MUSE: NORMAL
LYMPHOCYTES # BLD AUTO: 2.5 10E3/UL (ref 0.8–5.3)
LYMPHOCYTES NFR BLD AUTO: 27 %
MAGNESIUM SERPL-MCNC: 2 MG/DL (ref 1.7–2.3)
MCH RBC QN AUTO: 29.5 PG (ref 26.5–33)
MCHC RBC AUTO-ENTMCNC: 33.5 G/DL (ref 31.5–36.5)
MCV RBC AUTO: 88 FL (ref 78–100)
MONOCYTES # BLD AUTO: 0.6 10E3/UL (ref 0–1.3)
MONOCYTES NFR BLD AUTO: 7 %
NEUTROPHILS # BLD AUTO: 6.1 10E3/UL (ref 1.6–8.3)
NEUTROPHILS NFR BLD AUTO: 65 %
NRBC # BLD AUTO: 0 10E3/UL
NRBC BLD AUTO-RTO: 0 /100
P AXIS - MUSE: 24 DEGREES
PLATELET # BLD AUTO: 251 10E3/UL (ref 150–450)
POTASSIUM SERPL-SCNC: 4.3 MMOL/L (ref 3.4–5.3)
PR INTERVAL - MUSE: 132 MS
PROT SERPL-MCNC: 6.8 G/DL (ref 6.4–8.3)
QRS DURATION - MUSE: 118 MS
QT - MUSE: 488 MS
QTC - MUSE: 440 MS
R AXIS - MUSE: -52 DEGREES
RBC # BLD AUTO: 5.42 10E6/UL (ref 4.4–5.9)
SODIUM SERPL-SCNC: 145 MMOL/L (ref 136–145)
SYSTOLIC BLOOD PRESSURE - MUSE: NORMAL MMHG
T AXIS - MUSE: -14 DEGREES
VENTRICULAR RATE- MUSE: 49 BPM
VIT B12 SERPL-MCNC: 820 PG/ML (ref 232–1245)
WBC # BLD AUTO: 9.4 10E3/UL (ref 4–11)

## 2023-02-02 PROCEDURE — 85025 COMPLETE CBC W/AUTO DIFF WBC: CPT | Mod: ZL

## 2023-02-02 PROCEDURE — 80053 COMPREHEN METABOLIC PANEL: CPT | Mod: ZL

## 2023-02-02 PROCEDURE — G0463 HOSPITAL OUTPT CLINIC VISIT: HCPCS

## 2023-02-02 PROCEDURE — 93005 ELECTROCARDIOGRAM TRACING: CPT

## 2023-02-02 PROCEDURE — 83735 ASSAY OF MAGNESIUM: CPT | Mod: ZL

## 2023-02-02 PROCEDURE — 82607 VITAMIN B-12: CPT | Mod: ZL

## 2023-02-02 PROCEDURE — 36415 COLL VENOUS BLD VENIPUNCTURE: CPT | Mod: ZL

## 2023-02-02 PROCEDURE — 93010 ELECTROCARDIOGRAM REPORT: CPT | Performed by: INTERNAL MEDICINE

## 2023-02-02 PROCEDURE — 99214 OFFICE O/P EST MOD 30 MIN: CPT

## 2023-02-02 RX ORDER — OLMESARTAN MEDOXOMIL AND HYDROCHLOROTHIAZIDE 20/12.5 20; 12.5 MG/1; MG/1
1 TABLET ORAL DAILY
Qty: 40 TABLET | Refills: 0 | Status: SHIPPED | OUTPATIENT
Start: 2023-02-02 | End: 2024-03-25

## 2023-02-02 ASSESSMENT — PAIN SCALES - GENERAL: PAINLEVEL: NO PAIN (0)

## 2023-02-02 ASSESSMENT — ENCOUNTER SYMPTOMS
SPEECH DIFFICULTY: 0
APPETITE CHANGE: 0
DIZZINESS: 1
VOMITING: 0
SORE THROAT: 0
SHORTNESS OF BREATH: 0
COUGH: 0
SINUS PAIN: 0
BACK PAIN: 0
NAUSEA: 0
WEAKNESS: 0
DIARRHEA: 0
PHOTOPHOBIA: 1
HEADACHES: 0
ACTIVITY CHANGE: 0
ABDOMINAL PAIN: 0
SINUS PRESSURE: 0
FEVER: 0

## 2023-02-02 NOTE — NURSING NOTE
"Pt presents to  for vertigo and vomiting. Pt woke up yesterday at 0600 with symptoms. Pt took Meclizine last night at 2130 and it helped with stomach ache.      Chief Complaint   Patient presents with     Vertigo     Vomiting       FOOD SECURITY SCREENING QUESTIONS  Hunger Vital Signs:  Within the past 12 months we worried whether our food would run out before we got money to buy more. Never  Within the past 12 months the food we bought just didn't last and we didn't have money to get more. Never  Stacie Dearmon 2/2/2023 9:42 AM      Initial BP (!) 150/80 (BP Location: Right arm, Patient Position: Sitting, Cuff Size: Adult Large)   Pulse 50   Temp 98.2  F (36.8  C) (Tympanic)   Resp 20   Wt 129.1 kg (284 lb 11.2 oz)   SpO2 98%   BMI 37.05 kg/m   Estimated body mass index is 37.05 kg/m  as calculated from the following:    Height as of 4/5/22: 1.867 m (6' 1.5\").    Weight as of this encounter: 129.1 kg (284 lb 11.2 oz).  Medication Reconciliation: complete    Stacie Dearmon    "

## 2023-02-02 NOTE — PROGRESS NOTES
Assessment & Plan   Denton Palacios is a 62 year old presenting for the following health issues:      ICD-10-CM    1. Vertigo  R42 EKG 12-lead, tracing only     CBC and Differential     Comprehensive Metabolic Panel     Magnesium     Vitamin B12     Physical Therapy Referral     PRIMARY CARE FOLLOW-UP SCHEDULING      2. Ventricular hypertrophy  I51.7 olmesartan-hydrochlorothiazide (BENICAR HCT) 20-12.5 MG tablet     PRIMARY CARE FOLLOW-UP SCHEDULING     Echocardiogram Complete        Discussed with patient possible etiologies of his recent episode of vertigo.  It is possible that patient may have Ménière's disease as he states he has chronic tinnitus, chronic postnasal drainage, physical examination shows increased fluid in right ear without signs of infection.  It is also possible that patient has BPPV as he has positive for nystagmus on the right, positive for vertigo with movement of head and with positional changes.  Instructed him to try maneuvers at home for BPPV, also placed physical therapy referral if he is unable to resolve symptoms at home.    Additionally, patient's heart rate is chronically bradycardic in the 40s and 50s.  EKG shows ventricular hypertrophy with increased QRS widening in comparison to EKG performed 3 years ago.  I suspect that his ventricular hypertrophy has worsened over the years being that he has uncontrolled hypertension.  Ordered echo to be updated as this has been last done 3 years ago.  Also discussed possible differential of cardiac amyloidosis as many of his symptoms correlate to this.  He denies having any associated heart palpitations or chest pain.    Prescribed Benicar HCT for hypertension.  If he does have Ménière's disease, this also would help remove excess fluid buildup that could be causing this. Instructed him to follow-up with PCP in 4 weeks for blood pressure recheck and to follow-up on vertigo.    CBC, CMP, magnesium, vitamin B12 was checked today and all came back  normal.        Return in 4 weeks (on 3/2/2023), or if symptoms worsen or fail to improve, for Follow up.    NAYAN Clarke CNP  [facility]      Subjective   Denton is a 62 year old, presenting for the following health issues:  Vertigo and Vomiting      HPI     When patient woke up in the morning, he took his c-pap off and stood up and felt like the room was spinning- became nauseated and vomited. Since then he had about 10 small episodes of vomiting yesterday.  His dizziness was worse upon position changes and when turning his head.  He states that it improved last night. This morning he felt slightly dizzy when he got out of bed. Currently dizziness has improved- but feels slightly dizzy when turning his head side to side and with positional changes, denies any syncopal episodes.        Review of Systems   Constitutional: Negative for activity change, appetite change and fever.   HENT: Positive for postnasal drip and tinnitus. Negative for ear discharge, ear pain, sinus pressure, sinus pain and sore throat. Trouble swallowing:  chronic      Eyes: Positive for photophobia.   Respiratory: Negative for cough and shortness of breath.    Gastrointestinal: Negative for abdominal pain, diarrhea, nausea and vomiting.   Musculoskeletal: Negative for back pain and gait problem.   Neurological: Positive for dizziness. Negative for speech difficulty, weakness and headaches.   All other systems reviewed and are negative.           Objective    BP (!) 150/80 (BP Location: Right arm, Patient Position: Sitting, Cuff Size: Adult Large)   Pulse 50   Temp 98.2  F (36.8  C) (Tympanic)   Resp 20   Wt 129.1 kg (284 lb 11.2 oz)   SpO2 98%   BMI 37.05 kg/m    Body mass index is 37.05 kg/m .  Physical Exam  Vitals reviewed.   Constitutional:       General: He is not in acute distress.     Appearance: He is not toxic-appearing.   HENT:      Head: Normocephalic and atraumatic.      Jaw: No trismus.      Right  Ear: There is no impacted cerumen.      Left Ear: Tympanic membrane, ear canal and external ear normal. There is no impacted cerumen.      Ears:      Comments: Increased serous fluid in right ear  Eyes:      Extraocular Movements: Extraocular movements intact.      Right eye: Nystagmus present. Normal extraocular motion.      Left eye: Normal extraocular motion and no nystagmus.   Cardiovascular:      Rate and Rhythm: Regular rhythm. Bradycardia present.      Pulses: Normal pulses.      Heart sounds: Murmur (systolic ) heard.   Pulmonary:      Effort: Pulmonary effort is normal.      Breath sounds: Normal breath sounds. No wheezing.   Musculoskeletal:         General: No swelling or deformity. Normal range of motion.      Cervical back: Normal range of motion.   Skin:     General: Skin is warm and dry.   Neurological:      General: No focal deficit present.      Mental Status: He is alert and oriented to person, place, and time. Mental status is at baseline.      Cranial Nerves: No cranial nerve deficit.      Sensory: No sensory deficit.      Motor: No weakness.      Coordination: Coordination normal.      Gait: Gait normal.   Psychiatric:         Mood and Affect: Mood normal.         Behavior: Behavior normal.         Thought Content: Thought content normal.         Judgment: Judgment normal.        Results for orders placed or performed in visit on 02/02/23   Comprehensive Metabolic Panel     Status: Abnormal   Result Value Ref Range    Sodium 145 136 - 145 mmol/L    Potassium 4.3 3.4 - 5.3 mmol/L    Chloride 107 98 - 107 mmol/L    Carbon Dioxide (CO2) 29 22 - 29 mmol/L    Anion Gap 9 7 - 15 mmol/L    Urea Nitrogen 19.2 8.0 - 23.0 mg/dL    Creatinine 1.05 0.67 - 1.17 mg/dL    Calcium 9.6 8.8 - 10.2 mg/dL    Glucose 110 (H) 70 - 99 mg/dL    Alkaline Phosphatase 80 40 - 129 U/L    AST 20 10 - 50 U/L    ALT 28 10 - 50 U/L    Protein Total 6.8 6.4 - 8.3 g/dL    Albumin 4.5 3.5 - 5.2 g/dL    Bilirubin Total 0.8 <=1.2  mg/dL    GFR Estimate 80 >60 mL/min/1.73m2   Magnesium     Status: Normal   Result Value Ref Range    Magnesium 2.0 1.7 - 2.3 mg/dL   Vitamin B12     Status: Normal   Result Value Ref Range    Vitamin B12 820 232 - 1,245 pg/mL   CBC with platelets and differential     Status: None   Result Value Ref Range    WBC Count 9.4 4.0 - 11.0 10e3/uL    RBC Count 5.42 4.40 - 5.90 10e6/uL    Hemoglobin 16.0 13.3 - 17.7 g/dL    Hematocrit 47.7 40.0 - 53.0 %    MCV 88 78 - 100 fL    MCH 29.5 26.5 - 33.0 pg    MCHC 33.5 31.5 - 36.5 g/dL    RDW 14.3 10.0 - 15.0 %    Platelet Count 251 150 - 450 10e3/uL    % Neutrophils 65 %    % Lymphocytes 27 %    % Monocytes 7 %    % Eosinophils 1 %    % Basophils 0 %    % Immature Granulocytes 0 %    NRBCs per 100 WBC 0 <1 /100    Absolute Neutrophils 6.1 1.6 - 8.3 10e3/uL    Absolute Lymphocytes 2.5 0.8 - 5.3 10e3/uL    Absolute Monocytes 0.6 0.0 - 1.3 10e3/uL    Absolute Eosinophils 0.1 0.0 - 0.7 10e3/uL    Absolute Basophils 0.0 0.0 - 0.2 10e3/uL    Absolute Immature Granulocytes 0.0 <=0.4 10e3/uL    Absolute NRBCs 0.0 10e3/uL   EKG 12-lead, tracing only     Status: None (Preliminary result)   Result Value Ref Range    Systolic Blood Pressure  mmHg    Diastolic Blood Pressure  mmHg    Ventricular Rate 49 BPM    Atrial Rate 49 BPM    AL Interval 132 ms    QRS Duration 118 ms     ms    QTc 440 ms    P Axis 24 degrees    R AXIS -52 degrees    T Axis -14 degrees    Interpretation ECG       Sinus bradycardia  Left anterior fascicular block  Left ventricular hypertrophy with QRS widening  Junctional ST depression, probably abnormal  Abnormal ECG  No previous ECGs available     CBC and Differential     Status: None    Narrative    The following orders were created for panel order CBC and Differential.  Procedure                               Abnormality         Status                     ---------                               -----------         ------                     CBC with platelets  and mukul.[[id number]]                      Final result                 Please view results for these tests on the individual orders.

## 2023-02-02 NOTE — PATIENT INSTRUCTIONS
Start olmesartan once a day in the morning    Try maneuvers on YouTube to treat potential BPPV    Update ECHO- this has been ordered  - they will call with date/time of appointment.      Referral placed for physical therapy if needed for BPPV if you are unable to solve dizziness on your own      Follow up in clinic in 4 weeks           Cardiac amyloidosis

## 2023-02-03 ENCOUNTER — HOSPITAL ENCOUNTER (OUTPATIENT)
Dept: PHYSICAL THERAPY | Facility: OTHER | Age: 63
Setting detail: THERAPIES SERIES
Discharge: HOME | End: 2023-02-03
Payer: MEDICARE

## 2023-02-03 DIAGNOSIS — R42 VERTIGO: ICD-10-CM

## 2023-02-03 PROCEDURE — 97112 NEUROMUSCULAR REEDUCATION: CPT | Mod: GP,PN | Performed by: PHYSICAL THERAPIST

## 2023-02-03 PROCEDURE — 97163 PT EVAL HIGH COMPLEX 45 MIN: CPT | Mod: GP,PN | Performed by: PHYSICAL THERAPIST

## 2023-02-03 NOTE — PROGRESS NOTES
[facility]                                                                                   OUTPATIENT PHYSICAL THERAPY FUNCTIONAL EVALUATION  PLAN OF TREATMENT FOR OUTPATIENT REHABILITATION  (COMPLETE FOR INITIAL CLAIMS ONLY)  Patient's Last Name, First Name, M.I.  YOB: 1960  Denton Palacios     Provider's Name   [facility]   Medical Record No.  [id number]     Start of Care Date:  02/03/23   Onset Date:   2/1/23   Type:     _X__PT   ____OT  ____SLP Medical Diagnosis:  vertigo     PT Diagnosis:  impaired mobility Visits from SOC:  1                              __________________________________________________________________________________  Plan of Treatment/Functional Goals:  neuromuscular re-education (vestibular therapy)           GOALS  head motion  Patient will be able to rotate head right/left with no limitation due to dizziness to allow safe interaction with environment including driving.  03/31/23    bed mobility  Patient will be able to roll Right/Left with no limitation due to dizziness to allow safe bed mobility.  03/31/23    transfers  Patient will be able to complete sit-supine transfers with no limitation due to dizziness for decreased risk of falls.  03/31/23    IADLs  Patient will be able to complete all household tasks like loading/unloading  with no limitation due to dizziness.  03/31/23               Therapy Frequency:   (8 visits)   Predicted Duration of Therapy Intervention:  8 weeks    FABIANA GAO PT                                    I CERTIFY THE NEED FOR THESE SERVICES FURNISHED UNDER        THIS PLAN OF TREATMENT AND WHILE UNDER MY CARE     (Physician co-signature of this document indicates review and certification of the therapy plan).              Certification Date From:  02/03/23   Certification Date To:   03/31/23    Referring Provider:  NAYAN Galan, CNP    Initial Assessment  See Epic Evaluation- Start of Care Date: 02/03/23

## 2023-02-03 NOTE — PROGRESS NOTES
02/03/23 0900   Quick Adds   Quick Adds Certification;Vestibular Eval   Type of Visit Initial OP PT Evaluation   General Information   Start of Care Date 02/03/23   Referring Physician NAYAN Galan, CNP   Orders Evaluate and Treat as Indicated   Order Date 02/02/23   Medical Diagnosis Vertigo   Precautions/Limitations no known precautions/limitations   Surgical/Medical history reviewed Yes   Pertinent history of current vestibular problem (include personal factors and/or comorbidities that impact the POC)  Migraines  (blood pressue, neck pain)   Pertinent history of current problem (include personal factors and/or comorbidities that impact the POC) Dizziness started Wednesday.  EMR reviewed.  Tried doing some home exercises for BPPV.  Not sure it helped.  No acute changes in hearing or vision.  Does have some chronic sinus issues/drainage since last summer with treatment due to pneumonia.  Yesterday was given a diuretic to try to clear that up.  Was observed to have a low heart rate and plans to get a med for that.  Follow up on 2/8/23 for an echocardiogram.   Patient role/Employment history Retired   [facility]   Home/Community Accessibility Comments No limitations, just more cautious with transfers   Fall Risk Screen   Fall screen completed by PT   Have you fallen 2 or more times in the past year? No   Have you fallen and had an injury in the past year? No   Is patient a fall risk? No   Oculomotor Exam   Rapid Head Thrust Corrective Saccade L head thrust   Infrared Goggle Exam or Frenzel Lense Exam   Vestibular Suppressant in Last 24 Hours? Yes   Exam completed with Frenzel Lenses   Spontaneous Nystagmus Negative   Gaze Evoked Nystagmus Other  (Possible slow nystagmus R or possibly following floaters per patient report.)   Fior-Hallpike (right) Horizontal L   Fior-Hallpike (right) comments ageotropic nystagmus, greater than 1 min duration   Fior-Hallpike (Left) Horizontal R    Fior-Hallpike (left) comments ageotropic nystagmus, greater than 1 min, more intense   HSCC Supine Roll Test (Right) Horizontal L   HSCC Supine Roll Test (Left) Horizontal R   BPPV Canal(s) L Horizontal   BPPV Type Cupulolithasis   Planned Therapy Interventions   Planned Therapy Interventions neuromuscular re-education  (vestibular therapy)   Clinical Impression   Criteria for Skilled Therapeutic Interventions Met yes, treatment indicated   PT Diagnosis impaired mobility   Influenced by the following impairments dizziness, unsteadiness   Functional limitations due to impairments head motion, transfers, bed mobility, household tasks   Clinical Presentation Unstable/Unpredictable   Clinical Presentation Rationale clinical observation   Clinical Decision Making (Complexity) High complexity   Therapy Frequency   (8 visits)   Predicted Duration of Therapy Intervention (days/wks) 8 weeks   Risk & Benefits of therapy have been explained Yes   Patient, Family & other staff in agreement with plan of care Yes   Clinical Impression Comments Patient presents with left horizontal canal cupulolithiasis.  Would benefit from PT for correction maneuvers and progression of balance activity as needed to allow return to prior level of function.   GOALS   PT Eval Goals 1;2;3;4   Goal 1   Goal Identifier head motion   Goal Description Patient will be able to rotate head right/left with no limitation due to dizziness to allow safe interaction with environment including driving.   Target Date 03/31/23   Goal 2   Goal Identifier bed mobility   Goal Description Patient will be able to roll Right/Left with no limitation due to dizziness to allow safe bed mobility.   Target Date 03/31/23   Goal 3   Goal Identifier transfers   Goal Description Patient will be able to complete sit-supine transfers with no limitation due to dizziness for decreased risk of falls.   Target Date 03/31/23   Goal 4   Goal Identifier IADLs   Goal Description Patient  will be able to complete all household tasks like loading/unloading  with no limitation due to dizziness.   Target Date 03/31/23   Total Evaluation Time   PT Eval, High Complexity Minutes (11651) 30   Therapy Certification   Certification date from 02/03/23   Certification date to 03/31/23   Medical Diagnosis vertigo

## 2023-02-08 ENCOUNTER — HOSPITAL ENCOUNTER (OUTPATIENT)
Dept: CARDIOLOGY | Facility: OTHER | Age: 63
Discharge: HOME | End: 2023-02-08
Payer: MEDICARE

## 2023-02-08 ENCOUNTER — HOSPITAL ENCOUNTER (OUTPATIENT)
Dept: PHYSICAL THERAPY | Facility: OTHER | Age: 63
Setting detail: THERAPIES SERIES
Discharge: HOME | End: 2023-02-08
Payer: MEDICARE

## 2023-02-08 LAB — LVEF ECHO: NORMAL

## 2023-02-08 PROCEDURE — 93306 TTE W/DOPPLER COMPLETE: CPT

## 2023-02-08 PROCEDURE — 97112 NEUROMUSCULAR REEDUCATION: CPT | Mod: GP,PN | Performed by: PHYSICAL THERAPIST

## 2023-02-08 PROCEDURE — 93306 TTE W/DOPPLER COMPLETE: CPT | Mod: 26 | Performed by: INTERNAL MEDICINE

## 2023-02-08 NOTE — PROGRESS NOTES
[facility]    Outpatient Physical Therapy Discharge Note    Patient: Denton Palacios  : 1960    Beginning/End Dates of Reporting Period:  2/3/23 to 23    Referring Provider: NAYAN Galan, CNP    Therapy Diagnosis: vertigo           Client Self Report: Did HEP about 5 times for 3 days.  Thinks he has resolved dizziness due to crystals.  Started a medication called Olmesartan (started last Friday) which causes some fogginess.  Prescription for 30 days.  Will continue to work with MD and NP for medical management.    Objective Measurements:  Roll Test Left and Right = both negative           Goals:  Goal Identifier head motion   Goal Description Patient will be able to rotate head right/left with no limitation due to dizziness to allow safe interaction with environment including driving.   Target Date 23   Date Met   23   Progress (detail required for progress note):  Goal met     Goal Identifier bed mobility   Goal Description Patient will be able to roll Right/Left with no limitation due to dizziness to allow safe bed mobility.   Target Date 23   Date Met   23   Progress (detail required for progress note):  Goal met     Goal Identifier transfers   Goal Description Patient will be able to complete sit-supine transfers with no limitation due to dizziness for decreased risk of falls.   Target Date 23   Date Met   23   Progress (detail required for progress note):  Goal met     Goal Identifier IADLs   Goal Description Patient will be able to complete all household tasks like loading/unloading  with no limitation due to dizziness.   Target Date 23   Date Met   23   Progress (detail required for progress note):  Goal met         Plan:  Discharge from therapy.  Patient's vestibular symptoms have resolved and all positional testing is negative.  Ok to  discontinue HEP at this time.    Reason for Discharge: Patient has met all goals.    Equipment Issued: none    Discharge Plan: Continue to follow up with physician regarding heart treatment and medical management.  Encouraged patient to call with any questions or concerns.

## 2023-02-24 ENCOUNTER — VIRTUAL VISIT (OUTPATIENT)
Dept: FAMILY MEDICINE | Facility: OTHER | Age: 63
End: 2023-02-24
Attending: PHYSICIAN ASSISTANT
Payer: COMMERCIAL

## 2023-02-24 DIAGNOSIS — U07.1 INFECTION DUE TO 2019 NOVEL CORONAVIRUS: Primary | ICD-10-CM

## 2023-02-24 DIAGNOSIS — I51.7 VENTRICULAR HYPERTROPHY: ICD-10-CM

## 2023-02-24 DIAGNOSIS — R03.0 ELEVATED BLOOD PRESSURE READING WITHOUT DIAGNOSIS OF HYPERTENSION: ICD-10-CM

## 2023-02-24 PROCEDURE — 99212 OFFICE O/P EST SF 10 MIN: CPT | Mod: 95 | Performed by: PHYSICIAN ASSISTANT

## 2023-02-24 NOTE — PROGRESS NOTES
Denton is a 62 year old who is being evaluated via a billable telephone visit.      What phone number would you like to be contacted at? [phone]  How would you like to obtain your AVS? Mail a copy  Distant Location (provider location):  On-site    Assessment & Plan     1. Infection due to 2019 novel coronavirus  COVID-positive, symptoms began yesterday.  Due to past medical history and age qualifies for antiviral therapy as prescribed below.  Okay to continue using over-the-counter cough or cold medications.  Follow-up as needed.  - nirmatrelvir and ritonavir (PAXLOVID) therapy pack; Take 3 tablets by mouth 2 times daily for 5 days (Take 2 Nirmatrelvir tablets and 1 Ritonavir tablet twice daily for 5 days)  Dispense: 30 tablet; Refill: 0    2. Elevated blood pressure reading without diagnosis of hypertension  3. Ventricular hypertrophy  Blood pressure recently noted to be mildly elevated at 150/80 at clinic visit earlier this month.  Does have history of presumed ventricular hypertrophy noted on EKGs.  Follow-up echocardiogram showing mild left ventricular hypertrophy, not affecting ejection fraction, heart function.  Patient was started on combination blood pressure medication and he is wondering if he will need to continue on this.  Does not have a blood pressure monitor at home, order will be faxed for him to closely monitor over the next 2 to 3 weeks.  If consistently above goal of less than 130/80 would recommend staying on the medication.  Does have follow-up with PCP scheduled for March.  Follow-up in clinic as needed in the meantime.  - Home Blood Pressure Monitor Order for DME - ONLY FOR DME        No follow-ups on file.    Paris Johnson PA-C  [facility]    Subjective   Denton is a 62 year old, presenting for the following health issues:  Covid Concern      HPI   COVID-19 Symptom Review  How many days ago did these symptoms start? 2/23/2023    Are any of the following symptoms  significant for you?    New or worsening difficulty breathing? No    Worsening cough? Yes, it's a dry cough.     Fever or chills? No    Headache: No    Sore throat: YES    Chest pain: No    Diarrhea: No    Body aches? No    What treatments has patient tried? Acetaminophen   Does patient live in a nursing home, group home, or shelter? No  Does patient have a way to get food/medications during quarantined? Yes, I have a friend or family member who can help me.    Contacted via telephone for discussion regarding recent COVID infection.  Patient developed symptoms including sore throat, otalgia, dry cough yesterday.  Tested positive at home yesterday.  Wife is sick with similar symptoms.  Would like to consider antiviral therapy.  Also has questions regarding recent blood pressure.  Patient was evaluated in clinic on 2/2/2023 where he was started on olmesartan-hydrochlorothiazide due to elevated blood pressure 150 and history of ventricular hypertrophy on EKG.  Subsequent echocardiogram completed showing mild left ventricular wall thickening, not affecting ejection fraction or heart function.  Patient did not have a history of significant blood pressure prior.  He is hoping to be able to monitor blood pressures at home to determine if medication is needed.  He has follow-up with PCP scheduled for end of March.      Review of Systems   Per HPI        Objective           Vitals:  No vitals were obtained today due to virtual visit.    Physical Exam   healthy, alert and no distress  PSYCH: Alert and oriented times 3; coherent speech, normal   rate and volume, able to articulate logical thoughts, able   to abstract reason, no tangential thoughts, no hallucinations   or delusions  His affect is normal  RESP: No cough, no audible wheezing, able to talk in full sentences  Remainder of exam unable to be completed due to telephone visits            Phone call duration: 9 minutes

## 2023-02-24 NOTE — NURSING NOTE
Patient is requesting Antiviral for COVID.  Jen Caruso LPN ....................  2/24/2023   8:09 AM

## 2023-03-24 ENCOUNTER — OFFICE VISIT (OUTPATIENT)
Dept: FAMILY MEDICINE | Facility: OTHER | Age: 63
End: 2023-03-24
Attending: PHYSICIAN ASSISTANT
Payer: COMMERCIAL

## 2023-03-24 VITALS
BODY MASS INDEX: 37.22 KG/M2 | DIASTOLIC BLOOD PRESSURE: 82 MMHG | TEMPERATURE: 97.2 F | RESPIRATION RATE: 16 BRPM | HEIGHT: 74 IN | WEIGHT: 290 LBS | OXYGEN SATURATION: 98 % | HEART RATE: 48 BPM | SYSTOLIC BLOOD PRESSURE: 148 MMHG

## 2023-03-24 DIAGNOSIS — Z12.5 SCREENING FOR PROSTATE CANCER: ICD-10-CM

## 2023-03-24 DIAGNOSIS — I10 ESSENTIAL HYPERTENSION: ICD-10-CM

## 2023-03-24 DIAGNOSIS — Z00.00 ENCOUNTER FOR MEDICARE ANNUAL WELLNESS EXAM: Primary | ICD-10-CM

## 2023-03-24 DIAGNOSIS — Z13.220 LIPID SCREENING: ICD-10-CM

## 2023-03-24 LAB
CHOLEST SERPL-MCNC: 163 MG/DL
HDLC SERPL-MCNC: 46 MG/DL
LDLC SERPL CALC-MCNC: 102 MG/DL
NONHDLC SERPL-MCNC: 117 MG/DL
PSA SERPL DL<=0.01 NG/ML-MCNC: 1.77 NG/ML (ref 0–4.5)
TRIGL SERPL-MCNC: 77 MG/DL

## 2023-03-24 PROCEDURE — G0103 PSA SCREENING: HCPCS | Mod: ZL | Performed by: FAMILY MEDICINE

## 2023-03-24 PROCEDURE — G0439 PPPS, SUBSEQ VISIT: HCPCS | Performed by: FAMILY MEDICINE

## 2023-03-24 PROCEDURE — G0463 HOSPITAL OUTPT CLINIC VISIT: HCPCS

## 2023-03-24 PROCEDURE — 36415 COLL VENOUS BLD VENIPUNCTURE: CPT | Mod: ZL | Performed by: FAMILY MEDICINE

## 2023-03-24 PROCEDURE — 99213 OFFICE O/P EST LOW 20 MIN: CPT | Mod: 25 | Performed by: FAMILY MEDICINE

## 2023-03-24 PROCEDURE — 80061 LIPID PANEL: CPT | Mod: ZL | Performed by: FAMILY MEDICINE

## 2023-03-24 RX ORDER — AMLODIPINE BESYLATE 10 MG/1
10 TABLET ORAL DAILY
Qty: 90 TABLET | Refills: 4 | Status: SHIPPED | OUTPATIENT
Start: 2023-03-24 | End: 2024-03-25

## 2023-03-24 ASSESSMENT — ENCOUNTER SYMPTOMS
FREQUENCY: 1
PALPITATIONS: 0
EYE PAIN: 0
SORE THROAT: 0
HEADACHES: 0
COUGH: 0
DIZZINESS: 1
HEMATURIA: 0
NERVOUS/ANXIOUS: 0
PARESTHESIAS: 0
JOINT SWELLING: 0
DYSURIA: 0
SHORTNESS OF BREATH: 0
DIARRHEA: 0
NAUSEA: 0
HEARTBURN: 0
WEAKNESS: 0
CONSTIPATION: 0
ABDOMINAL PAIN: 1
HEMATOCHEZIA: 0
MYALGIAS: 0
ARTHRALGIAS: 1
FEVER: 0
CHILLS: 0

## 2023-03-24 ASSESSMENT — ACTIVITIES OF DAILY LIVING (ADL): CURRENT_FUNCTION: NO ASSISTANCE NEEDED

## 2023-03-24 ASSESSMENT — PAIN SCALES - GENERAL: PAINLEVEL: NO PAIN (0)

## 2023-03-24 NOTE — PROGRESS NOTES
"SUBJECTIVE:   Denton is a 62 year old who presents for Preventive Visit.  No flowsheet data found.  Patient has been advised of split billing requirements and indicates understanding: Yes  Are you in the first 12 months of your Medicare coverage?  No    Healthy Habits:     In general, how would you rate your overall health?  Good    Frequency of exercise:  None    Do you usually eat at least 4 servings of fruit and vegetables a day, include whole grains    & fiber and avoid regularly eating high fat or \"junk\" foods?  No    Taking medications regularly:  Yes    Medication side effects:  None and No muscle aches    Ability to successfully perform activities of daily living:  No assistance needed    Home Safety:  No safety concerns identified    Hearing Impairment:  No hearing concerns    In the past 6 months, have you been bothered by leaking of urine?  No    In general, how would you rate your overall mental or emotional health?  Good      PHQ-2 Total Score: 0    Additional concerns today:  No      Have you ever done Advance Care Planning? (For example, a Health Directive, POLST, or a discussion with a medical provider or your loved ones about your wishes): Yes, patient states has an Advance Care Planning document and will bring a copy to the clinic.       Fall risk  Fallen 2 or more times in the past year?: Yes  Any fall with injury in the past year?: Yes    Cognitive Screening   1) Repeat 3 items (Leader, Season, Winter)    2) Clock draw: NORMAL  3) 3 item recall: Recalls 3 objects  Results: 3 items recalled: COGNITIVE IMPAIRMENT LESS LIKELY    Mini-CogTM Copyright [employer]. Licensed by the author for use in [facility]; reprinted with permission (abhi@.[facility]). All rights reserved.      Do you have sleep apnea, excessive snoring or daytime drowsiness?: yes    Reviewed and updated as needed this visit by clinical staff   Tobacco  Allergies  Meds   Med Hx  Surg Hx  Fam Hx  Soc Hx        Reviewed and " updated as needed this visit by Provider                 Social History     Tobacco Use     Smoking status: Never     Smokeless tobacco: Never   Substance Use Topics     Alcohol use: Yes     Alcohol/week: 0.0 standard drinks     Comment: 6 a year         Alcohol Use 3/24/2023   Prescreen: >3 drinks/day or >7 drinks/week? No     Do you have a current opioid prescription? No  Do you use any other controlled substances or medications that are not prescribed by a provider? None               Current providers sharing in care for this patient include:    Patient Care Team:  Srinivasa Ambrose MD as PCP - General (Family Practice)  Srinivasa Ambrose MD as Assigned PCP    The following health maintenance items are reviewed in Epic and correct as of today:  Health Maintenance   Topic Date Due     Pneumococcal Vaccine: Pediatrics (0 to 5 Years) and At-Risk Patients (6 to 64 Years) (1 - PCV) Never done     HIV SCREENING  Never done     HEPATITIS C SCREENING  Never done     MEDICARE ANNUAL WELLNESS VISIT  03/24/2024     COLORECTAL CANCER SCREENING  03/16/2025     LIPID  03/10/2027     ADVANCE CARE PLANNING  03/11/2027     DTAP/TDAP/TD IMMUNIZATION (2 - Td or Tdap) 10/02/2030     PHQ-2 (once per calendar year)  Completed     INFLUENZA VACCINE  Completed     ZOSTER IMMUNIZATION  Completed     COVID-19 Vaccine  Completed     IPV IMMUNIZATION  Aged Out     MENINGITIS IMMUNIZATION  Aged Out     Labs reviewed in [facility]  Current Outpatient Medications   Medication Sig Dispense Refill     amLODIPine (NORVASC) 10 MG tablet Take 1 tablet (10 mg) by mouth daily 90 tablet 4     olmesartan-hydrochlorothiazide (BENICAR HCT) 20-12.5 MG tablet Take 1 tablet by mouth daily (Patient not taking: Reported on 3/24/2023) 40 tablet 0     Allergies   Allergen Reactions     Morphine Nausea and Vomiting             Review of Systems   Constitutional: Negative for chills and fever.   HENT: Positive for hearing loss. Negative for congestion, ear pain and sore  "throat.    Eyes: Negative for pain and visual disturbance.   Respiratory: Negative for cough and shortness of breath.    Cardiovascular: Negative for chest pain, palpitations and peripheral edema.   Gastrointestinal: Positive for abdominal pain. Negative for constipation, diarrhea, heartburn, hematochezia and nausea.   Genitourinary: Positive for frequency and urgency. Negative for dysuria, genital sores, hematuria, impotence and penile discharge.   Musculoskeletal: Positive for arthralgias. Negative for joint swelling and myalgias.   Skin: Negative for rash.   Neurological: Positive for dizziness. Negative for weakness, headaches and paresthesias.   Psychiatric/Behavioral: Negative for mood changes. The patient is not nervous/anxious.          OBJECTIVE:   BP (!) 148/82   Pulse (!) 48   Temp 97.2  F (36.2  C) (Tympanic)   Resp 16   Ht 1.873 m (6' 1.75\")   Wt 131.5 kg (290 lb)   SpO2 98%   BMI 37.49 kg/m   Estimated body mass index is 37.49 kg/m  as calculated from the following:    Height as of this encounter: 1.873 m (6' 1.75\").    Weight as of this encounter: 131.5 kg (290 lb).  Physical Exam      Diagnostic Test Results:  Labs reviewed in Epic  Results for orders placed or performed in visit on 03/24/23 (from the past 24 hour(s))   PSA Screen GH   Result Value Ref Range    Prostate Specific Antigen Screen 1.77 0.00 - 4.50 ng/mL    Narrative    This result is obtained using the Roche Elecsys total PSA method on the nida e411 immunoassay analyzer. Results obtained with different assay methods or kits cannot be used interchangeably.   Lipid Panel   Result Value Ref Range    Cholesterol 163 <200 mg/dL    Triglycerides 77 <150 mg/dL    Direct Measure HDL 46 >=40 mg/dL    LDL Cholesterol Calculated 102 (H) <=100 mg/dL    Non HDL Cholesterol 117 <130 mg/dL    Narrative    Cholesterol  Desirable:  <200 mg/dL    Triglycerides  Normal:  Less than 150 mg/dL  Borderline High:  150-199 mg/dL  High:  200-499 " mg/dL  Very High:  Greater than or equal to 500 mg/dL    Direct Measure HDL  Female:  Greater than or equal to 50 mg/dL   Male:  Greater than or equal to 40 mg/dL    LDL Cholesterol  Desirable:  <100mg/dL  Above Desirable:  100-129 mg/dL   Borderline High:  130-159 mg/dL   High:  160-189 mg/dL   Very High:  >= 190 mg/dL    Non HDL Cholesterol  Desirable:  130 mg/dL  Above Desirable:  130-159 mg/dL  Borderline High:  160-189 mg/dL  High:  190-219 mg/dL  Very High:  Greater than or equal to 220 mg/dL       ASSESSMENT / PLAN:       ICD-10-CM    1. Encounter for Medicare annual wellness exam  Z00.00       2. Essential hypertension  I10 amLODIPine (NORVASC) 10 MG tablet      3. Screening for prostate cancer  Z12.5 PSA Screen GH     PSA Screen GH      4. Lipid screening  Z13.220 Lipid Panel     Lipid Panel                COUNSELING:  Reviewed preventive health counseling, as reflected in patient instructions       Regular exercise       Healthy diet/nutrition        He reports that he has never smoked. He has never used smokeless tobacco.      Appropriate preventive services were discussed with this patient, including applicable screening as appropriate for cardiovascular disease, diabetes, osteopenia/osteoporosis, and glaucoma.  As appropriate for age/gender, discussed screening for colorectal cancer, prostate cancer, breast cancer, and cervical cancer. Checklist reviewing preventive services available has been given to the patient.    Reviewed patients plan of care and provided an AVS. The Basic Care Plan (routine screening as documented in Health Maintenance) for Denton meets the Care Plan requirement. This Care Plan has been established and reviewed with the Patient.          Srinivasa Ambrose MD  [facility]    Identified Health Risks:

## 2023-03-24 NOTE — NURSING NOTE
"No chief complaint on file.      Initial BP (!) 146/84   Pulse (!) 48   Temp 97.2  F (36.2  C) (Tympanic)   Resp 16   Ht 1.873 m (6' 1.75\")   Wt 131.5 kg (290 lb)   SpO2 98%   BMI 37.49 kg/m   Estimated body mass index is 37.49 kg/m  as calculated from the following:    Height as of this encounter: 1.873 m (6' 1.75\").    Weight as of this encounter: 131.5 kg (290 lb).  Medication Reconciliation: complete    FOOD SECURITY SCREENING QUESTIONS  Hunger Vital Signs:  Within the past 12 months we worried whether our food would run out before we got money to buy more. Never  Within the past 12 months the food we bought just didn't last and we didn't have money to get more. Never  Freya Cabrera LPN 3/24/2023 9:32 AM        "

## 2023-03-24 NOTE — PROGRESS NOTES
"SUBJECTIVE:   CC: Denton is an 62 year old who presents for preventative health visit.   No flowsheet data found.  Patient has been advised of split billing requirements and indicates understanding: Yes  Healthy Habits:     In general, how would you rate your overall health?  Good    Frequency of exercise:  None    Do you usually eat at least 4 servings of fruit and vegetables a day, include whole grains    & fiber and avoid regularly eating high fat or \"junk\" foods?  No    Taking medications regularly:  Yes    Medication side effects:  None and No muscle aches    Ability to successfully perform activities of daily living:  No assistance needed    Home Safety:  No safety concerns identified    Hearing Impairment:  No hearing concerns    In the past 6 months, have you been bothered by leaking of urine?  No    In general, how would you rate your overall mental or emotional health?  Good      PHQ-2 Total Score: 0    Additional concerns today:  No       Ability to successfully perform activities of daily living: Yes, no assistance needed  Home safety:  none identified   Hearing impairment: No            Today's PHQ-2 Score:   PHQ-2 ( 1999 [employer]) 3/24/2023   Q1: Little interest or pleasure in doing things 0   Q2: Feeling down, depressed or hopeless 0   PHQ-2 Score 0   PHQ-2 Total Score (12-17 Years)- Positive if 3 or more points; Administer PHQ-A if positive -   Q1: Little interest or pleasure in doing things Not at all   Q2: Feeling down, depressed or hopeless Not at all   PHQ-2 Score 0           Social History     Tobacco Use     Smoking status: Never     Smokeless tobacco: Never   Substance Use Topics     Alcohol use: Yes     Alcohol/week: 0.0 standard drinks     Comment: 6 a year         Alcohol Use 3/24/2023   Prescreen: >3 drinks/day or >7 drinks/week? No       Last PSA:   Prostate Specific Antigen Screen   Date Value Ref Range Status   03/10/2022 1.41 0.00 - 4.00 ug/L Final       Reviewed orders with patient. " Reviewed health maintenance and updated orders accordingly - Yes  Labs reviewed in [facility]  Current Outpatient Medications   Medication Sig Dispense Refill     olmesartan-hydrochlorothiazide (BENICAR HCT) 20-12.5 MG tablet Take 1 tablet by mouth daily (Patient not taking: Reported on 3/24/2023) 40 tablet 0     Allergies   Allergen Reactions     Morphine Nausea and Vomiting       Reviewed and updated as needed this visit by clinical staff   Tobacco  Allergies  Meds   Med Hx  Surg Hx  Fam Hx  Soc Hx        Reviewed and updated as needed this visit by Provider                 Past Medical History:   Diagnosis Date     Complete tear of right rotator cuff     1/22/2018     Impingement syndrome of right shoulder     1/2/2018     Olecranon bursitis of right elbow     4/14/2016     Other shoulder lesions, right shoulder     1/2/2018     Other specified postprocedural states     2/8/2018     Pneumonia     2/14/2012     Primary osteoarthritis of right shoulder     1/2/2018     S/P arthroscopy of right shoulder 2/13/2018      Past Surgical History:   Procedure Laterality Date     ARTHROSCOPY SHOULDER Right 02/08/2018     AS REPAIR CRUCIATE LIGAMENT,KNEE Left     [id number],ANTERIOR CRUCIATE LIGAMENT REPAIR,Left,with graft       Review of Systems   Constitutional: Negative for chills and fever.   HENT: Positive for hearing loss. Negative for congestion, ear pain and sore throat.    Eyes: Negative for pain and visual disturbance.   Respiratory: Negative for cough and shortness of breath.    Cardiovascular: Negative for chest pain, palpitations and peripheral edema.   Gastrointestinal: Positive for abdominal pain. Negative for constipation, diarrhea, heartburn, hematochezia and nausea.   Genitourinary: Positive for frequency and urgency. Negative for dysuria, genital sores, hematuria, impotence and penile discharge.   Musculoskeletal: Positive for arthralgias. Negative for joint swelling and myalgias.   Skin: Negative for rash.  "  Neurological: Positive for dizziness. Negative for weakness, headaches and paresthesias.   Psychiatric/Behavioral: Negative for mood changes. The patient is not nervous/anxious.         Had a fall on the ice in Jan. Was seen here and had hypertension. Started meds. Vertigo is now gone. Home BP running 140/70 or so on the meds, stopped them 10 days ago and it is not any lower.    Some epigastric pains, mild. Resolves with eating or drinking foods. Comes on perhaps 1-2 times a week. No blood in stool, no melena.     Nocturia once.         OBJECTIVE:   BP (!) 146/84   Pulse (!) 48   Temp 97.2  F (36.2  C) (Tympanic)   Resp 16   Ht 1.873 m (6' 1.75\")   Wt 131.5 kg (290 lb)   SpO2 98%   BMI 37.49 kg/m      Physical Exam  GENERAL: healthy, alert and no distress  EYES: Eyes grossly normal to inspection, PERRL and conjunctivae and sclerae normal  HENT: ear canals and TM's normal, nose and mouth without ulcers or lesions  NECK: no adenopathy, no asymmetry, masses, or scars and thyroid normal to palpation  RESP: lungs clear to auscultation - no rales, rhonchi or wheezes  CV: regular rate and rhythm, normal S1 S2, no S3 or S4, no murmur, click or rub, no peripheral edema and peripheral pulses strong  ABDOMEN: soft, nontender, no hepatosplenomegaly, no masses and bowel sounds normal  MS: no gross musculoskeletal defects noted, no edema  SKIN: no suspicious lesions or rashes  NEURO: Normal strength and tone, mentation intact and speech normal  PSYCH: mentation appears normal, affect normal/bright    Diagnostic Test Results:  Labs reviewed in Epic  Results for orders placed or performed in visit on 03/24/23   PSA Screen GH     Status: Normal   Result Value Ref Range    Prostate Specific Antigen Screen 1.77 0.00 - 4.50 ng/mL    Narrative    This result is obtained using the Roche Elecsys total PSA method on the nida e411 immunoassay analyzer. Results obtained with different assay methods or kits cannot be used " "interchangeably.   Lipid Panel     Status: Abnormal   Result Value Ref Range    Cholesterol 163 <200 mg/dL    Triglycerides 77 <150 mg/dL    Direct Measure HDL 46 >=40 mg/dL    LDL Cholesterol Calculated 102 (H) <=100 mg/dL    Non HDL Cholesterol 117 <130 mg/dL    Narrative    Cholesterol  Desirable:  <200 mg/dL    Triglycerides  Normal:  Less than 150 mg/dL  Borderline High:  150-199 mg/dL  High:  200-499 mg/dL  Very High:  Greater than or equal to 500 mg/dL    Direct Measure HDL  Female:  Greater than or equal to 50 mg/dL   Male:  Greater than or equal to 40 mg/dL    LDL Cholesterol  Desirable:  <100mg/dL  Above Desirable:  100-129 mg/dL   Borderline High:  130-159 mg/dL   High:  160-189 mg/dL   Very High:  >= 190 mg/dL    Non HDL Cholesterol  Desirable:  130 mg/dL  Above Desirable:  130-159 mg/dL  Borderline High:  160-189 mg/dL  High:  190-219 mg/dL  Very High:  Greater than or equal to 220 mg/dL         ASSESSMENT/PLAN:       ICD-10-CM    1. Encounter for Medicare annual wellness exam  Z00.00       2. Essential hypertension  I10 amLODIPine (NORVASC) 10 MG tablet      3. Screening for prostate cancer  Z12.5 PSA Screen GH      4. Lipid screening  Z13.220 Lipid Panel          Blood pressure did not change on the benicar. Is still high, so will change to norvasc. Discussed with him diet/wt loss for hypertension management too.           COUNSELING:   Reviewed preventive health counseling, as reflected in patient instructions       Regular exercise       Healthy diet/nutrition       Prostate cancer screening      BMI:   Estimated body mass index is 37.49 kg/m  as calculated from the following:    Height as of this encounter: 1.873 m (6' 1.75\").    Weight as of this encounter: 131.5 kg (290 lb).   Weight management plan: Discussed healthy diet and exercise guidelines      He reports that he has never smoked. He has never used smokeless tobacco.            Srinivasa Ambrose MD  [facility]  "

## 2023-03-24 NOTE — LETTER
March 24, 2023      Denton Palacios  [address] [place] [address]        Dear ,    We are writing to inform you of your test results.    Your test results fall within the expected range(s) or remain unchanged from previous results.  Please continue with current treatment plan.    Resulted Orders   PSA Screen GH   Result Value Ref Range    Prostate Specific Antigen Screen 1.77 0.00 - 4.50 ng/mL    Narrative    This result is obtained using the Roche Elecsys total PSA method on the nida e411 immunoassay analyzer. Results obtained with different assay methods or kits cannot be used interchangeably.   Lipid Panel   Result Value Ref Range    Cholesterol 163 <200 mg/dL    Triglycerides 77 <150 mg/dL    Direct Measure HDL 46 >=40 mg/dL    LDL Cholesterol Calculated 102 (H) <=100 mg/dL    Non HDL Cholesterol 117 <130 mg/dL    Narrative    Cholesterol  Desirable:  <200 mg/dL    Triglycerides  Normal:  Less than 150 mg/dL  Borderline High:  150-199 mg/dL  High:  200-499 mg/dL  Very High:  Greater than or equal to 500 mg/dL    Direct Measure HDL  Female:  Greater than or equal to 50 mg/dL   Male:  Greater than or equal to 40 mg/dL    LDL Cholesterol  Desirable:  <100mg/dL  Above Desirable:  100-129 mg/dL   Borderline High:  130-159 mg/dL   High:  160-189 mg/dL   Very High:  >= 190 mg/dL    Non HDL Cholesterol  Desirable:  130 mg/dL  Above Desirable:  130-159 mg/dL  Borderline High:  160-189 mg/dL  High:  190-219 mg/dL  Very High:  Greater than or equal to 220 mg/dL       If you have any questions or concerns, please call the clinic at the number listed above.       Sincerely,      Srinivasa Ambrose MD

## 2023-03-24 NOTE — PATIENT INSTRUCTIONS
Patient Education   Personalized Prevention Plan  You are due for the preventive services outlined below.  Your care team is available to assist you in scheduling these services.  If you have already completed any of these items, please share that information with your care team to update in your medical record.  Health Maintenance Due   Topic Date Due     Pneumococcal Vaccine (1 - PCV) Never done     HIV Screening  Never done     Hepatitis C Screening  Never done

## 2023-07-05 ENCOUNTER — DOCUMENTATION ONLY (OUTPATIENT)
Dept: FAMILY MEDICINE | Facility: OTHER | Age: 63
End: 2023-07-05
Payer: COMMERCIAL

## 2023-07-05 DIAGNOSIS — G47.33 OBSTRUCTIVE SLEEP APNEA (ADULT) (PEDIATRIC): Primary | ICD-10-CM

## 2024-01-29 ENCOUNTER — APPOINTMENT (OUTPATIENT)
Dept: GENERAL RADIOLOGY | Facility: OTHER | Age: 64
End: 2024-01-29
Attending: EMERGENCY MEDICINE
Payer: MEDICARE

## 2024-01-29 ENCOUNTER — HOSPITAL ENCOUNTER (EMERGENCY)
Facility: OTHER | Age: 64
Discharge: HOME | End: 2024-01-29
Attending: EMERGENCY MEDICINE | Admitting: EMERGENCY MEDICINE
Payer: MEDICARE

## 2024-01-29 ENCOUNTER — APPOINTMENT (OUTPATIENT)
Dept: MRI IMAGING | Facility: OTHER | Age: 64
End: 2024-01-29
Attending: EMERGENCY MEDICINE
Payer: MEDICARE

## 2024-01-29 VITALS
RESPIRATION RATE: 18 BRPM | TEMPERATURE: 98 F | HEART RATE: 60 BPM | OXYGEN SATURATION: 94 % | DIASTOLIC BLOOD PRESSURE: 80 MMHG | SYSTOLIC BLOOD PRESSURE: 150 MMHG | WEIGHT: 280 LBS | BODY MASS INDEX: 36.19 KG/M2

## 2024-01-29 DIAGNOSIS — S49.92XA SHOULDER INJURY, LEFT, INITIAL ENCOUNTER: ICD-10-CM

## 2024-01-29 DIAGNOSIS — S76.111A QUADRICEPS TENDON RUPTURE, RIGHT, INITIAL ENCOUNTER: ICD-10-CM

## 2024-01-29 PROCEDURE — 99284 EMERGENCY DEPT VISIT MOD MDM: CPT | Mod: 25 | Performed by: EMERGENCY MEDICINE

## 2024-01-29 PROCEDURE — 73721 MRI JNT OF LWR EXTRE W/O DYE: CPT | Mod: RT,MA

## 2024-01-29 PROCEDURE — 73030 X-RAY EXAM OF SHOULDER: CPT | Mod: LT

## 2024-01-29 PROCEDURE — 73562 X-RAY EXAM OF KNEE 3: CPT | Mod: RT

## 2024-01-29 PROCEDURE — 99284 EMERGENCY DEPT VISIT MOD MDM: CPT | Performed by: EMERGENCY MEDICINE

## 2024-01-29 PROCEDURE — 250N000013 HC RX MED GY IP 250 OP 250 PS 637: Performed by: EMERGENCY MEDICINE

## 2024-01-29 RX ORDER — OXYCODONE HYDROCHLORIDE 5 MG/1
5 TABLET ORAL ONCE
Status: COMPLETED | OUTPATIENT
Start: 2024-01-29 | End: 2024-01-29

## 2024-01-29 RX ORDER — ACETAMINOPHEN 500 MG
1000 TABLET ORAL ONCE
Status: COMPLETED | OUTPATIENT
Start: 2024-01-29 | End: 2024-01-29

## 2024-01-29 RX ORDER — OXYCODONE HYDROCHLORIDE 5 MG/1
5 TABLET ORAL EVERY 6 HOURS PRN
Qty: 12 TABLET | Refills: 0 | Status: SHIPPED | OUTPATIENT
Start: 2024-01-29 | End: 2024-02-01

## 2024-01-29 RX ADMIN — ACETAMINOPHEN 1000 MG: 500 TABLET, FILM COATED ORAL at 09:58

## 2024-01-29 RX ADMIN — OXYCODONE HYDROCHLORIDE 5 MG: 5 TABLET ORAL at 12:16

## 2024-01-29 ASSESSMENT — ACTIVITIES OF DAILY LIVING (ADL)
ADLS_ACUITY_SCORE: 35

## 2024-01-29 NOTE — PROGRESS NOTES
Pt's wife came out to report pt no has new symptom or concern. Pt feels there is also something wrong with his left shoulder. Information relayed to pt's RN Heather.

## 2024-01-29 NOTE — DISCHARGE INSTRUCTIONS
Do not bear any weight on your right leg  Wear knee immobilizer when up and about but you may remove it to sleep  The [facility] will contact you to schedule your appointment after they have reviewed the MRI

## 2024-01-29 NOTE — Clinical Note
Morris Boltonjovon accompanied Denton Palacios to the emergency department on 1/29/2024. They may return to work on 01/30/2024.  Morris will need to be off work for follow-up with the [facility]     If you have any questions or concerns, please don't hesitate to call.      Alla Sheridan MD

## 2024-01-29 NOTE — ED TRIAGE NOTES
Pt arrive after falling about 30 mins prior to arrival on ice. Pts right knee bent wrong and he is unable to put full weight on it. CMS WDL. Pt denies hitting hitting head or loss oc consciousness.  BP (!) 150/80   Pulse 60   Temp 98  F (36.7  C) (Tympanic)   Resp 18   Wt 127 kg (280 lb)   SpO2 94%   BMI 36.19 kg/m         Triage Assessment (Adult)       Row Name 01/29/24 0851          Triage Assessment    Airway WDL WDL        Respiratory WDL    Respiratory WDL WDL        Skin Circulation/Temperature WDL    Skin Circulation/Temperature WDL WDL        Cardiac WDL    Cardiac WDL WDL        Peripheral/Neurovascular WDL    Peripheral Neurovascular WDL X        Cognitive/Neuro/Behavioral WDL    Cognitive/Neuro/Behavioral WDL WDL

## 2024-01-29 NOTE — ED PROVIDER NOTES
[facility]  Emergency Department Visit Note    Fall and Knee Injury      History of Present Illness     HPI:  Denton Palacios is a 63 year old male presenting with right knee pain. This started less than one hour prior to arrival after injury described as slipped on ice and his knee bent in an unnatural way. He is unable to bear weight without pain..He has not had injuries or surgeries to this knee in the past. No numbness or tingling in lower leg or foot. Able to wiggle toes without difficulty. He also hurt his left shoulder during the fall but is able to move it.  He has ruptured the quadricep tendon on the left knee and had 3 failed repairs with a definitive repair at [facility].  This required tendon grafting.  He is also had rotator cuff repair on the right shoulder    Medications:  Prior to Admission medications    Medication Sig Last Dose Taking? Auth Provider Long Term End Date   oxyCODONE (ROXICODONE) 5 MG tablet Take 1 tablet (5 mg) by mouth every 6 hours as needed for pain  Yes Alla Sheridan MD  2/1/24   amLODIPine (NORVASC) 10 MG tablet Take 1 tablet (10 mg) by mouth daily   Srinivasa Ambrose MD Yes    olmesartan-hydrochlorothiazide (BENICAR HCT) 20-12.5 MG tablet Take 1 tablet by mouth daily  Patient not taking: Reported on 3/24/2023   Paris Stockton, APRN CNP Yes        Allergies:  Allergies   Allergen Reactions    Morphine Nausea and Vomiting       Problem List:  Patient Active Problem List   Diagnosis    Arthritis of right acromioclavicular joint    Impingement syndrome of right shoulder    Olecranon bursitis of right elbow    Quadriceps tendon rupture    Right rotator cuff tendinitis    Sleep apnea    S/P arthroscopy of right shoulder    Complete tear of right rotator cuff    Morbid obesity (H)       Past Medical History:  Past Medical History:   Diagnosis Date    Complete tear of right rotator cuff     1/22/2018    Impingement syndrome of right shoulder     1/2/2018     Olecranon bursitis of right elbow     4/14/2016    Other shoulder lesions, right shoulder     1/2/2018    Other specified postprocedural states     2/8/2018    Pneumonia     2/14/2012    Primary osteoarthritis of right shoulder     1/2/2018    S/P arthroscopy of right shoulder 2/13/2018       Past Surgical History:  Past Surgical History:   Procedure Laterality Date    ARTHROSCOPY SHOULDER Right 02/08/2018    AS REPAIR CRUCIATE LIGAMENT,KNEE Left     [id number],ANTERIOR CRUCIATE LIGAMENT REPAIR,Left,with graft       Social History:  Social History     Tobacco Use    Smoking status: Never    Smokeless tobacco: Never   Vaping Use    Vaping Use: Never used   Substance Use Topics    Alcohol use: Yes     Alcohol/week: 0.0 standard drinks of alcohol     Comment: 6 a year    Drug use: No       Review of Systems:  Complete review of systems obtained and pertinent positive and negative findings noted in HPI. Review of systems otherwise negative.      Physical Exam     Vital signs: BP (!) 150/80   Pulse 60   Temp 98  F (36.7  C) (Tympanic)   Resp 18   Wt 127 kg (280 lb)   SpO2 94%   BMI 36.19 kg/m      Physical Exam:    General: awake and alert, uncomfortable  HEENT: atraumatic, no scleral injection, no nasal discharge, neck supple  Chest: non labored respirations, symmetric chest rise, no accessory muscle use  Cardiovascular: regular rate, regular rhythm, distally capillary refill intact  Abdomen: soft, nondistended  Extremities: right knee is with obvious deformity of above the patella without pain over patella. No effusion, has no erythema, unable to fully extend knee and lift leg up from bed with knee extended  Left shoulder with tenderness anteriorly.  He has pain with abduction to 90 degrees and above.  Skin: warm, dry, no rashes  Neuro: alert and oriented x 3, moving extremities x 4, sensation intact to light touch bilateral lower extremities and plantar/dorsiflexion 5/5 bilaterally    Medical Decision Making & ED  Course     Denton Palacios is a 63 year old male presenting with right knee pain with exam consistent with quadricep tendon tear.  He does have shoulder injury which is most likely ligamentous or tendon as well. Differential also includes fracture, contusion, sprain, ligamentous tear, bursitis. Given concern for possible fracture, x-rays obtained and negative for acute fracture or dislocation.  MRI testing of knee performed which does show quadricep tendon tear.  I did speak with his surgeons at [facility] and they have received his MRI and will contact him regarding follow-up.  He is placed in knee immobilizer and crutches.  He is to be nonweightbearing and keep leg elevated is much as possible.  He has been given oxycodone for pain.  . The patient was discharged in stable condition.    I have reviewed the patient's Medical Imaging and Medical Records.    Diagnosis & Disposition     Diagnosis:  1. Quadriceps tendon rupture, right, initial encounter    2. Shoulder injury, left, initial encounter        Disposition:  Home    MD Arvin Decker Theresa M, MD  01/29/24 1395

## 2024-01-30 NOTE — PROGRESS NOTES
12:22 PM Patient contacted the emergency department today requesting pain medication to get him through until he sees his orthopedic surgeon on Friday, Feb 2nd.  I reviewed his medical chart from his visit 1/29/24 for quadricepts tendon rupture. He was prescribed oxycodone 5 mg by mouth every 6 hours as needed, 12 tablets. I did prescribe an additional 8 tablets to get him through Friday. This was faxed to [facility] pharmacy. No refills. I reviewed [place] Prescription Drug Monitoring Program website prior to prescribing.  NAYAN Jefferson CNP on 1/30/2024 at 12:32 PM

## 2024-02-06 ENCOUNTER — MYC MEDICAL ADVICE (OUTPATIENT)
Dept: FAMILY MEDICINE | Facility: OTHER | Age: 64
End: 2024-02-06
Payer: COMMERCIAL

## 2024-02-06 DIAGNOSIS — R39.198 DIFFICULTY URINATING: Primary | ICD-10-CM

## 2024-02-06 RX ORDER — TAMSULOSIN HYDROCHLORIDE 0.4 MG/1
0.4 CAPSULE ORAL DAILY
Qty: 90 CAPSULE | Refills: 1 | Status: SHIPPED | OUTPATIENT
Start: 2024-02-06 | End: 2024-03-25

## 2024-02-09 ENCOUNTER — TELEPHONE (OUTPATIENT)
Dept: FAMILY MEDICINE | Facility: OTHER | Age: 64
End: 2024-02-09
Payer: COMMERCIAL

## 2024-02-09 PROCEDURE — G0180 MD CERTIFICATION HHA PATIENT: HCPCS | Performed by: FAMILY MEDICINE

## 2024-02-09 NOTE — TELEPHONE ENCOUNTER
The physical therapy evaluation and treatment that was ordered was completed on 2/9/24      Request for additional Home Care Physical Therapy orders as follows:      Continuation frequency =   ___2___  x week x  ___1___ week(s)    Effective date = 2/12/24      Continuation frequency =   ___1___  x week x  ___1___ week(s)    Effective date = 2/19/24      Interventions include:    Gait Training  Transfer Training  Education - home safety  Instruction - home exercise program  Therapeutic exercise              Therapist: Eneida Corado DPT    Please respond with .HOMECAREAGREE, if you are in agreement. Please sign with your comments and signature, if you are not in agreement.

## 2024-02-15 ENCOUNTER — TELEPHONE (OUTPATIENT)
Dept: FAMILY MEDICINE | Facility: OTHER | Age: 64
End: 2024-02-15
Payer: COMMERCIAL

## 2024-02-15 DIAGNOSIS — S76.111D STRAIN OF RIGHT QUADRICEPS MUSCLE, FASCIA AND TENDON, SUBSEQUENT ENCOUNTER: Primary | ICD-10-CM

## 2024-02-15 NOTE — TELEPHONE ENCOUNTER
Srinivasa Ambrose  reviewed and completed the following home care form for Denton Palacios on 02/09/2024   This covers the certification period effective 02/09/2024 to 04/08/2024.  Freya Cabrera LPN on 2/15/2024 at 4:37 PM

## 2024-02-23 ENCOUNTER — THERAPY VISIT (OUTPATIENT)
Dept: PHYSICAL THERAPY | Facility: OTHER | Age: 64
End: 2024-02-23
Payer: MEDICARE

## 2024-02-23 DIAGNOSIS — S76.111A RUPTURE OF QUADRICEPS TENDON, RIGHT, INITIAL ENCOUNTER: ICD-10-CM

## 2024-02-23 PROCEDURE — 97110 THERAPEUTIC EXERCISES: CPT | Mod: GP,PO

## 2024-02-23 PROCEDURE — 97161 PT EVAL LOW COMPLEX 20 MIN: CPT | Mod: GP,PO

## 2024-02-23 NOTE — PROGRESS NOTES
PHYSICAL THERAPY EVALUATION  Type of Visit: Evaluation    See electronic medical record for Abuse and Falls Screening details.    Subjective       Presenting condition or subjective complaint: Quadriceps Tendon Rupture, right side  Patient reports he sustained a right quadriceps tendon rupture as a result of slipping on ice and falling on 1/29/2024.  Patient underwent right quadriceps tendon repair at the [facility] performed by Dr. Schwartz on 2/5/2024.  Patient did receive home care upon returning home.  Patient had a virtual visit for orthopedic follow-up on 2/22/2024.  Patient reports continued progress at this time.  He is careful to maintain appropriate weightbearing status with axillary crutches at this time.  He is following guidelines for use of T scope brace at this time.  He has not remove the brace other than to replace the Ace wrap and perform bandage changes.  He is kept the brace locked at all times.  Date of onset:  (Date of surgery: 2/5/2024.  Date of injury 1/29/2024.)    Relevant medical history: Arthritis   Dates & types of surgery: Repair of QTR on 2/5/24 at [facility]    Prior diagnostic imaging/testing results: MRI; X-ray     Prior therapy history for the same diagnosis, illness or injury: No      Prior Level of Function  No prior functional limitations reported.  Living Environment  Social support: With a significant other or spouse   Type of home: House   Stairs to enter the home: No       Ramp: Yes   Stairs inside the home: Yes 10 Is there a railing: Yes   Help at home: Self Cares (home health aide/personal care attendant, family, etc); Home management tasks (cooking, cleaning); Home and Yard maintenance tasks; Assist for driving and community activities  Equipment owned: Walker; Walker with wheels; Crutches; Standard wheelchair     Employment: No    Hobbies/Interests: Motorcycle riding, sxs riding, walking, boating    Patient goals for therapy: Walk independently         Objective   KNEE  EVALUATION  PAIN: Pain Level at Rest: 1/10  Pain Level with Use: 2/10  Pain Location: Anterior knee/quadriceps region  Pain Quality: Aching and Shooting  Pain Frequency: intermittent  Pain is Exacerbated By: Walking, bending, all ADL and household activities are limited at this time.  Pain is Relieved By: cold and rest  INTEGUMENTARY (edema, incisions):  Patient reports incision site is healing with no redness or drainage.  He continues to utilize an Ace wrap from the foot to the thigh to assist with swelling reduction.    GAIT: Patient ambulates short distances with axillary crutches.  He is maintaining nonweightbearing to avoid putting too much weight on the right lower extremity with touchdown weightbearing.  He is utilizing a manual wheelchair pushed by his wife for greater distance mobility at this time.    BALANCE/PROPRIOCEPTION: Not tested but impaired to due to current postoperative status.    ROM: Patient was able to perform 0-30 degrees right knee range of motion following manual protocol quadriceps tendon protocol guidelines today.  STRENGTH: Not indicated due to current postoperative status.      Assessment & Plan   CLINICAL IMPRESSIONS  Medical Diagnosis: Rupture of quadriceps tendon, right, initial encounter (S76.111A)    Treatment Diagnosis: Right knee pain, impaired range of motion, weakness, impaired gait, impaired neuromuscular control   Impression/Assessment: Patient is a 63 year old male with status post right quadriceps tendon repair complaints.  The following significant findings have been identified: Pain, Decreased ROM/flexibility, Decreased joint mobility, Decreased strength, Impaired balance, Decreased proprioception, Impaired gait, Impaired muscle performance, and Decreased activity tolerance. These impairments interfere with their ability to perform self care tasks, recreational activities, household chores, household mobility, and community mobility as compared to previous level of  function.     Clinical Decision Making (Complexity):  Clinical Presentation: Stable/Uncomplicated  Clinical Presentation Rationale: based on medical and personal factors listed in PT evaluation  Clinical Decision Making (Complexity): Low complexity    PLAN OF CARE  Treatment Interventions:  Modalities: Vasoneumatic Device  Interventions: Gait Training, Manual Therapy, Neuromuscular Re-education, Therapeutic Exercise    Long Term Goals     PT Goal 1  Goal Identifier: Right knee pain  Goal Description: Patient will report right knee pain at a 1/10 or less to allow sitting and riding in a car for 3 hours without difficulty.  Rationale: to maximize safety and independence with performance of ADLs and functional tasks;to maximize safety and independence with transportation;to maximize safety and independence within the community  Target Date: 03/22/24  PT Goal 2  Goal Identifier: Right knee range of motion  Goal Description: Patient will demonstrate right knee range of motion 0-120 degrees to allow sit to stand from standard height chair without difficulty.  Rationale: to maximize safety and independence with performance of ADLs and functional tasks;to maximize safety and independence within the home;to maximize safety and independence within the community  Target Date: 04/19/24  PT Goal 3  Goal Identifier: Weakness  Goal Description: Patient will improve right knee strength to a 4/5 greater greater to allow ascending/descending stairs in a reciprocal pattern without difficulty.  Rationale: to maximize safety and independence with performance of ADLs and functional tasks;to maximize safety and independence within the home;to maximize safety and independence within the community  Target Date: 05/17/24      Frequency of Treatment: 16 visits  Duration of Treatment: 8 weeks      Education Assessment:   Learner/Method: Patient;Listening;Reading;Demonstration;Pictures/Video;No Barriers to Learning    Risks and benefits of  evaluation/treatment have been explained.   Patient/Family/caregiver agrees with Plan of Care.     Evaluation Time:     PT Eval, Low Complexity Minutes (64486): 15       Signing Clinician: MANUEL Spain [facility]                                                                                   OUTPATIENT PHYSICAL THERAPY      PLAN OF TREATMENT FOR OUTPATIENT REHABILITATION   Patient's Last Name, First Name, Denton Smyth YOB: 1960   Provider's Name   [facility]   Medical Record No.  [id number]     Onset Date:  (Date of surgery: 2/5/2024.  Date of injury 1/29/2024.)  Start of Care Date: 02/23/24     Medical Diagnosis:  Rupture of quadriceps tendon, right, initial encounter (S76.111A)      PT Treatment Diagnosis:  Right knee pain, impaired range of motion, weakness, impaired gait, impaired neuromuscular control Plan of Treatment  Frequency/Duration: 16 visits/ 8 weeks    Certification date from 02/23/24 to 04/19/24         See note for plan of treatment details and functional goals     Marcus Talbot, PT                         I CERTIFY THE NEED FOR THESE SERVICES FURNISHED UNDER        THIS PLAN OF TREATMENT AND WHILE UNDER MY CARE .             Physician Signature               Date    X_____________________________________________________                  Referring Provider:  Dr. Efren Ambrose     Initial Assessment  See Epic Evaluation- Start of Care Date: 02/23/24

## 2024-03-01 ENCOUNTER — THERAPY VISIT (OUTPATIENT)
Dept: PHYSICAL THERAPY | Facility: OTHER | Age: 64
End: 2024-03-01
Payer: MEDICARE

## 2024-03-01 DIAGNOSIS — S76.111A RUPTURE OF QUADRICEPS TENDON, RIGHT, INITIAL ENCOUNTER: Primary | ICD-10-CM

## 2024-03-01 PROCEDURE — 97110 THERAPEUTIC EXERCISES: CPT | Mod: GP,PO

## 2024-03-07 ENCOUNTER — THERAPY VISIT (OUTPATIENT)
Dept: PHYSICAL THERAPY | Facility: OTHER | Age: 64
End: 2024-03-07
Payer: MEDICARE

## 2024-03-07 DIAGNOSIS — S76.111A RUPTURE OF QUADRICEPS TENDON, RIGHT, INITIAL ENCOUNTER: Primary | ICD-10-CM

## 2024-03-07 PROCEDURE — 97110 THERAPEUTIC EXERCISES: CPT | Mod: GP,PO

## 2024-03-11 ENCOUNTER — THERAPY VISIT (OUTPATIENT)
Dept: PHYSICAL THERAPY | Facility: OTHER | Age: 64
End: 2024-03-11
Payer: MEDICARE

## 2024-03-11 DIAGNOSIS — S76.111A RUPTURE OF QUADRICEPS TENDON, RIGHT, INITIAL ENCOUNTER: Primary | ICD-10-CM

## 2024-03-11 PROCEDURE — 97110 THERAPEUTIC EXERCISES: CPT | Mod: GP,PO

## 2024-03-18 ENCOUNTER — THERAPY VISIT (OUTPATIENT)
Dept: PHYSICAL THERAPY | Facility: OTHER | Age: 64
End: 2024-03-18
Payer: MEDICARE

## 2024-03-18 DIAGNOSIS — S76.111A RUPTURE OF QUADRICEPS TENDON, RIGHT, INITIAL ENCOUNTER: Primary | ICD-10-CM

## 2024-03-18 PROCEDURE — 97110 THERAPEUTIC EXERCISES: CPT | Mod: GP,PO

## 2024-03-21 ENCOUNTER — THERAPY VISIT (OUTPATIENT)
Dept: PHYSICAL THERAPY | Facility: OTHER | Age: 64
End: 2024-03-21
Payer: MEDICARE

## 2024-03-21 DIAGNOSIS — S76.111A RUPTURE OF QUADRICEPS TENDON, RIGHT, INITIAL ENCOUNTER: Primary | ICD-10-CM

## 2024-03-21 PROCEDURE — 97110 THERAPEUTIC EXERCISES: CPT | Mod: GP,PO

## 2024-03-21 PROCEDURE — 97116 GAIT TRAINING THERAPY: CPT | Mod: GP,PO

## 2024-03-25 ENCOUNTER — THERAPY VISIT (OUTPATIENT)
Dept: PHYSICAL THERAPY | Facility: OTHER | Age: 64
End: 2024-03-25
Payer: MEDICARE

## 2024-03-25 ENCOUNTER — OFFICE VISIT (OUTPATIENT)
Dept: FAMILY MEDICINE | Facility: OTHER | Age: 64
End: 2024-03-25
Attending: FAMILY MEDICINE
Payer: COMMERCIAL

## 2024-03-25 VITALS
RESPIRATION RATE: 18 BRPM | WEIGHT: 284.2 LBS | HEART RATE: 63 BPM | TEMPERATURE: 98.3 F | DIASTOLIC BLOOD PRESSURE: 74 MMHG | BODY MASS INDEX: 36.47 KG/M2 | OXYGEN SATURATION: 96 % | SYSTOLIC BLOOD PRESSURE: 140 MMHG | HEIGHT: 74 IN

## 2024-03-25 DIAGNOSIS — E78.00 HYPERCHOLESTEROLEMIA: ICD-10-CM

## 2024-03-25 DIAGNOSIS — Z00.00 ENCOUNTER FOR MEDICARE ANNUAL WELLNESS EXAM: Primary | ICD-10-CM

## 2024-03-25 DIAGNOSIS — Z12.5 SCREENING FOR PROSTATE CANCER: ICD-10-CM

## 2024-03-25 DIAGNOSIS — G47.33 OBSTRUCTIVE SLEEP APNEA (ADULT) (PEDIATRIC): ICD-10-CM

## 2024-03-25 DIAGNOSIS — Z29.11 NEED FOR VACCINATION AGAINST RESPIRATORY SYNCYTIAL VIRUS: ICD-10-CM

## 2024-03-25 DIAGNOSIS — Z11.59 NEED FOR HEPATITIS C SCREENING TEST: ICD-10-CM

## 2024-03-25 DIAGNOSIS — S76.111A RUPTURE OF QUADRICEPS TENDON, RIGHT, INITIAL ENCOUNTER: Primary | ICD-10-CM

## 2024-03-25 DIAGNOSIS — R39.198 DIFFICULTY URINATING: ICD-10-CM

## 2024-03-25 DIAGNOSIS — I10 ESSENTIAL HYPERTENSION: ICD-10-CM

## 2024-03-25 DIAGNOSIS — Z11.4 SCREENING FOR HIV (HUMAN IMMUNODEFICIENCY VIRUS): ICD-10-CM

## 2024-03-25 DIAGNOSIS — E66.01 MORBID OBESITY (H): ICD-10-CM

## 2024-03-25 LAB
ANION GAP SERPL CALCULATED.3IONS-SCNC: 12 MMOL/L (ref 7–15)
BUN SERPL-MCNC: 17.5 MG/DL (ref 8–23)
CALCIUM SERPL-MCNC: 9.9 MG/DL (ref 8.8–10.2)
CHLORIDE SERPL-SCNC: 107 MMOL/L (ref 98–107)
CREAT SERPL-MCNC: 0.96 MG/DL (ref 0.67–1.17)
DEPRECATED HCO3 PLAS-SCNC: 25 MMOL/L (ref 22–29)
EGFRCR SERPLBLD CKD-EPI 2021: 88 ML/MIN/1.73M2
GLUCOSE SERPL-MCNC: 120 MG/DL (ref 70–99)
HCV AB SERPL QL IA: NONREACTIVE
HIV 1+2 AB+HIV1 P24 AG SERPL QL IA: NONREACTIVE
POTASSIUM SERPL-SCNC: 4.3 MMOL/L (ref 3.4–5.3)
PSA SERPL DL<=0.01 NG/ML-MCNC: 2.47 NG/ML (ref 0–4.5)
SODIUM SERPL-SCNC: 144 MMOL/L (ref 135–145)

## 2024-03-25 PROCEDURE — 99214 OFFICE O/P EST MOD 30 MIN: CPT | Mod: 25 | Performed by: FAMILY MEDICINE

## 2024-03-25 PROCEDURE — 97016 VASOPNEUMATIC DEVICE THERAPY: CPT | Mod: GP,PO

## 2024-03-25 PROCEDURE — 97110 THERAPEUTIC EXERCISES: CPT | Mod: GP,PO

## 2024-03-25 PROCEDURE — 80048 BASIC METABOLIC PNL TOTAL CA: CPT | Mod: ZL | Performed by: FAMILY MEDICINE

## 2024-03-25 PROCEDURE — G0463 HOSPITAL OUTPT CLINIC VISIT: HCPCS

## 2024-03-25 PROCEDURE — 36415 COLL VENOUS BLD VENIPUNCTURE: CPT | Mod: ZL | Performed by: FAMILY MEDICINE

## 2024-03-25 PROCEDURE — 87389 HIV-1 AG W/HIV-1&-2 AB AG IA: CPT | Mod: ZL | Performed by: FAMILY MEDICINE

## 2024-03-25 PROCEDURE — G0439 PPPS, SUBSEQ VISIT: HCPCS | Performed by: FAMILY MEDICINE

## 2024-03-25 PROCEDURE — G0103 PSA SCREENING: HCPCS | Mod: ZL | Performed by: FAMILY MEDICINE

## 2024-03-25 PROCEDURE — 86803 HEPATITIS C AB TEST: CPT | Mod: ZL | Performed by: FAMILY MEDICINE

## 2024-03-25 RX ORDER — AMLODIPINE BESYLATE 10 MG/1
10 TABLET ORAL DAILY
Qty: 90 TABLET | Refills: 4 | Status: SHIPPED | OUTPATIENT
Start: 2024-03-25

## 2024-03-25 RX ORDER — TAMSULOSIN HYDROCHLORIDE 0.4 MG/1
0.4 CAPSULE ORAL DAILY
Qty: 90 CAPSULE | Refills: 1 | Status: SHIPPED | OUTPATIENT
Start: 2024-03-25 | End: 2024-09-12

## 2024-03-25 RX ORDER — RESPIRATORY SYNCYTIAL VIRUS VACCINE 120MCG/0.5
0.5 KIT INTRAMUSCULAR ONCE
Qty: 1 EACH | Refills: 0 | Status: SHIPPED | OUTPATIENT
Start: 2024-03-25 | End: 2024-03-25

## 2024-03-25 RX ORDER — ATORVASTATIN CALCIUM 20 MG/1
20 TABLET, FILM COATED ORAL DAILY
Qty: 90 TABLET | Refills: 4 | Status: SHIPPED | OUTPATIENT
Start: 2024-03-25

## 2024-03-25 RX ORDER — ACETAMINOPHEN 500 MG
TABLET ORAL
COMMUNITY
Start: 2024-02-05

## 2024-03-25 RX ORDER — LOSARTAN POTASSIUM 50 MG/1
50 TABLET ORAL DAILY
Qty: 90 TABLET | Refills: 4 | Status: SHIPPED | OUTPATIENT
Start: 2024-03-25

## 2024-03-25 SDOH — HEALTH STABILITY: PHYSICAL HEALTH: ON AVERAGE, HOW MANY DAYS PER WEEK DO YOU ENGAGE IN MODERATE TO STRENUOUS EXERCISE (LIKE A BRISK WALK)?: 4 DAYS

## 2024-03-25 ASSESSMENT — SOCIAL DETERMINANTS OF HEALTH (SDOH): HOW OFTEN DO YOU GET TOGETHER WITH FRIENDS OR RELATIVES?: ONCE A WEEK

## 2024-03-25 ASSESSMENT — PAIN SCALES - GENERAL: PAINLEVEL: MILD PAIN (3)

## 2024-03-25 NOTE — PROGRESS NOTES
"Preventive Care Visit  [facility]  Srinivasa Ambrose MD, Family Medicine  Mar 25, 2024      Assessment & Plan     (Z00.00) Encounter for Medicare annual wellness exam  (primary encounter diagnosis)  Comment:    Plan:      (Z29.11) Need for vaccination against respiratory syncytial virus  Comment: at pharmacy  Plan: respiratory syncytial virus vaccine, bivalent         (ABRYSVO) injection             (Z11.4) Screening for HIV (human immunodeficiency virus)  Comment:    Plan: HIV Screening             (Z11.59) Need for hepatitis C screening test  Comment:    Plan: Hepatitis C Screen Reflex to HCV RNA Quant and         Genotype             (E66.01) Morbid obesity (H)  Comment: advised weight loss to improve his hypertension control.  Plan:      (I10) Essential hypertension  Comment: not at goal, added on the cozaar and follow up in 3 months  Plan: losartan (COZAAR) 50 MG tablet, amLODIPine         (NORVASC) 10 MG tablet, Basic Metabolic Panel             (R39.198) Difficulty urinating  Comment: improved, refilled this without change  Plan: tamsulosin (FLOMAX) 0.4 MG capsule             (Z12.5) Screening for prostate cancer  Comment:    Plan: PSA Screen GH             (E78.00) Hypercholesterolemia  Comment: started this med as well.   Plan: atorvastatin (LIPITOR) 20 MG tablet           (  (G47.33) Obstructive sleep apnea (adult) (pediatric)  Comment: is very compliant. Refilled his machine. Sleep study was done in [place]. He is not sure of the setting on it. New machine ordered, as current one has exceeded its intended life, a 10 years old .   Plan: CPAP Order for DME - ONLY FOR DME                           BMI  Estimated body mass index is 36.49 kg/m  as calculated from the following:    Height as of this encounter: 1.88 m (6' 2\").    Weight as of this encounter: 128.9 kg (284 lb 3.2 oz).   Weight management plan: Discussed healthy diet and exercise guidelines    Counseling  Appropriate preventive " "services were discussed with this patient, including applicable screening as appropriate for fall prevention, nutrition, physical activity, Tobacco-use cessation, weight loss and cognition.  Checklist reviewing preventive services available has been given to the patient.  Reviewed patient's diet, addressing concerns and/or questions.   He is at risk for psychosocial distress and has been provided with information to reduce risk.   Discussed possible causes of fatigue. The patient was provided with written information regarding signs of hearing loss.           Return in about 3 months (around 6/25/2024) for Follow up.    Wesley Chawla is a 63 year old, presenting for the following:  Physical        3/25/2024     7:54 AM   Additional Questions   Roomed by SADAF Pillai   Accompanied by Self         3/25/2024     7:54 AM   Patient Reported Additional Medications   Patient reports taking the following new medications N/A         Health Care Directive  Patient does not have a Health Care Directive or Living Will: Discussed advance care planning with patient; information given to patient to review.    HPI  Home blood pressure readings are at times over 135/85. Had a recent emergency department visit and blood pressure was elevated then. Echo in 2022 showed LVH. Gets brief upper chest \"flash\", not pain, lasting for a second. He gets this rarely, perhaps once every 2-3 months. Tolerated recent leg surgery without any symptoms. No chest pain with exertion.    Has a CPAP, uses this nightly. Is over 10 years old and needs a new prescription.     The 10-year ASCVD risk score (Cori HUNTER, et al., 2019) is: 15.1%    Values used to calculate the score:      Age: 63 years      Sex: Male      Is Non- : No      Diabetic: No      Tobacco smoker: No      Systolic Blood Pressure: 144 mmHg      Is BP treated: Yes      HDL Cholesterol: 46 mg/dL      Total Cholesterol: 163 mg/dL              3/25/2024   General " Health   How would you rate your overall physical health? Good   Feel stress (tense, anxious, or unable to sleep) Only a little   (!) STRESS CONCERN      3/25/2024   Nutrition   Diet: Regular (no restrictions)         3/25/2024   Exercise   Days per week of moderate/strenous exercise 4 days         3/25/2024   Social Factors   Frequency of gathering with friends or relatives Once a week   Worry food won't last until get money to buy more No   Food not last or not have enough money for food? No   Do you have housing?  Yes   Are you worried about losing your housing? No   Lack of transportation? No   Unable to get utilities (heat,electricity)? No         3/24/2023   Fall Risk   Fallen 2 or more times in the past year? Yes          3/25/2024   Activities of Daily Living- Home Safety   Needs help with the following daily activites None of the above   Safety concerns in the home None of the above         3/25/2024   Dental   Dentist two times every year? Yes         3/25/2024   Hearing Screening   Hearing concerns? (!) IT'S HARD TO FOLLOW A CONVERSATION IN A NOISY RESTAURANT OR CROWDED ROOM.         3/25/2024   Driving Risk Screening   Patient/family members have concerns about driving No         3/25/2024   General Alertness/Fatigue Screening   Have you been more tired than usual lately? (!) YES         3/25/2024   Urinary Incontinence Screening   Bothered by leaking urine in past 6 months No         3/25/2024   TB Screening   Were you born outside of the [place]? No         Today's PHQ-2 Score:       3/25/2024     7:52 AM   PHQ-2 ( 1999 [employer])   Q1: Little interest or pleasure in doing things 0   Q2: Feeling down, depressed or hopeless 0   PHQ-2 Score 0   Q1: Little interest or pleasure in doing things Not at all   Q2: Feeling down, depressed or hopeless Not at all   PHQ-2 Score 0           3/25/2024   Substance Use   Alcohol more than 3/day or more than 7/wk No   Do you have a current opioid prescription? No   How  severe/bad is pain from 1 to 10? 2/10   Do you use any other substances recreationally? No     Social History     Tobacco Use    Smoking status: Never    Smokeless tobacco: Never   Vaping Use    Vaping Use: Never used   Substance Use Topics    Alcohol use: Yes     Alcohol/week: 0.0 standard drinks of alcohol     Comment: 6 a year    Drug use: No             3/25/2024   One time HIV Screening   Previous HIV test? No   Last PSA:   Prostate Specific Antigen Screen   Date Value Ref Range Status   03/24/2023 1.77 0.00 - 4.50 ng/mL Final   03/10/2022 1.41 0.00 - 4.00 ug/L Final     ASCVD Risk   The 10-year ASCVD risk score (Cori HUNTER, et al., 2019) is: 15.1%    Values used to calculate the score:      Age: 63 years      Sex: Male      Is Non- : No      Diabetic: No      Tobacco smoker: No      Systolic Blood Pressure: 144 mmHg      Is BP treated: Yes      HDL Cholesterol: 46 mg/dL      Total Cholesterol: 163 mg/dL            Reviewed and updated as needed this visit by Provider                    Past Medical History:   Diagnosis Date    Complete tear of right rotator cuff     1/22/2018    Impingement syndrome of right shoulder     1/2/2018    Olecranon bursitis of right elbow     4/14/2016    Other shoulder lesions, right shoulder     1/2/2018    Other specified postprocedural states     2/8/2018    Pneumonia     2/14/2012    Primary osteoarthritis of right shoulder     1/2/2018    S/P arthroscopy of right shoulder 2/13/2018     Past Surgical History:   Procedure Laterality Date    ARTHROSCOPY SHOULDER Right 02/08/2018    AS REPAIR CRUCIATE LIGAMENT,KNEE Left     POW283,ANTERIOR CRUCIATE LIGAMENT REPAIR,Left,with graft     Current Outpatient Medications   Medication Sig Dispense Refill    acetaminophen (TYLENOL) 500 MG tablet 1 to 2 tablets every 6 hours as needed for pain.  Do not exceed 8 tablets in a 24 hour period.      amLODIPine (NORVASC) 10 MG tablet Take 1 tablet (10 mg) by mouth  "daily 90 tablet 4    atorvastatin (LIPITOR) 20 MG tablet Take 1 tablet (20 mg) by mouth daily 90 tablet 4    losartan (COZAAR) 50 MG tablet Take 1 tablet (50 mg) by mouth daily 90 tablet 4    respiratory syncytial virus vaccine, bivalent (ABRYSVO) injection Inject 0.5 mLs into the muscle once for 1 dose 1 each 0    tamsulosin (FLOMAX) 0.4 MG capsule Take 1 capsule (0.4 mg) by mouth daily 90 capsule 1     Allergies   Allergen Reactions    Morphine Nausea and Vomiting, Other (See Comments) and Headache     Current providers sharing in care for this patient include:  Patient Care Team:  Srinivasa Ambrose MD as PCP - General (Family Practice)  Srinivasa Ambrose MD as Assigned PCP    The following health maintenance items are reviewed in Epic and correct as of today:  Health Maintenance   Topic Date Due    HIV SCREENING  Never done    HEPATITIS C SCREENING  Never done    RSV VACCINE (Pregnancy & 60+) (1 - 1-dose 60+ series) Never done    COLORECTAL CANCER SCREENING  03/16/2025    MEDICARE ANNUAL WELLNESS VISIT  03/25/2025    GLUCOSE  02/02/2026    LIPID  03/24/2028    ADVANCE CARE PLANNING  03/24/2028    DTAP/TDAP/TD IMMUNIZATION (2 - Td or Tdap) 10/02/2030    PHQ-2 (once per calendar year)  Completed    INFLUENZA VACCINE  Completed    ZOSTER IMMUNIZATION  Completed    COVID-19 Vaccine  Completed    Pneumococcal Vaccine: Pediatrics (0 to 5 Years) and At-Risk Patients (6 to 64 Years)  Aged Out    IPV IMMUNIZATION  Aged Out    HPV IMMUNIZATION  Aged Out    MENINGITIS IMMUNIZATION  Aged Out    RSV MONOCLONAL ANTIBODY  Aged Out            Objective    Exam  BP (!) 144/78   Pulse 63   Temp 98.3  F (36.8  C) (Tympanic)   Resp 18   Ht 1.88 m (6' 2\")   Wt 128.9 kg (284 lb 3.2 oz)   SpO2 96%   BMI 36.49 kg/m     Estimated body mass index is 36.49 kg/m  as calculated from the following:    Height as of this encounter: 1.88 m (6' 2\").    Weight as of this encounter: 128.9 kg (284 lb 3.2 oz).    Physical Exam  GENERAL: alert and no " distress  EYES: Eyes grossly normal to inspection, PERRL and conjunctivae and sclerae normal  HENT: ear canals and TM's normal, nose and mouth without ulcers or lesions  NECK: no adenopathy, no asymmetry, masses, or scars  RESP: lungs clear to auscultation - no rales, rhonchi or wheezes  CV: regular rate and rhythm, normal S1 S2, no S3 or S4, no murmur, click or rub, no peripheral edema  ABDOMEN: soft, nontender, no hepatosplenomegaly, no masses and bowel sounds normal  MS: no gross musculoskeletal defects noted, no edema  SKIN: no suspicious lesions or rashes  NEURO: Normal strength and tone, mentation intact and speech normal  PSYCH: mentation appears normal, affect normal/bright        3/25/2024   Mini Cog   Clock Draw Score 2 Normal   3 Item Recall 2 objects recalled   Mini Cog Total Score 4              Signed Electronically by: Srinivasa Ambrose MD

## 2024-03-25 NOTE — NURSING NOTE
"Chief Complaint   Patient presents with    Physical       Initial BP (!) 144/78   Pulse 63   Temp 98.3  F (36.8  C) (Tympanic)   Resp 18   Ht 1.88 m (6' 2\")   Wt 128.9 kg (284 lb 3.2 oz)   SpO2 96%   BMI 36.49 kg/m   Estimated body mass index is 36.49 kg/m  as calculated from the following:    Height as of this encounter: 1.88 m (6' 2\").    Weight as of this encounter: 128.9 kg (284 lb 3.2 oz).  Medication Reconciliation: complete          "

## 2024-03-25 NOTE — LETTER
April 1, 2024      Denton Palacios  [address] [place] [address]        To Whom It May Concern,     Denton is a patient of mine of many years. He has no medical problems that prevent him from driving a car.  He is currently medically cleared to continue to drive.       Sincerely,        Srinivasa Ambrose MD

## 2024-03-25 NOTE — PATIENT INSTRUCTIONS
Preventive Care Advice   This is general advice given by our system to help you stay healthy. However, your care team may have specific advice just for you. Please talk to your care team about your preventive care needs.  Nutrition  Eat 5 or more servings of fruits and vegetables each day.  Try wheat bread, brown rice and whole grain pasta (instead of white bread, rice, and pasta).  Get enough calcium and vitamin D. Check the label on foods and aim for 100% of the RDA (recommended daily allowance).  Lifestyle  Exercise at least 150 minutes each week   (30 minutes a day, 5 days a week).  Do muscle strengthening activities 2 days a week. These help control your weight and prevent disease.  No smoking.  Wear sunscreen to prevent skin cancer.  Have a dental exam and cleaning every 6 months.  Yearly exams  See your health care team every year to talk about:  Any changes in your health.  Any medicines your care team has prescribed.  Preventive care, family planning, and ways to prevent chronic diseases.  Shots (vaccines)   HPV shots (up to age 26), if you've never had them before.  Hepatitis B shots (up to age 59), if you've never had them before.  COVID-19 shot: Get this shot when it's due.  Flu shot: Get a flu shot every year.  Tetanus shot: Get a tetanus shot every 10 years.  Pneumococcal, hepatitis A, and RSV shots: Ask your care team if you need these based on your risk.  Shingles shot (for age 50 and up).  General health tests  Diabetes screening:  Starting at age 35, Get screened for diabetes at least every 3 years.  If you are younger than age 35, ask your care team if you should be screened for diabetes.  Cholesterol test: At age 39, start having a cholesterol test every 5 years, or more often if advised.  Bone density scan (DEXA): At age 50, ask your care team if you should have this scan for osteoporosis (brittle bones).  Hepatitis C: Get tested at least once in your life.  STIs (sexually transmitted  infections)  Before age 24: Ask your care team if you should be screened for STIs.  After age 24: Get screened for STIs if you're at risk. You are at risk for STIs (including HIV) if:  You are sexually active with more than one person.  You don't use condoms every time.  You or a partner was diagnosed with a sexually transmitted infection.  If you are at risk for HIV, ask about PrEP medicine to prevent HIV.  Get tested for HIV at least once in your life, whether you are at risk for HIV or not.  Cancer screening tests  Cervical cancer screening: If you have a cervix, begin getting regular cervical cancer screening tests at age 21. Most people who have regular screenings with normal results can stop after age 65. Talk about this with your provider.  Breast cancer scan (mammogram): If you've ever had breasts, begin having regular mammograms starting at age 40. This is a scan to check for breast cancer.  Colon cancer screening: It is important to start screening for colon cancer at age 45.  Have a colonoscopy test every 10 years (or more often if you're at risk) Or, ask your provider about stool tests like a FIT test every year or Cologuard test every 3 years.  To learn more about your testing options, visit: https://[email or web address]/966819.pdf.  For help making a decision, visit: https://bit.ly/el26106.  Prostate cancer screening test: If you have a prostate and are age 55 to 69, ask your provider if you would benefit from a yearly prostate cancer screening test.  Lung cancer screening: If you are a current or former smoker age 50 to 80, ask your care team if ongoing lung cancer screenings are right for you.  For informational purposes only. Not to replace the advice of your health care provider. Copyright   2023 [employer]. All rights reserved. Clinically reviewed by the [employer] [facility]. MiTÃº 354029 - REV 01/24.    Hearing Loss: Care Instructions  Overview     Hearing loss is a  sudden or slow decrease in how well you hear. It can range from slight to profound. Permanent hearing loss can occur with aging. It also can happen when you are exposed long-term to loud noise. Examples include listening to loud music, riding motorcycles, or being around other loud machines.  Hearing loss can affect your work and home life. It can make you feel lonely or depressed. You may feel that you have lost your independence. But hearing aids and other devices can help you hear better and feel connected to others.  Follow-up care is a key part of your treatment and safety. Be sure to make and go to all appointments, and call your doctor if you are having problems. It's also a good idea to know your test results and keep a list of the medicines you take.  How can you care for yourself at home?  Avoid loud noises whenever possible. This helps keep your hearing from getting worse.  Always wear hearing protection around loud noises.  Wear a hearing aid as directed.  A professional can help you pick a hearing aid that will work best for you.  You can also get hearing aids over the counter for mild to moderate hearing loss.  Have hearing tests as your doctor suggests. They can show whether your hearing has changed. Your hearing aid may need to be adjusted.  Use other devices as needed. These may include:  Telephone amplifiers and hearing aids that can connect to a television, stereo, radio, or microphone.  Devices that use lights or vibrations. These alert you to the doorbell, a ringing telephone, or a baby monitor.  Television closed-captioning. This shows the words at the bottom of the screen. Most new TVs can do this.  TTY (text telephone). This lets you type messages back and forth on the telephone instead of talking or listening. These devices are also called TDD. When messages are typed on the keyboard, they are sent over the phone line to a receiving TTY. The message is shown on a monitor.  Use text  "messaging, social media, and email if it is hard for you to communicate by telephone.  Try to learn a listening technique called speechreading. It is not lipreading. You pay attention to people's gestures, expressions, posture, and tone of voice. These clues can help you understand what a person is saying. Face the person you are talking to, and have them face you. Make sure the lighting is good. You need to see the other person's face clearly.  Think about counseling if you need help to adjust to your hearing loss.  When should you call for help?  Watch closely for changes in your health, and be sure to contact your doctor if:    You think your hearing is getting worse.     You have new symptoms, such as dizziness or nausea.   Where can you learn more?  Go to https://www.[employer].net/patiented  Enter R798 in the search box to learn more about \"Hearing Loss: Care Instructions.\"  Current as of: September 27, 2023               Content Version: 14.0    4310-0085 [employer]   Care instructions adapted under license by your healthcare professional. If you have questions about a medical condition or this instruction, always ask your healthcare professional. [employer] disclaims any warranty or liability for your use of this information.      Learning About Stress  What is stress?     Stress is your body's response to a hard situation. Your body can have a physical, emotional, or mental response. Stress is a fact of life for most people, and it affects everyone differently. What causes stress for you may not be stressful for someone else.  A lot of things can cause stress. You may feel stress when you go on a job interview, take a test, or run a race. This kind of short-term stress is normal and even useful. It can help you if you need to work hard or react quickly. For example, stress can help you finish an important job on time.  Long-term stress is caused by ongoing stressful situations " or events. Examples of long-term stress include long-term health problems, ongoing problems at work, or conflicts in your family. Long-term stress can harm your health.  How does stress affect your health?  When you are stressed, your body responds as though you are in danger. It makes hormones that speed up your heart, make you breathe faster, and give you a burst of energy. This is called the fight-or-flight stress response. If the stress is over quickly, your body goes back to normal and no harm is done.  But if stress happens too often or lasts too long, it can have bad effects. Long-term stress can make you more likely to get sick, and it can make symptoms of some diseases worse. If you tense up when you are stressed, you may develop neck, shoulder, or low back pain. Stress is linked to high blood pressure and heart disease.  Stress also harms your emotional health. It can make you dixon, tense, or depressed. Your relationships may suffer, and you may not do well at work or school.  What can you do to manage stress?  You can try these things to help manage stress:   Do something active. Exercise or activity can help reduce stress. Walking is a great way to get started. Even everyday activities such as housecleaning or yard work can help.  Try yoga or oxana chi. These techniques combine exercise and meditation. You may need some training at first to learn them.  Do something you enjoy. For example, listen to music or go to a movie. Practice your hobby or do volunteer work.  Meditate. This can help you relax, because you are not worrying about what happened before or what may happen in the future.  Do guided imagery. Imagine yourself in any setting that helps you feel calm. You can use online videos, books, or a teacher to guide you.  Do breathing exercises. For example:  From a standing position, bend forward from the waist with your knees slightly bent. Let your arms dangle close to the floor.  Breathe in slowly  "and deeply as you return to a standing position. Roll up slowly and lift your head last.  Hold your breath for just a few seconds in the standing position.  Breathe out slowly and bend forward from the waist.  Let your feelings out. Talk, laugh, cry, and express anger when you need to. Talking with supportive friends or family, a counselor, or a kinza leader about your feelings is a healthy way to relieve stress. Avoid discussing your feelings with people who make you feel worse.  Write. It may help to write about things that are bothering you. This helps you find out how much stress you feel and what is causing it. When you know this, you can find better ways to cope.  What can you do to prevent stress?  You might try some of these things to help prevent stress:  Manage your time. This helps you find time to do the things you want and need to do.  Get enough sleep. Your body recovers from the stresses of the day while you are sleeping.  Get support. Your family, friends, and community can make a difference in how you experience stress.  Limit your news feed. Avoid or limit time on social media or news that may make you feel stressed.  Do something active. Exercise or activity can help reduce stress. Walking is a great way to get started.  Where can you learn more?  Go to https://www.qualifyor.net/patiented  Enter N032 in the search box to learn more about \"Learning About Stress.\"  Current as of: October 24, 2023               Content Version: 14.0    4436-6959 [employer].   Care instructions adapted under license by your healthcare professional. If you have questions about a medical condition or this instruction, always ask your healthcare professional. [employer] disclaims any warranty or liability for your use of this information.      Learning About Sleeping Well  What does sleeping well mean?     Sleeping well means getting enough sleep to feel good and stay healthy. How much sleep " is enough varies among people.  The number of hours you sleep and how you feel when you wake up are both important. If you do not feel refreshed, you probably need more sleep. Another sign of not getting enough sleep is feeling tired during the day.  Experts recommend that adults get at least 7 or more hours of sleep per day. Children and older adults need more sleep.  Why is getting enough sleep important?  Getting enough quality sleep is a basic part of good health. When your sleep suffers, your physical health, mood, and your thoughts can suffer too. You may find yourself feeling more grumpy or stressed. Not getting enough sleep also can lead to serious problems, including injury, accidents, anxiety, and depression.  What might cause poor sleeping?  Many things can cause sleep problems, including:  Changes to your sleep schedule.  Stress. Stress can be caused by fear about a single event, such as giving a speech. Or you may have ongoing stress, such as worry about work or school.  Depression, anxiety, and other mental or emotional conditions.  Changes in your sleep habits or surroundings. This includes changes that happen where you sleep, such as noise, light, or sleeping in a different bed. It also includes changes in your sleep pattern, such as having jet lag or working a late shift.  Health problems, such as pain, breathing problems, and restless legs syndrome.  Lack of regular exercise.  Using alcohol, nicotine, or caffeine before bed.  How can you help yourself?  Here are some tips that may help you sleep more soundly and wake up feeling more refreshed.  Your sleeping area   Use your bedroom only for sleeping and sex. A bit of light reading may help you fall asleep. But if it doesn't, do your reading elsewhere in the house. Try not to use your TV, computer, smartphone, or tablet while you are in bed.  Be sure your bed is big enough to stretch out comfortably, especially if you have a sleep partner.  Keep  "your bedroom quiet, dark, and cool. Use curtains, blinds, or a sleep mask to block out light. To block out noise, use earplugs, soothing music, or a \"white noise\" machine.  Your evening and bedtime routine   Create a relaxing bedtime routine. You might want to take a warm shower or bath, or listen to soothing music.  Go to bed at the same time every night. And get up at the same time every morning, even if you feel tired.  What to avoid   Limit caffeine (coffee, tea, caffeinated sodas) during the day, and don't have any for at least 6 hours before bedtime.  Avoid drinking alcohol before bedtime. Alcohol can cause you to wake up more often during the night.  Try not to smoke or use tobacco, especially in the evening. Nicotine can keep you awake.  Limit naps during the day, especially close to bedtime.  Avoid lying in bed awake for too long. If you can't fall asleep or if you wake up in the middle of the night and can't get back to sleep within about 20 minutes, get out of bed and go to another room until you feel sleepy.  Avoid taking medicine right before bed that may keep you awake or make you feel hyper or energized. Your doctor can tell you if your medicine may do this and if you can take it earlier in the day.  If you can't sleep   Imagine yourself in a peaceful, pleasant scene. Focus on the details and feelings of being in a place that is relaxing.  Get up and do a quiet or boring activity until you feel sleepy.  Avoid drinking any liquids before going to bed to help prevent waking up often to use the bathroom.  Where can you learn more?  Go to https://www.[employer].net/patiented  Enter J942 in the search box to learn more about \"Learning About Sleeping Well.\"  Current as of: July 10, 2023               Content Version: 14.0    7383-6403 [employer].   Care instructions adapted under license by your healthcare professional. If you have questions about a medical condition or this instruction, " always ask your healthcare professional. [employer] disclaims any warranty or liability for your use of this information.

## 2024-03-27 ENCOUNTER — THERAPY VISIT (OUTPATIENT)
Dept: PHYSICAL THERAPY | Facility: OTHER | Age: 64
End: 2024-03-27
Payer: MEDICARE

## 2024-03-27 DIAGNOSIS — S76.111A RUPTURE OF QUADRICEPS TENDON, RIGHT, INITIAL ENCOUNTER: Primary | ICD-10-CM

## 2024-03-27 PROCEDURE — 97110 THERAPEUTIC EXERCISES: CPT | Mod: GP,PO

## 2024-04-01 ENCOUNTER — THERAPY VISIT (OUTPATIENT)
Dept: PHYSICAL THERAPY | Facility: OTHER | Age: 64
End: 2024-04-01
Payer: MEDICARE

## 2024-04-01 DIAGNOSIS — S76.111A RUPTURE OF QUADRICEPS TENDON, RIGHT, INITIAL ENCOUNTER: Primary | ICD-10-CM

## 2024-04-01 PROCEDURE — 97110 THERAPEUTIC EXERCISES: CPT | Mod: GP,PO

## 2024-04-01 NOTE — PROGRESS NOTES
04/01/24 0810   Appointment Info   Signing clinician's name / credentials Marcus Talbot, PT   Visits Used 9   Medical Diagnosis Rupture of quadriceps tendon, right, initial encounter (S76.111A)   PT Tx Diagnosis Right knee pain, impaired range of motion, weakness, impaired gait, impaired neuromuscular control   Progress Note/Certification   Start of Care Date 02/23/24   Onset of illness/injury or Date of Surgery   (Date of surgery: 2/5/2024.  Date of injury 1/29/2024.)   Therapy Frequency 16 visits   Predicted Duration 8 weeks   Certification date from 02/23/24   Certification date to 04/19/24   Progress Note Completed Date 04/01/24   GOALS   PT Goals 2;3   PT Goal 1   Goal Identifier Right knee pain   Goal Description Patient will report right knee pain at a 1/10 or less to allow sitting and riding in a car for 3 hours without difficulty.   Rationale to maximize safety and independence with performance of ADLs and functional tasks;to maximize safety and independence with transportation;to maximize safety and independence within the community   Goal Progress Progressing towards goal.  Patient reports decreasing right knee pain   Target Date 04/19/24   PT Goal 2   Goal Identifier Right knee range of motion   Goal Description Patient will demonstrate right knee range of motion 0-120 degrees to allow sit to stand from standard height chair without difficulty.   Rationale to maximize safety and independence with performance of ADLs and functional tasks;to maximize safety and independence within the home;to maximize safety and independence within the community   Goal Progress Progressing towards goal.   Target Date 04/19/24   PT Goal 3   Goal Identifier Weakness   Goal Description Patient will improve right knee strength to a 4/5 greater greater to allow ascending/descending stairs in a reciprocal pattern without difficulty.   Rationale to maximize safety and independence with performance of ADLs and functional tasks;to  "maximize safety and independence within the home;to maximize safety and independence within the community   Goal Progress Progressing towards goal.   Target Date 05/17/24   Subjective Report   Subjective Report Patient reports continued gains with range of motion per protocol.  Patient reports he is weaned from crutches when ambulating on indoor surfaces.  He continues to use crutches when ambulating over outdoor surfaces.  He continues to wear the T scope brace when weightbearing.   Objective Measures   Objective Measures Objective Measure 1   Objective Measure 1   Objective Measure Right knee range of motion   Details 0-85 degrees   PT Modalities   PT Modalities Vasopneumatic device   Treatment Interventions (PT)   Interventions Therapeutic Procedure/Exercise;Gait Training   Therapeutic Procedure/Exercise   Therapeutic Procedures: strength, endurance, ROM, flexibility minutes (47236) 30   Ther Proc 1 - Details Right knee ROM with heel slide 2 sets of 20 reps.  Patient was able to perform right knee range of motion 0-85 degrees following appropriate protocol guidelines. Quad set x 10, 5 \" hold.   SLR 3 x 10.   HS curl with brace on with green 2x 15. Seated  Mini squat with UE support and brace from 0-40 degrees 2 x 15.  Calf raise 2 x 15.  Weight shift side to side and fwd/back on floor and foam x 10 each. Standing feet together on floor with EC 3 x 30\". Foam with normal stance and EC 3 x 30\". Side stepping at railing in lara 40 feet each way.  Patient was able to perform all exercises in a pain-free manner.  He continues to follow all restrictions for 6-9 weeks phase of the quad tendon repair protocol   Skilled Intervention Education, HEP, range of motion per protocol guidelines   Patient Response/Progress Positive response to treatment noted.   Education   Learner/Method Patient;Listening;Reading;Demonstration;Pictures/Video;No Barriers to Learning   Plan   Home program Right knee range of motion 0-30 degrees per " [facility] quadriceps tendon repair protocol.  Perform 10 reps 4 times a day or as symptoms allow.  Updated 3/1: Range of motion progression for phase 3-6 weeks.  Quad set x 10, 5-second hold.  Assisted SLR x 10.-Perform once a day or symptoms allow.  Updated 4/1: T scope brace set 0-40 degrees per protocol.  Exercises per 6-9-week phase of protocol.   Plan for next session Progress exercises and activities per [facility] quadriceps tendon repair protocol.  Modalities as indicated and per protocol guidelines.   Total Session Time   Timed Code Treatment Minutes 30   Total Treatment Time (sum of timed and untimed services) 30         PLAN  Continue therapy per current plan of care.    Beginning/End Dates of Progress Note Reporting Period:  2/23/2024 to 04/01/2024    Referring Provider:  Denton Mijares

## 2024-04-03 ENCOUNTER — THERAPY VISIT (OUTPATIENT)
Dept: PHYSICAL THERAPY | Facility: OTHER | Age: 64
End: 2024-04-03
Payer: MEDICARE

## 2024-04-03 DIAGNOSIS — S76.111A RUPTURE OF QUADRICEPS TENDON, RIGHT, INITIAL ENCOUNTER: Primary | ICD-10-CM

## 2024-04-03 PROCEDURE — 97110 THERAPEUTIC EXERCISES: CPT | Mod: GP,PO

## 2024-04-08 ENCOUNTER — THERAPY VISIT (OUTPATIENT)
Dept: PHYSICAL THERAPY | Facility: OTHER | Age: 64
End: 2024-04-08
Payer: MEDICARE

## 2024-04-08 DIAGNOSIS — S76.111A RUPTURE OF QUADRICEPS TENDON, RIGHT, INITIAL ENCOUNTER: Primary | ICD-10-CM

## 2024-04-08 PROCEDURE — 97110 THERAPEUTIC EXERCISES: CPT | Mod: GP,PO

## 2024-04-11 ENCOUNTER — THERAPY VISIT (OUTPATIENT)
Dept: PHYSICAL THERAPY | Facility: OTHER | Age: 64
End: 2024-04-11
Payer: MEDICARE

## 2024-04-11 DIAGNOSIS — S76.111A RUPTURE OF QUADRICEPS TENDON, RIGHT, INITIAL ENCOUNTER: Primary | ICD-10-CM

## 2024-04-11 PROCEDURE — 97110 THERAPEUTIC EXERCISES: CPT | Mod: GP,PO

## 2024-04-15 ENCOUNTER — THERAPY VISIT (OUTPATIENT)
Dept: PHYSICAL THERAPY | Facility: OTHER | Age: 64
End: 2024-04-15
Payer: MEDICARE

## 2024-04-15 DIAGNOSIS — S76.111A RUPTURE OF QUADRICEPS TENDON, RIGHT, INITIAL ENCOUNTER: Primary | ICD-10-CM

## 2024-04-15 PROCEDURE — 97110 THERAPEUTIC EXERCISES: CPT | Mod: GP,PO

## 2024-04-17 ENCOUNTER — THERAPY VISIT (OUTPATIENT)
Dept: PHYSICAL THERAPY | Facility: OTHER | Age: 64
End: 2024-04-17
Payer: MEDICARE

## 2024-04-17 DIAGNOSIS — S76.111A RUPTURE OF QUADRICEPS TENDON, RIGHT, INITIAL ENCOUNTER: Primary | ICD-10-CM

## 2024-04-17 PROCEDURE — 97110 THERAPEUTIC EXERCISES: CPT | Mod: GP,PO

## 2024-04-17 NOTE — PROGRESS NOTES
04/17/24 0816   Appointment Info   Signing clinician's name / credentials Marcus Perezjulienne, PT   Visits Used 14 (5 of 10 for PN)   Medical Diagnosis Rupture of quadriceps tendon, right, initial encounter (S76.111A)   PT Tx Diagnosis Right knee pain, impaired range of motion, weakness, impaired gait, impaired neuromuscular control   Progress Note/Certification   Start of Care Date 02/23/24   Onset of illness/injury or Date of Surgery   (Date of surgery: 2/5/2024.  Date of injury 1/29/2024.)   Therapy Frequency 16 visits   Predicted Duration 8 weeks   Certification date from 04/19/24   Certification date to 06/12/24   Progress Note Completed Date 04/17/24   GOALS   PT Goals 2;3   PT Goal 1   Goal Identifier Right knee pain   Goal Description Patient will report right knee pain at a 1/10 or less to allow sitting and riding in a car for 3 hours without difficulty.   Rationale to maximize safety and independence with performance of ADLs and functional tasks;to maximize safety and independence with transportation;to maximize safety and independence within the community   Goal Progress Progressing towards goal.  Patient reports decreasing right knee pain   Target Date 06/12/24   PT Goal 2   Goal Identifier Right knee range of motion   Goal Description Patient will demonstrate right knee range of motion 0-120 degrees to allow sit to stand from standard height chair without difficulty.   Rationale to maximize safety and independence with performance of ADLs and functional tasks;to maximize safety and independence within the home;to maximize safety and independence within the community   Goal Progress Progressing towards goal.   Target Date 06/12/24   PT Goal 3   Goal Identifier Weakness   Goal Description Patient will improve right knee strength to a 4/5 greater greater to allow ascending/descending stairs in a reciprocal pattern without difficulty.   Rationale to maximize safety and independence with performance of ADLs and  "functional tasks;to maximize safety and independence within the home;to maximize safety and independence within the community   Goal Progress Progressing towards goal.   Target Date 06/12/24   Subjective Report   Subjective Report Patient reports continued improvement with range of motion and strength of the right knee.  He has been walking short distances at times within his home without the knee brace.  He continues to  wear the knee brace when ambulating outdoors.   Objective Measures   Objective Measures Objective Measure 1   Objective Measure 1   Objective Measure Right knee range of motion   Details 0-100 degrees   PT Modalities   PT Modalities Vasopneumatic device   Treatment Interventions (PT)   Interventions Therapeutic Procedure/Exercise;Gait Training   Therapeutic Procedure/Exercise   Therapeutic Procedures: strength, endurance, ROM, flexibility minutes (79662) 40   Ther Proc 1 - Details Nustep x 10 min, seat 15, arms 12, level 5. Right knee ROM with heel slide 3 sets of 20 reps.   Quad set x 10, 5 \" hold.   SLR 3# 3 x 10.   HS curl with  green 2 x 20. Seated  Mini squat with UE support  2 x 15.  Calf raise 2 x 20.  Marching with pause and UE support 2 x 10B.   Weight shift side to side and fwd/back on floor-no brace x 10 each. Standing feet together on floor with EC 3 x 30\".  Walking in [place]a x 846 feet.  Side stepping with green band around ankles at railing in [place] 40 feet each way.  Patient was able to perform all exercises in a pain-free manner.  He continues to follow all restrictions 9-16 weeks phase of the quad tendon repair protocol.   Skilled Intervention Education, HEP, range of motion per protocol guidelines   Patient Response/Progress Positive response to treatment noted.   Education   Learner/Method Patient;Listening;Reading;Demonstration;Pictures/Video;No Barriers to Learning   Plan   Home program Right knee range of motion 0-30 degrees per [facility] quadriceps tendon repair protocol.  " Perform 10 reps 4 times a day or as symptoms allow.  Updated 3/1: Range of motion progression for phase 3-6 weeks.  Quad set x 10, 5-second hold.  Assisted SLR x 10.-Perform once a day or symptoms allow.  Updated 4/1: T scope brace set 0-40 degrees per protocol.  Exercises per 6-9-week phase of protocol.   Plan for next session Progress exercises and activities per [facility] quadriceps tendon repair protocol.  Modalities as indicated and per protocol guidelines.         [facility]                                                                                   OUTPATIENT PHYSICAL THERAPY    PLAN OF TREATMENT FOR OUTPATIENT REHABILITATION   Patient's Last Name, First Name, Denton Smyth YOB: 1960   Provider's Name   [facility]   Medical Record No.  [id number]     Onset Date:  (Date of surgery: 2/5/2024.  Date of injury 1/29/2024.)  Start of Care Date: 02/23/24     Medical Diagnosis:  Rupture of quadriceps tendon, right, initial encounter (S76.111A)      PT Treatment Diagnosis:  Right knee pain, impaired range of motion, weakness, impaired gait, impaired neuromuscular control Plan of Treatment  Frequency/Duration: 16 visits/ 8 weeks    Certification date from (P) 04/19/24 to (P) 06/12/24         See note for plan of treatment details and functional goals     Marcus Talbot, PT                         I CERTIFY THE NEED FOR THESE SERVICES FURNISHED UNDER        THIS PLAN OF TREATMENT AND WHILE UNDER MY CARE .             Physician Signature               Date    X_____________________________________________________                  Referring Provider:  Denton Schwartz     Initial Assessment  See Epic Evaluation- Start of Care Date: 02/23/24

## 2024-04-22 ENCOUNTER — THERAPY VISIT (OUTPATIENT)
Dept: PHYSICAL THERAPY | Facility: OTHER | Age: 64
End: 2024-04-22
Payer: MEDICARE

## 2024-04-22 DIAGNOSIS — S76.111A RUPTURE OF QUADRICEPS TENDON, RIGHT, INITIAL ENCOUNTER: Primary | ICD-10-CM

## 2024-04-22 PROCEDURE — 97110 THERAPEUTIC EXERCISES: CPT | Mod: GP,PO

## 2024-04-24 ENCOUNTER — THERAPY VISIT (OUTPATIENT)
Dept: PHYSICAL THERAPY | Facility: OTHER | Age: 64
End: 2024-04-24
Payer: MEDICARE

## 2024-04-24 DIAGNOSIS — S76.111A RUPTURE OF QUADRICEPS TENDON, RIGHT, INITIAL ENCOUNTER: Primary | ICD-10-CM

## 2024-04-24 PROCEDURE — 97110 THERAPEUTIC EXERCISES: CPT | Mod: GP,PO

## 2024-04-29 ENCOUNTER — THERAPY VISIT (OUTPATIENT)
Dept: PHYSICAL THERAPY | Facility: OTHER | Age: 64
End: 2024-04-29
Payer: MEDICARE

## 2024-04-29 DIAGNOSIS — S76.111A RUPTURE OF QUADRICEPS TENDON, RIGHT, INITIAL ENCOUNTER: Primary | ICD-10-CM

## 2024-04-29 PROCEDURE — 97110 THERAPEUTIC EXERCISES: CPT | Mod: GP,CQ

## 2024-05-01 ENCOUNTER — THERAPY VISIT (OUTPATIENT)
Dept: PHYSICAL THERAPY | Facility: OTHER | Age: 64
End: 2024-05-01
Payer: MEDICARE

## 2024-05-01 DIAGNOSIS — S76.111A RUPTURE OF QUADRICEPS TENDON, RIGHT, INITIAL ENCOUNTER: Primary | ICD-10-CM

## 2024-05-01 PROCEDURE — 97110 THERAPEUTIC EXERCISES: CPT | Mod: GP,CQ

## 2024-05-06 ENCOUNTER — THERAPY VISIT (OUTPATIENT)
Dept: PHYSICAL THERAPY | Facility: OTHER | Age: 64
End: 2024-05-06
Payer: MEDICARE

## 2024-05-06 DIAGNOSIS — S76.111A RUPTURE OF QUADRICEPS TENDON, RIGHT, INITIAL ENCOUNTER: Primary | ICD-10-CM

## 2024-05-06 PROCEDURE — 97110 THERAPEUTIC EXERCISES: CPT | Mod: GP,PO

## 2024-05-08 ENCOUNTER — THERAPY VISIT (OUTPATIENT)
Dept: PHYSICAL THERAPY | Facility: OTHER | Age: 64
End: 2024-05-08
Payer: MEDICARE

## 2024-05-08 DIAGNOSIS — S76.111A RUPTURE OF QUADRICEPS TENDON, RIGHT, INITIAL ENCOUNTER: Primary | ICD-10-CM

## 2024-05-08 PROCEDURE — 97110 THERAPEUTIC EXERCISES: CPT | Mod: GP,PO

## 2024-05-13 ENCOUNTER — THERAPY VISIT (OUTPATIENT)
Dept: PHYSICAL THERAPY | Facility: OTHER | Age: 64
End: 2024-05-13
Payer: MEDICARE

## 2024-05-13 DIAGNOSIS — S76.111A RUPTURE OF QUADRICEPS TENDON, RIGHT, INITIAL ENCOUNTER: Primary | ICD-10-CM

## 2024-05-13 PROCEDURE — 97110 THERAPEUTIC EXERCISES: CPT | Mod: GP,PO

## 2024-05-15 ENCOUNTER — THERAPY VISIT (OUTPATIENT)
Dept: PHYSICAL THERAPY | Facility: OTHER | Age: 64
End: 2024-05-15
Payer: MEDICARE

## 2024-05-15 DIAGNOSIS — S76.111A RUPTURE OF QUADRICEPS TENDON, RIGHT, INITIAL ENCOUNTER: Primary | ICD-10-CM

## 2024-05-15 PROCEDURE — 97110 THERAPEUTIC EXERCISES: CPT | Mod: GP,CQ

## 2024-05-20 ENCOUNTER — THERAPY VISIT (OUTPATIENT)
Dept: PHYSICAL THERAPY | Facility: OTHER | Age: 64
End: 2024-05-20
Payer: MEDICARE

## 2024-05-20 DIAGNOSIS — S76.111A RUPTURE OF QUADRICEPS TENDON, RIGHT, INITIAL ENCOUNTER: Primary | ICD-10-CM

## 2024-05-20 PROCEDURE — 97110 THERAPEUTIC EXERCISES: CPT | Mod: GP,PO

## 2024-05-20 NOTE — PROGRESS NOTES
05/20/24 0813   Appointment Info   Signing clinician's name / credentials Marcus Perezjulienne PT   Visits Used 23 (9 of 10 for PN)   Medical Diagnosis Rupture of quadriceps tendon, right, initial encounter (S76.111A)   PT Tx Diagnosis Right knee pain, impaired range of motion, weakness, impaired gait, impaired neuromuscular control   Progress Note/Certification   Start of Care Date 02/23/24   Onset of illness/injury or Date of Surgery   (Date of surgery: 2/5/2024.  Date of injury 1/29/2024.)   Therapy Frequency 16 visits   Predicted Duration 8 weeks   Certification date from 04/19/24   Certification date to 06/12/24   Progress Note Completed Date 05/20/24   Supervision   PT Assistant Visit Number 3   GOALS   PT Goals 2;3   PT Goal 1   Goal Identifier Right knee pain   Goal Description Patient will report right knee pain at a 1/10 or less to allow sitting and riding in a car for 3 hours without difficulty.   Rationale to maximize safety and independence with performance of ADLs and functional tasks;to maximize safety and independence with transportation;to maximize safety and independence within the community   Goal Progress Progressing towards goal.  Patient reports decreasing right knee pain   Target Date 06/12/24   PT Goal 2   Goal Identifier Right knee range of motion   Goal Description Patient will demonstrate right knee range of motion 0-120 degrees to allow sit to stand from standard height chair without difficulty.   Rationale to maximize safety and independence with performance of ADLs and functional tasks;to maximize safety and independence within the home;to maximize safety and independence within the community   Goal Progress Progressing towards goal.   Target Date 06/12/24   PT Goal 3   Goal Identifier Weakness   Goal Description Patient will improve right knee strength to a 4/5 greater greater to allow ascending/descending stairs in a reciprocal pattern without difficulty.   Rationale to maximize safety and  "independence with performance of ADLs and functional tasks;to maximize safety and independence within the home;to maximize safety and independence within the community   Goal Progress Progressing towards goal.   Target Date 06/12/24   Subjective Report   Subjective Report Patient reports he is wearing the knee brace less often.  He reports continued improvement with the right knee.  He reports the strap from the brace often irritates the distal quadriceps.  He reports his knee is feeling better when he does not wear the brace.   Objective Measures   Objective Measures Objective Measure 1   Objective Measure 1   Objective Measure Right knee range of motion   Details 0-119 degrees supine heelslide   PT Modalities   PT Modalities Vasopneumatic device   Treatment Interventions (PT)   Interventions Therapeutic Procedure/Exercise;Gait Training   Therapeutic Procedure/Exercise   Therapeutic Procedures: strength, endurance, ROM, flexibility minutes (44374) 40   Ther Proc 1 - Details Nustep x 10 min, seat 15, R arm only 12, level 5>6. Right knee ROM with heel slide 3 sets of 20 reps.   SLR 3# 3 x 12.    HS curl with  blue 3 x 25.   LAQ 2 x 20.  Mini squat with UE support  2 x 15.  Calf raise 2 x 20 and eccentric heel raises x10.  Marching with pause and UE support 2 x 20B.   Weight shift side to side and fwd/back on foam x 10 each.  Staggered stance with each LE leading x 10 while standing on foam. Standing feet together on foam with EC 3 x 30\". sidestepping over 12\" jackie while on Airex foam B 2 x10 - good control and stability.Side stepping with green band around ankles at railing in [place] 40 feet x2 each way.   Skilled Intervention Education, HEP, range of motion per protocol guidelines   Patient Response/Progress Positive response to treatment noted.   Education   Learner/Method Patient;Listening;Reading;Demonstration;Pictures/Video;No Barriers to Learning   Plan   Home program Right knee range of motion 0-30 degrees per " [facility] quadriceps tendon repair protocol.  Perform 10 reps 4 times a day or as symptoms allow.  Updated 3/1: Range of motion progression for phase 3-6 weeks.  Quad set x 10, 5-second hold.  Assisted SLR x 10.-Perform once a day or symptoms allow.  Updated 4/1: T scope brace set 0-40 degrees per protocol.  Exercises per 6-9-week phase of protocol.   Plan for next session Progress exercises and activities per [facility] quadriceps tendon repair protocol.  Modalities as indicated and per protocol guidelines.   Total Session Time   Timed Code Treatment Minutes 40   Total Treatment Time (sum of timed and untimed services) 40         PLAN  Continue therapy per current plan of care.    Beginning/End Dates of Progress Note Reporting Period:  4/17/2024 to 05/20/2024    Referring Provider:  Denton Mijares

## 2024-05-22 ENCOUNTER — THERAPY VISIT (OUTPATIENT)
Dept: PHYSICAL THERAPY | Facility: OTHER | Age: 64
End: 2024-05-22
Payer: MEDICARE

## 2024-05-22 DIAGNOSIS — S76.111A RUPTURE OF QUADRICEPS TENDON, RIGHT, INITIAL ENCOUNTER: Primary | ICD-10-CM

## 2024-05-22 PROCEDURE — 97110 THERAPEUTIC EXERCISES: CPT | Mod: GP,PO

## 2024-05-28 ENCOUNTER — THERAPY VISIT (OUTPATIENT)
Dept: PHYSICAL THERAPY | Facility: OTHER | Age: 64
End: 2024-05-28
Payer: MEDICARE

## 2024-05-28 DIAGNOSIS — S76.111A RUPTURE OF QUADRICEPS TENDON, RIGHT, INITIAL ENCOUNTER: Primary | ICD-10-CM

## 2024-05-28 PROCEDURE — 97110 THERAPEUTIC EXERCISES: CPT | Mod: GP,PO

## 2024-05-30 ENCOUNTER — THERAPY VISIT (OUTPATIENT)
Dept: PHYSICAL THERAPY | Facility: OTHER | Age: 64
End: 2024-05-30
Payer: MEDICARE

## 2024-05-30 DIAGNOSIS — S76.111A RUPTURE OF QUADRICEPS TENDON, RIGHT, INITIAL ENCOUNTER: Primary | ICD-10-CM

## 2024-05-30 PROCEDURE — 97110 THERAPEUTIC EXERCISES: CPT | Mod: GP,PO

## 2024-05-30 NOTE — PROGRESS NOTES
05/30/24 0858   Appointment Info   Signing clinician's name / credentials Marcus Anajulienne PT   Visits Used 26 (3  of 10 for PN)   Medical Diagnosis Rupture of quadriceps tendon, right, initial encounter (S76.111A)   PT Tx Diagnosis Right knee pain, impaired range of motion, weakness, impaired gait, impaired neuromuscular control   Progress Note/Certification   Start of Care Date 02/23/24   Onset of illness/injury or Date of Surgery   (Date of surgery: 2/5/2024.  Date of injury 1/29/2024.)   Therapy Frequency 16 visits   Predicted Duration 8 weeks   Certification date from 04/19/24   Certification date to 06/12/24   Progress Note Completed Date 05/20/24   Supervision   PT Assistant Visit Number 3   GOALS   PT Goals 2;3   PT Goal 1   Goal Identifier Right knee pain   Goal Description Patient will report right knee pain at a 1/10 or less to allow sitting and riding in a car for 3 hours without difficulty.   Rationale to maximize safety and independence with performance of ADLs and functional tasks;to maximize safety and independence with transportation;to maximize safety and independence within the community   Goal Progress Progressing towards goal.  Patient reports decreasing right knee pain   Target Date 06/12/24   PT Goal 2   Goal Identifier Right knee range of motion   Goal Description Patient will demonstrate right knee range of motion 0-120 degrees to allow sit to stand from standard height chair without difficulty.   Rationale to maximize safety and independence with performance of ADLs and functional tasks;to maximize safety and independence within the home;to maximize safety and independence within the community   Goal Progress Goal Met   Target Date 06/12/24   Date Met 05/30/24   PT Goal 3   Goal Identifier Weakness   Goal Description Patient will improve right knee strength to a 4/5 greater greater to allow ascending/descending stairs in a reciprocal pattern without difficulty.   Rationale to maximize safety  "and independence with performance of ADLs and functional tasks;to maximize safety and independence within the home;to maximize safety and independence within the community   Goal Progress Progressing towards goal.   Target Date 06/12/24   Subjective Report   Subjective Report Patient reports continued improvement with the right knee.  Patient states he remains active at home.  He reports he is gradually increasing his overall activity level.  He reports continued progress with strength.  Patient reports he feels ready to discharge from physical therapy and proceed with independent home program and symptom management techniques at this time.   Objective Measures   Objective Measures Objective Measure 1   Objective Measure 1   Objective Measure Right knee range of motion   Details 0-120 degrees supine heelslide   PT Modalities   PT Modalities Vasopneumatic device   Treatment Interventions (PT)   Interventions Therapeutic Procedure/Exercise;Gait Training   Therapeutic Procedure/Exercise   Therapeutic Procedures: strength, endurance, ROM, flexibility minutes (48744) 40   Ther Proc 1 - Details Nustep x 10 min, seat 14, R arm only 12, level 5>6. Right knee ROM with heel slide 3 sets of 20 reps.   SLR 3# 3 x 12.    HS curl with  blue 3 x 25.   LAQ 2 x 20.  Mini squat with UE support  2 x 15.  Calf raise 2 x 20   Marching with pause and UE support 2 x 20B.   Weight shift side to side and fwd/back on foam x 10 each.  Staggered stance with each LE leading x 10 while standing on foam. Standing feet together on foam with EC 3 x 30\". Sidestepping over 12\" jackie while on Airex foam B  x10-upper extremity support as needed. Side stepping with green band around ankles at railing in [place] 40 feet x2 each way.   Skilled Intervention Education, HEP, range of motion per protocol guidelines   Patient Response/Progress Positive response to treatment noted.   Education   Learner/Method " Patient;Listening;Reading;Demonstration;Pictures/Video;No Barriers to Learning   Plan   Home program Right knee range of motion 0-30 degrees per [facility] quadriceps tendon repair protocol.  Perform 10 reps 4 times a day or as symptoms allow.  Updated 3/1: Range of motion progression for phase 3-6 weeks.  Quad set x 10, 5-second hold.  Assisted SLR x 10.-Perform once a day or symptoms allow.  Updated 4/1: T scope brace set 0-40 degrees per protocol.  Exercises per 6-9-week phase of protocol.   Updates to plan of care Discharge from PT at this time.  Continue with home exercise program.  Patient will contact physical therapy if he has any additional questions or concerns.   Total Session Time   Timed Code Treatment Minutes 40   Total Treatment Time (sum of timed and untimed services) 40         DISCHARGE  Reason for Discharge: Patient has developed the ability to proceed with independent home program and symptom management techniques at this time.      Discharge Plan: Patient to continue home program.    Referring Provider:  Denton Mijares

## 2024-07-12 ENCOUNTER — THERAPY VISIT (OUTPATIENT)
Dept: PHYSICAL THERAPY | Facility: OTHER | Age: 64
End: 2024-07-12
Attending: STUDENT IN AN ORGANIZED HEALTH CARE EDUCATION/TRAINING PROGRAM
Payer: MEDICARE

## 2024-07-12 DIAGNOSIS — S76.111D RUPTURE QUADRICEPS TENDON, RIGHT, SUBSEQUENT ENCOUNTER: ICD-10-CM

## 2024-07-12 PROCEDURE — 97161 PT EVAL LOW COMPLEX 20 MIN: CPT | Mod: GP,PO,KX

## 2024-07-12 PROCEDURE — 97110 THERAPEUTIC EXERCISES: CPT | Mod: GP,PO,KX

## 2024-07-12 NOTE — PROGRESS NOTES
PHYSICAL THERAPY EVALUATION  Type of Visit: Evaluation              Subjective       Presenting condition or subjective complaint: Patient is a 63 year old male referred to physical therapy after a quad tendon repair on the right. He had surgery down at [facility] on 2/5/24. He most recently had a follow up and they wanted him to continue with PT for ongoing strengthening. He has continued with strengthening at home but notes that his right leg is 50% weaker than the left compared to the testing they did down at the [place]. Notes that he has no restrictions but does need more strength. He is wanting this to heal completely and isn't going to take out his motorcycle.  Date of onset: 02/05/24    Relevant medical history: Arthritis; High blood pressure   Dates & types of surgery: Repair of QTR on 2/5/24 at [place]    Prior diagnostic imaging/testing results: MRI; X-ray     Prior therapy history for the same diagnosis, illness or injury: Yes 4-6 weeks ago    Prior Level of Function  Transfers: Independent  Ambulation: Independent  ADL: Independent    Living Environment  Social support: Alone   Type of home: House   Stairs to enter the home: No       Ramp: Yes   Stairs inside the home: Yes 9 Is there a railing: Yes     Help at home: Self Cares (home health aide/personal care attendant, family, etc)  Equipment owned: Straight Cane; Walker     Employment: No    Hobbies/Interests:      Patient goals for therapy: More strength    Pain assessment: Location: Right knee /Rating: Best; 1/10, Worst; 4/10     Objective   KNEE EVALUATION  PAIN: Pain Quality: Aching and Sharp  Pain Frequency: constant  Pain is Exacerbated By: prolonged sitting, lifting, certain positions, bending  Pain is Relieved By: rest and stretch  INTEGUMENTARY (edema, incisions): WFL. Incision healing well  POSTURE: WNL  GAIT:  Weightbearing Status: WBAT  Assistive Device(s): None  Gait Deviations: No major deviations noted at this time. Wider base of support with  some minor lack of push off bilaterally  BALANCE/PROPRIOCEPTION: WNL  WEIGHTBEARING ALIGNMENT: WNL  NON-WEIGHTBEARING ALIGNMENT: WNL  ROM:   (Degrees) Left AROM Left PROM  Right AROM Right PROM   Knee Flexion 116 degrees  125 degrees    Knee Extension 0 degrees  0 degrees      STRENGTH:   Pain: - none + mild ++ moderate +++ severe  Strength Scale: 0-5/5 Left Right   Knee Flexion 5 5   Knee Extension 5 5-   Quad Set 5 5   *will note fatigue with prolonged activity. Patient is strong with static manual strength testing.  FLEXIBILITY: [facility]  SPECIAL TESTS: N/a for articular structures. Circumference measurements at superior pole of patella: R: 45.5 cm, L: 49 cm  FUNCTIONAL TESTS:  Completed lateral step up/down with no major compensation other than being slightly fatigued by end of 10 reps. Patient has been doing squats at home.   PALPATION:  No major discomfort ntoed on this date  JOINT MOBILITY: WF    Assessment & Plan   CLINICAL IMPRESSIONS  Medical Diagnosis: Rupture quadriceps tendon, right, subsequent encounter (J86.030T)    Treatment Diagnosis: Impaired mobility, decreased functional strength and endurance, impaired neuromuscular control, right knee pain   Impression/Assessment: Patient is a 63 year old male with knee complaints.  The following significant findings have been identified: Pain, Decreased ROM/flexibility, Decreased joint mobility, Impaired muscle performance, and Decreased activity tolerance. These impairments interfere with their ability to perform self care tasks, recreational activities, household chores, driving , household mobility, and community mobility as compared to previous level of function.     Clinical Decision Making (Complexity):  Clinical Presentation: Stable/Uncomplicated  Clinical Presentation Rationale: based on medical and personal factors listed in PT evaluation  Clinical Decision Making (Complexity): Low complexity    PLAN OF CARE  Treatment Interventions:  Modalities:  Cryotherapy, E-stim, Hot Pack, Ultrasound, Vasoneumatic Device  Interventions: Gait Training, Manual Therapy, Neuromuscular Re-education, Therapeutic Activity, Therapeutic Exercise, Aquatic Therapy    Long Term Goals     PT Goal 1  Goal Identifier: Sitting  Goal Description: Patient will be able to sit for longer than 3 hours and get up with no increase in knee pain in order to improve his ability to travel in a vehicle to the [place].  Rationale: to maximize safety and independence within the community  Target Date: 09/06/24  PT Goal 2  Goal Identifier: Bending  Goal Description: Patient will be able to bend/squat down to the floor to lift up an item off of the ground with no increase in knee pain in order to improve his overall function at home  Rationale: to maximize safety and independence within the home  Target Date: 09/06/24  PT Goal 3  Goal Identifier: LIfting  Goal Description: Patient will be able to lift and carry over 20 lbs with no increase in knee pain and no loss of balance in order to improve his overall function at home.  Rationale: to maximize safety and independence within the home  Target Date: 09/06/24      Frequency of Treatment: 1-2 times per week. 10 visits  Duration of Treatment: 8 weeks    Education Assessment:   Learner/Method: Patient;No Barriers to Learning    Risks and benefits of evaluation/treatment have been explained.   Patient/Family/caregiver agrees with Plan of Care.     Evaluation Time:     PT Eval, Low Complexity Minutes (31437): 25     Signing Clinician: Ar Altamirano PT        [facility]                                                                                   OUTPATIENT PHYSICAL THERAPY      PLAN OF TREATMENT FOR OUTPATIENT REHABILITATION   Patient's Last Name, First Name, RONALDODANICA  PhilipDenton YOB: 1960   Provider's Name   [facility]   Medical Record No.  [id number]     Onset Date: 02/05/24   Start of Care Date: 07/12/24     Medical Diagnosis:  Rupture quadriceps tendon, right, subsequent encounter (S76.111D)      PT Treatment Diagnosis:  Impaired mobility, decreased functional strength and endurance, impaired neuromuscular control, right knee pain Plan of Treatment  Frequency/Duration: 1-2 times per week. 10 visits/ 8 weeks    Certification date from 07/12/24 to 09/06/24         See note for plan of treatment details and functional goals     Ar Altamirano, PT                         I CERTIFY THE NEED FOR THESE SERVICES FURNISHED UNDER        THIS PLAN OF TREATMENT AND WHILE UNDER MY CARE .             Physician Signature               Date    X_____________________________________________________                  Referring Provider:   Raymond Alonzo MD     Initial Assessment  See Epic Evaluation- Start of Care Date: 07/12/24

## 2024-07-15 ENCOUNTER — THERAPY VISIT (OUTPATIENT)
Dept: PHYSICAL THERAPY | Facility: OTHER | Age: 64
End: 2024-07-15
Attending: STUDENT IN AN ORGANIZED HEALTH CARE EDUCATION/TRAINING PROGRAM
Payer: MEDICARE

## 2024-07-15 DIAGNOSIS — S76.111D RUPTURE QUADRICEPS TENDON, RIGHT, SUBSEQUENT ENCOUNTER: Primary | ICD-10-CM

## 2024-07-15 PROCEDURE — 97110 THERAPEUTIC EXERCISES: CPT | Mod: GP,PO,KX

## 2024-07-18 ENCOUNTER — THERAPY VISIT (OUTPATIENT)
Dept: PHYSICAL THERAPY | Facility: OTHER | Age: 64
End: 2024-07-18
Attending: STUDENT IN AN ORGANIZED HEALTH CARE EDUCATION/TRAINING PROGRAM
Payer: MEDICARE

## 2024-07-18 DIAGNOSIS — S76.111D RUPTURE QUADRICEPS TENDON, RIGHT, SUBSEQUENT ENCOUNTER: Primary | ICD-10-CM

## 2024-07-18 PROCEDURE — 97110 THERAPEUTIC EXERCISES: CPT | Mod: GP,CQ,KX

## 2024-07-23 ENCOUNTER — THERAPY VISIT (OUTPATIENT)
Dept: PHYSICAL THERAPY | Facility: OTHER | Age: 64
End: 2024-07-23
Attending: STUDENT IN AN ORGANIZED HEALTH CARE EDUCATION/TRAINING PROGRAM
Payer: MEDICARE

## 2024-07-23 DIAGNOSIS — S76.111D RUPTURE QUADRICEPS TENDON, RIGHT, SUBSEQUENT ENCOUNTER: Primary | ICD-10-CM

## 2024-07-23 PROCEDURE — 97110 THERAPEUTIC EXERCISES: CPT | Mod: GP,PO,KX

## 2024-07-25 ENCOUNTER — THERAPY VISIT (OUTPATIENT)
Dept: PHYSICAL THERAPY | Facility: OTHER | Age: 64
End: 2024-07-25
Attending: STUDENT IN AN ORGANIZED HEALTH CARE EDUCATION/TRAINING PROGRAM
Payer: MEDICARE

## 2024-07-25 DIAGNOSIS — S76.111D RUPTURE QUADRICEPS TENDON, RIGHT, SUBSEQUENT ENCOUNTER: Primary | ICD-10-CM

## 2024-07-25 PROCEDURE — 97110 THERAPEUTIC EXERCISES: CPT | Mod: GP,CQ

## 2024-07-29 ENCOUNTER — THERAPY VISIT (OUTPATIENT)
Dept: PHYSICAL THERAPY | Facility: OTHER | Age: 64
End: 2024-07-29
Attending: STUDENT IN AN ORGANIZED HEALTH CARE EDUCATION/TRAINING PROGRAM
Payer: MEDICARE

## 2024-07-29 DIAGNOSIS — S76.111D RUPTURE QUADRICEPS TENDON, RIGHT, SUBSEQUENT ENCOUNTER: Primary | ICD-10-CM

## 2024-07-29 PROCEDURE — 97110 THERAPEUTIC EXERCISES: CPT | Mod: GP,CQ,KX

## 2024-08-01 ENCOUNTER — THERAPY VISIT (OUTPATIENT)
Dept: PHYSICAL THERAPY | Facility: OTHER | Age: 64
End: 2024-08-01
Attending: STUDENT IN AN ORGANIZED HEALTH CARE EDUCATION/TRAINING PROGRAM
Payer: MEDICARE

## 2024-08-01 DIAGNOSIS — S76.111D RUPTURE QUADRICEPS TENDON, RIGHT, SUBSEQUENT ENCOUNTER: Primary | ICD-10-CM

## 2024-08-01 PROCEDURE — 97110 THERAPEUTIC EXERCISES: CPT | Mod: GP,CQ,KX

## 2024-08-06 ENCOUNTER — THERAPY VISIT (OUTPATIENT)
Dept: PHYSICAL THERAPY | Facility: OTHER | Age: 64
End: 2024-08-06
Attending: STUDENT IN AN ORGANIZED HEALTH CARE EDUCATION/TRAINING PROGRAM
Payer: MEDICARE

## 2024-08-06 DIAGNOSIS — S76.111D RUPTURE QUADRICEPS TENDON, RIGHT, SUBSEQUENT ENCOUNTER: Primary | ICD-10-CM

## 2024-08-06 PROCEDURE — 97110 THERAPEUTIC EXERCISES: CPT | Mod: GP,PO,KX

## 2024-08-09 ENCOUNTER — THERAPY VISIT (OUTPATIENT)
Dept: PHYSICAL THERAPY | Facility: OTHER | Age: 64
End: 2024-08-09
Attending: STUDENT IN AN ORGANIZED HEALTH CARE EDUCATION/TRAINING PROGRAM
Payer: MEDICARE

## 2024-08-09 DIAGNOSIS — S76.111D RUPTURE QUADRICEPS TENDON, RIGHT, SUBSEQUENT ENCOUNTER: Primary | ICD-10-CM

## 2024-08-09 PROCEDURE — 97110 THERAPEUTIC EXERCISES: CPT | Mod: GP,PO,KX

## 2024-08-13 ENCOUNTER — THERAPY VISIT (OUTPATIENT)
Dept: PHYSICAL THERAPY | Facility: OTHER | Age: 64
End: 2024-08-13
Attending: STUDENT IN AN ORGANIZED HEALTH CARE EDUCATION/TRAINING PROGRAM
Payer: MEDICARE

## 2024-08-13 DIAGNOSIS — S76.111D RUPTURE QUADRICEPS TENDON, RIGHT, SUBSEQUENT ENCOUNTER: Primary | ICD-10-CM

## 2024-08-13 PROCEDURE — 97110 THERAPEUTIC EXERCISES: CPT | Mod: GP,PO,KX

## 2024-08-13 NOTE — PROGRESS NOTES
08/13/24 0500   Appointment Info   Signing clinician's name / credentials Ar Altamirano DPT   Total/Authorized Visits 10/10   Visits Used 10   Medical Diagnosis Rupture quadriceps tendon, right, subsequent encounter (S76.111D)   PT Tx Diagnosis Impaired mobility, decreased functional strength and endurance, impaired neuromuscular control, right knee pain   Progress Note/Certification   Start of Care Date 07/12/24   Onset of illness/injury or Date of Surgery 02/05/24   Therapy Frequency 1-2 times per week. 10 visits   Predicted Duration 8 weeks   Certification date from 07/12/24   Certification date to 09/06/24   KX Modifier Statement Therapy services meets medical necessity requirements beyond the therapy threshold for ongoing care.   Progress Note Due Date 09/06/24   Progress Note Completed Date 07/12/24   Supervision   PT Assistant Visit Number 4       Present No   GOALS   PT Goals 2;3   PT Goal 1   Goal Identifier Sitting   Goal Description Patient will be able to sit for longer than 3 hours and get up with no increase in knee pain in order to improve his ability to travel in a vehicle to the [place].   Rationale to maximize safety and independence within the community   Goal Progress Patient reports that he was able to do this driving to the [facility] this weekend. Did get a little sore but has some pain   Target Date 09/06/24   PT Goal 2   Goal Identifier Bending   Goal Description Patient will be able to bend/squat down to the floor to lift up an item off of the ground with no increase in knee pain in order to improve his overall function at home   Rationale to maximize safety and independence within the home   Goal Progress GOAL MET   Target Date 09/06/24   Date Met 08/13/24   PT Goal 3   Goal Identifier LIfting   Goal Description Patient will be able to lift and carry over 20 lbs with no increase in knee pain and no loss of balance in order to improve his overall function at home.    Rationale to maximize safety and independence within the home   Goal Progress GOAL MET   Target Date 09/06/24   Subjective Report   Subjective Report Patient has been pleased with his recovery to this point. Saw his surgeon yesterday and patient notes that he is pleased with this as well. States that patient can transition to independent management at home now. Will hold his chart open until September 3 when he see's the surgeon again.   Objective Measures   Objective Measures Objective Measure 1;Objective Measure 2;Objective Measure 3   Objective Measure 1   Objective Measure Knee ROM   Details R: 0-124 in supine heelslide. L: 0-116   Objective Measure 2   Objective Measure Subjective pain rating   Details 0/10   Objective Measure 3   Objective Measure Circumference Measurements   Details Top of incision around quad: 7/12/24: 45.5 cms,   8/6: 47cms   Treatment Interventions (PT)   Interventions Therapeutic Procedure/Exercise   Therapeutic Procedure/Exercise   Therapeutic Procedures: strength, endurance, ROM, flexibility minutes (58306) 20   Ther Proc 1 ROM/strengthening   Ther Proc 1 - Details Upright bike for 10 minutes on level 8.  Leg press: 2x20 at 180 lbs; single leg (left w/ right used as guide) 2 x 15 at 100 lbs, hamstring curls; 2x15 at 80 lbs, knee extension: 2x15 at 60 lbs, reviewed HEP   Skilled Intervention VC/demonstration   Patient Response/Progress Good fatigue after treatment without an increase in symptoms or pain   Education   Learner/Method Patient;No Barriers to Learning   Plan   Home program LAQ: 2x10, twice daily (previously given to him at the [place]), TKE: 2x10, twice daily, lateral step downs: 2x10, twice daily, quad sets; 2x10, twice daily   Plan for next session Continue to progress strength/balance exercises as able per POC, communicate with PT as needed.   Total Session Time   Timed Code Treatment Minutes 20   Total Treatment Time (sum of timed and untimed services) 20        PLAN  Continue therapy per current plan of care.    Beginning/End Dates of Progress Note Reporting Period:  07/12/24 to 08/13/2024    Referring Provider:  No ref. provider found

## 2024-09-04 ENCOUNTER — MEDICAL CORRESPONDENCE (OUTPATIENT)
Dept: PHYSICAL THERAPY | Facility: OTHER | Age: 64
End: 2024-09-04
Payer: COMMERCIAL

## 2024-09-09 DIAGNOSIS — R39.198 DIFFICULTY URINATING: ICD-10-CM

## 2024-09-12 RX ORDER — TAMSULOSIN HYDROCHLORIDE 0.4 MG/1
0.4 CAPSULE ORAL DAILY
Qty: 90 CAPSULE | Refills: 1 | Status: SHIPPED | OUTPATIENT
Start: 2024-09-12

## 2024-09-12 NOTE — TELEPHONE ENCOUNTER
[id number] sent Rx request for the following:      Requested Prescriptions   Pending Prescriptions Disp Refills    tamsulosin (FLOMAX) 0.4 MG capsule [Pharmacy Med Name: Tamsulosin HCl 0.4 MG Oral Capsule] 90 capsule 0     Sig: Take 1 capsule by mouth once daily       Alpha Blockers Failed - 9/12/2024 10:46 AM        Failed - Blood pressure under 140/90 in past 12 months     BP Readings from Last 3 Encounters:   03/25/24 (!) 140/74   01/29/24 (!) 150/80   03/24/23 (!) 148/82   No data recorded        Failed - Medication indicated for associated diagnosis     Medication is associated with one or more of the following diagnoses:  Benign prostatic hyperplasia  Disorder of the urinary system  Erectile dysfunction  Neurogenic bladder  Overactive bladder  Raynaud s  Ureteral stent-related symptoms  Urinary retention  Posttraumatic Stress Disorder     Last Prescription Date:   3/25/24  Last Fill Qty/Refills:         90, R-1    Last Office Visit:              3/25/24 (Physical)   Future Office visit:           None    Unable to complete prescription refill per RN Medication Refill Policy. Stacie Frye RN .............. 9/12/2024  10:54 AM

## 2024-11-07 ENCOUNTER — THERAPY VISIT (OUTPATIENT)
Dept: PHYSICAL THERAPY | Facility: OTHER | Age: 64
End: 2024-11-07
Payer: COMMERCIAL

## 2024-11-07 DIAGNOSIS — Z98.890 S/P LEFT ROTATOR CUFF REPAIR: ICD-10-CM

## 2024-11-07 DIAGNOSIS — M25.512 PAIN IN LEFT SHOULDER: Primary | ICD-10-CM

## 2024-11-07 NOTE — PROGRESS NOTES
PHYSICAL THERAPY EVALUATION  Type of Visit: Evaluation       Fall Risk Screen:  Fall screen completed by: PT  Have you fallen 2 or more times in the past year?: No  Have you fallen and had an injury in the past year?: Yes  Is patient a fall risk?: Department fall risk interventions implemented; Yes    Subjective         Presenting condition or subjective complaint: (Patient-Rptd) Left shoulder surgery    Patient is status post left shoulder arthroscopic rotator cuff repair, biceps tenodesis, and extensive debridement performed by  on 9/25/2024.  Patient reports he has been following all postoperative guidelines per protocol at this time.  Patient did have a follow-up with  on 11/5/2024.  Patient was given the go ahead to begin weaning from the sling.  Patient was also instructed to begin outpatient physical therapy on a formal basis.  Patient has been performing home program that was instructed during the 0-6-week phase without difficulty.  He presents today to begin the next phase of rehabilitation per the [facility] sports and orthopedic medicine complex rotator cuff repair protocol, with the biceps tenodesis precautions.  Overall patient reports pain continues to decrease however he is having difficulty sleeping at night.  He is motivated to begin formal physical therapy working through each phase of rehabilitation.    Date of onset: 09/25/24    Relevant medical history:   Reviewed medical chart reviewed medical chart  Dates & types of surgery:      Prior diagnostic imaging/testing results: (Patient-Rptd) MRI; X-ray     Prior therapy history for the same diagnosis, illness or injury: (Patient-Rptd) No      Prior Level of Function  No prior functional rotations reported.    Living Environment  Social support: (Patient-Rptd) With a significant other or spouse   Type of home: (Patient-Rptd) House; 2-story   Stairs to enter the home: (Patient-Rptd) No       Ramp: (Patient-Rptd) Yes   Stairs  inside the home: (Patient-Rptd) Yes (Patient-Rptd) 9 Is there a railing: (Patient-Rptd) Yes     Help at home: (Patient-Rptd) Self Cares (home health aide/personal care attendant, family, etc)  Equipment owned: (Patient-Rptd) Straight Cane; Walker; Crutches; Grab bars     Employment: (Patient-Rptd) Not Applicable    Hobbies/Interests:      Patient goals for therapy: (Patient-Rptd) Move it       Objective   SHOULDER EVALUATION  PAIN: Pain Level at Rest: 0/10  Pain Level with Use: 5/10  Pain Location: Left anterior/lateral shoulder  Pain Quality: Aching, Sharp, and Stabbing  Pain Frequency: intermittent  Pain is Exacerbated By: Patient is unable to perform lifting, carrying, reaching, household tasks and work tasks with the left upper extremity due to current postoperative restrictions.  Patient is having difficulty sleeping and performing general ADLs due to current restrictions.  Pain is Relieved By: cold and rest  INTEGUMENTARY (edema, incisions):  Patient reports incision sites are healing without difficulty.  POSTURE: Rounded shoulder posture in sitting.    ROM:   Passive left shoulder flexion in supine was approximately 100 degrees.    STRENGTH: Formal strength measurements not completed secondary to current postoperative status.    SPECIAL TESTS: Special tests not indicated due to current postoperative status.    Assessment & Plan   CLINICAL IMPRESSIONS  Medical Diagnosis: S/P left rotator cuff repair (Z98.890),  Biceps tenodesis    Treatment Diagnosis: Left shoulder pain, impaired range of motion, weakness, postural dysfunction   Impression/Assessment: Patient is a 64 year old male with status post left shoulder rotator cuff repair, and biceps tenodesis complaints.  The following significant findings have been identified: Pain, Decreased ROM/flexibility, Decreased joint mobility, Decreased strength, Impaired muscle performance, Decreased activity tolerance, and Impaired posture. These impairments interfere with  their ability to perform self care tasks, work tasks, recreational activities, and household chores as compared to previous level of function.     Clinical Decision Making (Complexity):  Clinical Presentation: Stable/Uncomplicated  Clinical Presentation Rationale: based on medical and personal factors listed in PT evaluation  Clinical Decision Making (Complexity): Low complexity    PLAN OF CARE  Treatment Interventions:  Modalities: Cryotherapy, Vasoneumatic Device  Interventions: Manual Therapy, Neuromuscular Re-education, Therapeutic Exercise    Long Term Goals     PT Goal 1  Goal Identifier: Left shoulder pain  Goal Description: Patient will report the ability to sleep through the entire night without waking from left shoulder pain.  Rationale: to maximize safety and independence with performance of ADLs and functional tasks;to maximize safety and independence within the home;to maximize safety and independence with self cares  Target Date: 12/19/24  PT Goal 2  Goal Identifier: Left shoulder range of motion  Goal Description: Patient will demonstrate left shoulder flexion at 140 degrees or greater to allow reaching to an overhead shelf without difficulty.  Rationale: to maximize safety and independence with performance of ADLs and functional tasks;to maximize safety and independence within the home;to maximize safety and independence within the community  Target Date: 01/16/25  PT Goal 3  Goal Identifier: Weakness  Goal Description: Patient will demonstrate improved left shoulder strength to a 4/5 grade or higher to allow lifting a 1 pound object to an overhead shelf without difficulty or limitation.  Rationale: to maximize safety and independence with performance of ADLs and functional tasks;to maximize safety and independence within the home;to maximize safety and independence within the community  Target Date: 02/26/25      Frequency of Treatment: 12  Duration of Treatment: 12 weeks      Education Assessment:    Learner/Method: Patient;Reading;Listening;Demonstration;No Barriers to Learning;Pictures/Video    Risks and benefits of evaluation/treatment have been explained.   Patient/Family/caregiver agrees with Plan of Care.     Evaluation Time:     PT Eval, Low Complexity Minutes (12948): 15       Signing Clinician: MANUEL [facility]                                                                                   OUTPATIENT PHYSICAL THERAPY      PLAN OF TREATMENT FOR OUTPATIENT REHABILITATION   Patient's Last Name, First Name, Denton Smyth YOB: 1960   Provider's Name   [facility]   Medical Record No.  [id number]     Onset Date: 09/25/24  Start of Care Date: 11/07/24     Medical Diagnosis:  S/P left rotator cuff repair (Z98.890),  Biceps tenodesis      PT Treatment Diagnosis:  Left shoulder pain, impaired range of motion, weakness, postural dysfunction Plan of Treatment  Frequency/Duration: 12/ 12 weeks    Certification date from 11/07/24 to 01/30/25         See note for plan of treatment details and functional goals     Marcus Talbot, PT                         I CERTIFY THE NEED FOR THESE SERVICES FURNISHED UNDER        THIS PLAN OF TREATMENT AND WHILE UNDER MY CARE .             Physician Signature               Date    X_____________________________________________________                  Referring Provider:  Dr. Arun Schwartz     Initial Assessment  See Epic Evaluation- Start of Care Date: 11/07/24

## 2024-11-11 ENCOUNTER — THERAPY VISIT (OUTPATIENT)
Dept: PHYSICAL THERAPY | Facility: OTHER | Age: 64
End: 2024-11-11
Payer: COMMERCIAL

## 2024-11-11 DIAGNOSIS — Z98.890 S/P LEFT ROTATOR CUFF REPAIR: Primary | ICD-10-CM

## 2024-11-14 ENCOUNTER — THERAPY VISIT (OUTPATIENT)
Dept: PHYSICAL THERAPY | Facility: OTHER | Age: 64
End: 2024-11-14
Payer: COMMERCIAL

## 2024-11-14 DIAGNOSIS — Z98.890 S/P LEFT ROTATOR CUFF REPAIR: Primary | ICD-10-CM

## 2024-11-18 ENCOUNTER — THERAPY VISIT (OUTPATIENT)
Dept: PHYSICAL THERAPY | Facility: OTHER | Age: 64
End: 2024-11-18
Payer: MEDICARE

## 2024-11-18 DIAGNOSIS — Z98.890 S/P LEFT ROTATOR CUFF REPAIR: Primary | ICD-10-CM

## 2024-11-18 PROCEDURE — 97110 THERAPEUTIC EXERCISES: CPT | Mod: GP,CQ,KX

## 2024-11-21 ENCOUNTER — THERAPY VISIT (OUTPATIENT)
Dept: PHYSICAL THERAPY | Facility: OTHER | Age: 64
End: 2024-11-21
Payer: MEDICARE

## 2024-11-21 DIAGNOSIS — Z98.890 S/P LEFT ROTATOR CUFF REPAIR: Primary | ICD-10-CM

## 2024-11-21 PROCEDURE — 97110 THERAPEUTIC EXERCISES: CPT | Mod: GP,CQ,KX

## 2024-11-25 ENCOUNTER — THERAPY VISIT (OUTPATIENT)
Dept: PHYSICAL THERAPY | Facility: OTHER | Age: 64
End: 2024-11-25
Payer: MEDICARE

## 2024-11-25 DIAGNOSIS — Z98.890 S/P LEFT ROTATOR CUFF REPAIR: Primary | ICD-10-CM

## 2024-11-27 ENCOUNTER — THERAPY VISIT (OUTPATIENT)
Dept: PHYSICAL THERAPY | Facility: OTHER | Age: 64
End: 2024-11-27
Payer: MEDICARE

## 2024-11-27 DIAGNOSIS — Z98.890 S/P LEFT ROTATOR CUFF REPAIR: Primary | ICD-10-CM

## 2024-12-02 ENCOUNTER — THERAPY VISIT (OUTPATIENT)
Dept: PHYSICAL THERAPY | Facility: OTHER | Age: 64
End: 2024-12-02
Payer: MEDICARE

## 2024-12-02 DIAGNOSIS — Z98.890 S/P LEFT ROTATOR CUFF REPAIR: Primary | ICD-10-CM

## 2024-12-05 ENCOUNTER — THERAPY VISIT (OUTPATIENT)
Dept: PHYSICAL THERAPY | Facility: OTHER | Age: 64
End: 2024-12-05
Payer: MEDICARE

## 2024-12-05 DIAGNOSIS — Z98.890 S/P LEFT ROTATOR CUFF REPAIR: Primary | ICD-10-CM

## 2024-12-05 PROCEDURE — 97110 THERAPEUTIC EXERCISES: CPT | Mod: GP,CQ,KX

## 2024-12-09 ENCOUNTER — THERAPY VISIT (OUTPATIENT)
Dept: PHYSICAL THERAPY | Facility: OTHER | Age: 64
End: 2024-12-09
Payer: MEDICARE

## 2024-12-09 DIAGNOSIS — Z98.890 S/P LEFT ROTATOR CUFF REPAIR: Primary | ICD-10-CM

## 2024-12-09 NOTE — PROGRESS NOTES
12/09/24 0900   Appointment Info   Signing clinician's name / credentials Marcus Talbot, PT, OCS   Total/Authorized Visits 10   Visits Used 10 of 10   Medical Diagnosis S/P left rotator cuff repair (Z98.890),  Biceps tenodesis   PT Tx Diagnosis Left shoulder pain, impaired range of motion, weakness, postural dysfunction   Progress Note/Certification   Start of Care Date 11/07/24   Onset of illness/injury or Date of Surgery 09/25/24   Therapy Frequency 12   Predicted Duration 12 weeks   Certification date from 11/07/24   Certification date to 01/30/25   Supervision   PT Assistant Visit Number 3   PT Goal 1   Goal Identifier Left shoulder pain   Goal Description Patient will report the ability to sleep through the entire night without waking from left shoulder pain.   Rationale to maximize safety and independence with performance of ADLs and functional tasks;to maximize safety and independence within the home;to maximize safety and independence with self cares   Goal Progress Progressing towards goal.   Target Date 12/19/24   PT Goal 2   Goal Identifier Left shoulder range of motion   Goal Description Patient will demonstrate left shoulder flexion at 140 degrees or greater to allow reaching to an overhead shelf without difficulty.   Rationale to maximize safety and independence with performance of ADLs and functional tasks;to maximize safety and independence within the home;to maximize safety and independence within the community   Goal Progress Progressing towards goal.   Target Date 01/16/25   PT Goal 3   Goal Identifier Weakness   Goal Description Patient will demonstrate improved left shoulder strength to a 4/5 grade or higher to allow lifting a 1 pound object to an overhead shelf without difficulty or limitation.   Rationale to maximize safety and independence with performance of ADLs and functional tasks;to maximize safety and independence within the home;to maximize safety and independence within the community    Goal Progress Progressing towards goal.   Target Date 02/26/25   Subjective Report   Subjective Report Patient reports continued improvement with the left shoulder.  He reports updated home exercise program is going well.  He reports continued gains with range of motion.  He continues to follow all precautions for his current phase of rehabilitation per protocol.   Objective Measures   Objective Measures Objective Measure 1;Objective Measure 2   Objective Measure 1   Objective Measure L shoulder flexion   Details Supine wand flexion = 160 degrees.   Objective Measure 2   Objective Measure Left shoulder pain   Details 0-2/10   Vasopneumatic Device   Vasopneumatic device minutes (23770) 15   Treatment Detail Nice Machine   Vasopneumatic Pressure low   Duration 15 min   Temperature Level 5   Patient Response/Progress positive   Therapeutic Procedure/Exercise   Therapeutic Procedures: strength, endurance, ROM, flexibility minutes (52344) 30   Ther Proc 1 - Details Supine passive left shoulder range of motion into shoulder flexion with patient positioned in a reclined position.  PROM into Abd, IR, ER to neutral. Supine wand flexion x10. Seated pulleys into flexion x10. Doorway isometrics into flex, abd, IR, ER 5 second hold x5 each. Standing wall walk into flexion x10. Standing wand into flexion x10. Added: Supine active flexion starting at 90 degrees x 10 in a pain free arc of motion.-Perform 1-2 times a day or symptoms allow.   Skilled Intervention Education, HEP, range of motion,   Patient Response/Progress Patient verbalized and demonstrated understanding of current phase of rehabilitation protocol and HEP.   Education   Learner/Method Patient;Reading;Listening;Demonstration;No Barriers to Learning;Pictures/Video   Plan   Home program shoulder arm hang exercise, active assisted elbow range of motion, scapular retraction, wrist and hand range of motion.  Updated home program 12/9: Active assistive shoulder flexion  using wall walk, wand, pulleys.  Isometric: Flexion, abduction, external rotation, internal rotation x 5-10 reps, 5-second hold.  Supine active flexion starting at 90 degrees working through a pain-free arc of motion.   Plan for next session Progress exercises as symptoms allow for appropriate phase of the [facility] orthopedic and sports medicine complex rotator cuff repair protocol.  Manual therapy and modalities as indicated.   Total Session Time   Timed Code Treatment Minutes 30   Total Treatment Time (sum of timed and untimed services) 45         PLAN  Continue therapy per current plan of care.    Beginning/End Dates of Progress Note Reporting Period:    11/7/2024 to 12/09/2024    Referring Provider:  No ref. provider found

## 2024-12-11 ENCOUNTER — THERAPY VISIT (OUTPATIENT)
Dept: PHYSICAL THERAPY | Facility: OTHER | Age: 64
End: 2024-12-11
Payer: MEDICARE

## 2024-12-11 DIAGNOSIS — Z98.890 S/P LEFT ROTATOR CUFF REPAIR: Primary | ICD-10-CM

## 2024-12-11 PROCEDURE — 97110 THERAPEUTIC EXERCISES: CPT | Mod: GP,CQ,KX

## 2024-12-16 ENCOUNTER — THERAPY VISIT (OUTPATIENT)
Dept: PHYSICAL THERAPY | Facility: OTHER | Age: 64
End: 2024-12-16
Payer: MEDICARE

## 2024-12-16 DIAGNOSIS — Z98.890 S/P LEFT ROTATOR CUFF REPAIR: Primary | ICD-10-CM

## 2024-12-18 ENCOUNTER — THERAPY VISIT (OUTPATIENT)
Dept: PHYSICAL THERAPY | Facility: OTHER | Age: 64
End: 2024-12-18
Payer: MEDICARE

## 2024-12-18 DIAGNOSIS — Z98.890 S/P LEFT ROTATOR CUFF REPAIR: Primary | ICD-10-CM

## 2024-12-18 PROCEDURE — 97110 THERAPEUTIC EXERCISES: CPT | Mod: GP,CQ,KX

## 2024-12-23 ENCOUNTER — THERAPY VISIT (OUTPATIENT)
Dept: PHYSICAL THERAPY | Facility: OTHER | Age: 64
End: 2024-12-23
Payer: MEDICARE

## 2024-12-23 DIAGNOSIS — Z98.890 S/P LEFT ROTATOR CUFF REPAIR: Primary | ICD-10-CM

## 2025-01-02 NOTE — ED NOTES
Called MRI to check on status.   Was informed that they have no available times slots for today and they want to know if they can schedule it for tomorrow.   Checked with provider who will call Mag Spicer RN on 1/29/2024 at 11:22 AM     Taty Boudreaux from [facility] called to get an appt for pt. I called and lmom to call office.

## 2025-01-06 ENCOUNTER — THERAPY VISIT (OUTPATIENT)
Dept: PHYSICAL THERAPY | Facility: OTHER | Age: 65
End: 2025-01-06
Payer: MEDICARE

## 2025-01-06 DIAGNOSIS — Z98.890 S/P LEFT ROTATOR CUFF REPAIR: Primary | ICD-10-CM

## 2025-01-06 DIAGNOSIS — M25.512 PAIN IN LEFT SHOULDER: ICD-10-CM

## 2025-01-06 PROCEDURE — 97110 THERAPEUTIC EXERCISES: CPT | Mod: GP,PO,KX

## 2025-01-13 ENCOUNTER — THERAPY VISIT (OUTPATIENT)
Dept: PHYSICAL THERAPY | Facility: OTHER | Age: 65
End: 2025-01-13
Payer: MEDICARE

## 2025-01-13 DIAGNOSIS — Z98.890 S/P LEFT ROTATOR CUFF REPAIR: Primary | ICD-10-CM

## 2025-01-20 ENCOUNTER — THERAPY VISIT (OUTPATIENT)
Dept: PHYSICAL THERAPY | Facility: OTHER | Age: 65
End: 2025-01-20
Payer: MEDICARE

## 2025-01-20 DIAGNOSIS — Z98.890 S/P LEFT ROTATOR CUFF REPAIR: Primary | ICD-10-CM

## 2025-01-27 ENCOUNTER — THERAPY VISIT (OUTPATIENT)
Dept: PHYSICAL THERAPY | Facility: OTHER | Age: 65
End: 2025-01-27
Payer: MEDICARE

## 2025-01-27 DIAGNOSIS — Z98.890 S/P LEFT ROTATOR CUFF REPAIR: Primary | ICD-10-CM

## 2025-02-03 ENCOUNTER — THERAPY VISIT (OUTPATIENT)
Dept: PHYSICAL THERAPY | Facility: OTHER | Age: 65
End: 2025-02-03
Payer: MEDICARE

## 2025-02-03 DIAGNOSIS — Z98.890 S/P LEFT ROTATOR CUFF REPAIR: Primary | ICD-10-CM

## 2025-02-03 NOTE — PROGRESS NOTES
02/03/25 0901   Appointment Info   Signing clinician's name / credentials Marcus Talbot, PT, OCS   Total/Authorized Visits 18   Visits Used 8 of 10   Medical Diagnosis S/P left rotator cuff repair (Z98.890),  Biceps tenodesis   PT Tx Diagnosis Left shoulder pain, impaired range of motion, weakness, postural dysfunction   Progress Note/Certification   Start of Care Date 11/07/24   Onset of illness/injury or Date of Surgery 09/25/24   Therapy Frequency 9 visits   Predicted Duration 9 weeks   Certification date from 01/30/25   Certification date to 03/31/25   Supervision   Assistant Supervision PTA visit observed, intervention appropriate, plan of care reviewed and remains appropriate   PT Assistant Visit Number 5   PT Goal 1   Goal Identifier Left shoulder pain   Goal Description Patient will report the ability to sleep through the entire night without waking from left shoulder pain.   Rationale to maximize safety and independence with performance of ADLs and functional tasks;to maximize safety and independence within the home;to maximize safety and independence with self cares   Goal Progress Progressing towards goal.   Target Date 12/19/24   PT Goal 2   Goal Identifier Left shoulder range of motion   Goal Description Patient will demonstrate left shoulder flexion at 140 degrees or greater to allow reaching to an overhead shelf without difficulty.   Rationale to maximize safety and independence with performance of ADLs and functional tasks;to maximize safety and independence within the home;to maximize safety and independence within the community   Goal Progress Progressing towards goal.   Target Date 01/16/25   PT Goal 3   Goal Identifier Weakness   Goal Description Patient will demonstrate improved left shoulder strength to a 4/5 grade or higher to allow lifting a 1 pound object to an overhead shelf without difficulty or limitation.   Rationale to maximize safety and independence with performance of ADLs and  functional tasks;to maximize safety and independence within the home;to maximize safety and independence within the community   Goal Progress Progressing towards goal.   Target Date 02/26/25   Subjective Report   Subjective Report Patient reports continued improvement with the left shoulder overall.  Patient reports strength and function continue to improve daily.  He reports HEP is going well.   Objective Measures   Objective Measures Objective Measure 1;Objective Measure 2   Objective Measure 1   Objective Measure L shoulder flexion   Details supine wand = 170 degrees.  Standing active flexion= 150 degreees.   Objective Measure 2   Objective Measure Left shoulder pain   Details 0-2/10   Vasopneumatic Device   Treatment Detail Nice Machine   Vasopneumatic Pressure low   Duration 15 min   Temperature Level 5   Patient Response/Progress positive   Therapeutic Procedure/Exercise   Therapeutic Procedures: strength, endurance, ROM, flexibility minutes (59237) 30   Ther Proc 1 - Details UBE x 3 minutes each way.  Pulleys flexion x 2 minutes.   Supine wand flexion x10. Supine active flexion starting at 90 degrees 2# x 20 in a pain free arc of motion. Standing active left shoulder flexion to 90 degrees 1/2# x 20.  Side ER 1# x 25.   Side Abduction 1# x 25.  Row with green x 25. IR with green x 25. Bicep curl 2# x 20-perform 3 times per week.   Skilled Intervention Education, HEP, range of motion,   Patient Response/Progress Patient verbalized and demonstrated understanding of current phase of rehabilitation protocol and HEP.   Education   Learner/Method Patient;Reading;Listening;Demonstration;No Barriers to Learning;Pictures/Video   Plan   Home program shoulder arm hang exercise, active assisted elbow range of motion, scapular retraction, wrist and hand range of motion.  Updated home program 12/9: Active assistive shoulder flexion using wall walk, wand, pulleys.  Isometric: Flexion, abduction, external rotation, internal  rotation x 5-10 reps, 5-second hold.  Supine active flexion starting at 90 degrees working through a pain-free arc of motion.  Updated 12/16:Standing active left shoulder flexion to 90 degrees x 10.-Perform 1 time a day or symptoms allow.  Updated 1/6:Side ER x 10-25. Side Abduction x  10-25.- perform once a day or as symptoms allow.  Updated 1/13:Row with red x 15. IR with red x 15. perform ROM daily, and strengthening 3 days per week. Focus on low weight and high reps with strength exercises, working up to 25 reps before increasing resistance.   Plan for next session Progress exercises as symptoms allow for appropriate phase of the [facility] orthopedic and sports medicine complex rotator cuff repair protocol.  Manual therapy and modalities as indicated.   Total Session Time   Timed Code Treatment Minutes 30   Total Treatment Time (sum of timed and untimed services) 30         [facility]                                                                                   OUTPATIENT PHYSICAL THERAPY    PLAN OF TREATMENT FOR OUTPATIENT REHABILITATION   Patient's Last Name, First Name, Denton Smyth YOB: 1960   Provider's Name   [facility]   Medical Record No.  [id number]     Onset Date: 09/25/24  Start of Care Date: 11/07/24     Medical Diagnosis:  S/P left rotator cuff repair (Z98.890),  Biceps tenodesis      PT Treatment Diagnosis:  Left shoulder pain, impaired range of motion, weakness, postural dysfunction Plan of Treatment  Frequency/Duration: 9 visits/ 9 weeks    Certification date from 01/30/25 to 03/31/25         See note for plan of treatment details and functional goals     Marcus Talbot, PT                         I CERTIFY THE NEED FOR THESE SERVICES FURNISHED UNDER        THIS PLAN OF TREATMENT AND WHILE UNDER MY CARE .             Physician Signature                Date    X_____________________________________________________                  Referring Provider:  Dr. Arun Schwartz     Initial Assessment  See Epic Evaluation- Start of Care Date: 11/07/24

## 2025-02-10 ENCOUNTER — THERAPY VISIT (OUTPATIENT)
Dept: PHYSICAL THERAPY | Facility: OTHER | Age: 65
End: 2025-02-10
Payer: MEDICARE

## 2025-02-10 DIAGNOSIS — Z98.890 S/P LEFT ROTATOR CUFF REPAIR: Primary | ICD-10-CM

## 2025-02-17 ENCOUNTER — THERAPY VISIT (OUTPATIENT)
Dept: PHYSICAL THERAPY | Facility: OTHER | Age: 65
End: 2025-02-17
Payer: COMMERCIAL

## 2025-02-17 DIAGNOSIS — Z98.890 S/P LEFT ROTATOR CUFF REPAIR: Primary | ICD-10-CM

## 2025-02-24 ENCOUNTER — THERAPY VISIT (OUTPATIENT)
Dept: PHYSICAL THERAPY | Facility: OTHER | Age: 65
End: 2025-02-24
Payer: COMMERCIAL

## 2025-02-24 DIAGNOSIS — Z98.890 S/P LEFT ROTATOR CUFF REPAIR: Primary | ICD-10-CM

## 2025-03-03 ENCOUNTER — THERAPY VISIT (OUTPATIENT)
Dept: PHYSICAL THERAPY | Facility: OTHER | Age: 65
End: 2025-03-03
Payer: COMMERCIAL

## 2025-03-03 DIAGNOSIS — Z98.890 S/P LEFT ROTATOR CUFF REPAIR: Primary | ICD-10-CM

## 2025-03-09 ENCOUNTER — HEALTH MAINTENANCE LETTER (OUTPATIENT)
Age: 65
End: 2025-03-09

## 2025-03-10 ENCOUNTER — THERAPY VISIT (OUTPATIENT)
Dept: PHYSICAL THERAPY | Facility: OTHER | Age: 65
End: 2025-03-10
Payer: MEDICARE

## 2025-03-10 DIAGNOSIS — Z98.890 S/P LEFT ROTATOR CUFF REPAIR: Primary | ICD-10-CM

## 2025-03-17 ENCOUNTER — THERAPY VISIT (OUTPATIENT)
Dept: PHYSICAL THERAPY | Facility: OTHER | Age: 65
End: 2025-03-17
Payer: COMMERCIAL

## 2025-03-17 DIAGNOSIS — Z98.890 S/P LEFT ROTATOR CUFF REPAIR: Primary | ICD-10-CM

## 2025-03-17 NOTE — PROGRESS NOTES
03/17/25 0854   Appointment Info   Signing clinician's name / credentials Marcus Talbot, PT, OCS   Total/Authorized Visits 24   Visits Used 6 of 10   Medical Diagnosis S/P left rotator cuff repair (Z98.890),  Biceps tenodesis   PT Tx Diagnosis Left shoulder pain, impaired range of motion, weakness, postural dysfunction   Progress Note/Certification   Start of Care Date 11/07/24   Onset of illness/injury or Date of Surgery 09/25/24   Therapy Frequency 9 visits   Predicted Duration 9 weeks   Certification date from 01/30/25   Certification date to 03/31/25   Supervision   Assistant Supervision PTA visit observed, intervention appropriate, plan of care reviewed and remains appropriate   PT Assistant Visit Number [phone]   PT Goal 1   Goal Identifier Left shoulder pain   Goal Description Patient will report the ability to sleep through the entire night without waking from left shoulder pain.   Rationale to maximize safety and independence with performance of ADLs and functional tasks;to maximize safety and independence within the home;to maximize safety and independence with self cares   Goal Progress Goal met   Target Date 12/19/24   Date Met 03/17/25   PT Goal 2   Goal Identifier Left shoulder range of motion   Goal Description Patient will demonstrate left shoulder flexion at 140 degrees or greater to allow reaching to an overhead shelf without difficulty.   Rationale to maximize safety and independence with performance of ADLs and functional tasks;to maximize safety and independence within the home;to maximize safety and independence within the community   Goal Progress Goal met   Target Date 01/16/25   PT Goal 3   Goal Identifier Weakness   Goal Description Patient will demonstrate improved left shoulder strength to a 4/5 grade or higher to allow lifting a 1 pound object to an overhead shelf without difficulty or limitation.   Rationale to maximize safety and independence with performance of ADLs and functional tasks;to  maximize safety and independence within the home;to maximize safety and independence within the community   Goal Progress Progressing towards goal.   Target Date 03/31/25   Subjective Report   Subjective Report Patient reports continued progress with the left shoulder.   Objective Measures   Objective Measures Objective Measure 1;Objective Measure 2   Objective Measure 1   Objective Measure L shoulder flexion   Details Standing active flexion= 155 degreees.  [place]=887 degrees   Objective Measure 2   Objective Measure Left shoulder pain   Details 1-2/10   Therapeutic Procedure/Exercise   Therapeutic Procedures: strength, endurance, ROM, flexibility minutes (35235) 30   Ther Proc 1 - Details UBE x 4 minutes each way.  Pulleys flexion x 2 minutes.   Supine wand flexion x10.  Supine flexion 3# x 20. Supine protraction 3# x 20.  Standing active left shoulder flexion to 90 degrees 3# x 25.  Side ER 2# x 25.   Side Abduction 3# x 25.  Row with blue x 25. IR with green x 25. Bicep curl 3# x 20-perform 3 times per week.   Skilled Intervention Education, HEP, range of motion,   Patient Response/Progress Patient verbalized and demonstrated understanding of current phase of rehabilitation protocol and HEP.   Education   Learner/Method Patient;Reading;Listening;Demonstration;No Barriers to Learning;Pictures/Video   Plan   Home program shoulder arm hang exercise, active assisted elbow range of motion, scapular retraction, wrist and hand range of motion.  Updated home program 12/9: Active assistive shoulder flexion using wall walk, wand, pulleys.  Isometric: Flexion, abduction, external rotation, internal rotation x 5-10 reps, 5-second hold.  Supine active flexion starting at 90 degrees working through a pain-free arc of motion.  Updated 12/16:Standing active left shoulder flexion to 90 degrees x 10.-Perform 1 time a day or symptoms allow.  Updated 1/6:Side ER x 10-25. Side Abduction x  10-25.- perform once a day or as  symptoms allow.  Updated 1/13:Row with red x 15. IR with red x 15. perform ROM daily, and strengthening 3 days per week. Focus on low weight and high reps with strength exercises, working up to 25 reps before increasing resistance.  Updated 2/10: Supine protraction 10-25 reps.   Plan for next session Progress exercises as symptoms allow for appropriate phase of the [facility] orthopedic and sports medicine complex rotator cuff repair protocol.  Manual therapy and modalities as indicated.         PLAN  Continue therapy per current plan of care.    Beginning/End Dates of Progress Note Reporting Period:    2/3/2025 to 03/17/2025    Referring Provider:  Provider Not In System

## 2025-03-24 ENCOUNTER — THERAPY VISIT (OUTPATIENT)
Dept: PHYSICAL THERAPY | Facility: OTHER | Age: 65
End: 2025-03-24
Payer: MEDICARE

## 2025-03-24 DIAGNOSIS — Z98.890 S/P LEFT ROTATOR CUFF REPAIR: Primary | ICD-10-CM

## 2025-03-30 SDOH — HEALTH STABILITY: PHYSICAL HEALTH: ON AVERAGE, HOW MANY DAYS PER WEEK DO YOU ENGAGE IN MODERATE TO STRENUOUS EXERCISE (LIKE A BRISK WALK)?: 3 DAYS

## 2025-03-30 SDOH — HEALTH STABILITY: PHYSICAL HEALTH: ON AVERAGE, HOW MANY MINUTES DO YOU ENGAGE IN EXERCISE AT THIS LEVEL?: 40 MIN

## 2025-03-30 ASSESSMENT — SOCIAL DETERMINANTS OF HEALTH (SDOH): HOW OFTEN DO YOU GET TOGETHER WITH FRIENDS OR RELATIVES?: ONCE A WEEK

## 2025-03-31 ENCOUNTER — OFFICE VISIT (OUTPATIENT)
Dept: FAMILY MEDICINE | Facility: OTHER | Age: 65
End: 2025-03-31
Attending: FAMILY MEDICINE
Payer: COMMERCIAL

## 2025-03-31 ENCOUNTER — THERAPY VISIT (OUTPATIENT)
Dept: PHYSICAL THERAPY | Facility: OTHER | Age: 65
End: 2025-03-31
Payer: MEDICARE

## 2025-03-31 VITALS
HEART RATE: 58 BPM | BODY MASS INDEX: 38.91 KG/M2 | HEIGHT: 74 IN | DIASTOLIC BLOOD PRESSURE: 78 MMHG | OXYGEN SATURATION: 95 % | SYSTOLIC BLOOD PRESSURE: 132 MMHG | RESPIRATION RATE: 16 BRPM | WEIGHT: 303.2 LBS | TEMPERATURE: 97.6 F

## 2025-03-31 DIAGNOSIS — G47.33 OBSTRUCTIVE SLEEP APNEA SYNDROME: ICD-10-CM

## 2025-03-31 DIAGNOSIS — E66.01 MORBID OBESITY (H): ICD-10-CM

## 2025-03-31 DIAGNOSIS — I10 ESSENTIAL HYPERTENSION: ICD-10-CM

## 2025-03-31 DIAGNOSIS — Z12.11 COLON CANCER SCREENING: ICD-10-CM

## 2025-03-31 DIAGNOSIS — E78.00 HYPERCHOLESTEROLEMIA: ICD-10-CM

## 2025-03-31 DIAGNOSIS — Z98.890 S/P LEFT ROTATOR CUFF REPAIR: Primary | ICD-10-CM

## 2025-03-31 DIAGNOSIS — Z29.11 NEED FOR VACCINATION AGAINST RESPIRATORY SYNCYTIAL VIRUS: ICD-10-CM

## 2025-03-31 DIAGNOSIS — Z00.00 ENCOUNTER FOR MEDICARE ANNUAL WELLNESS EXAM: Primary | ICD-10-CM

## 2025-03-31 DIAGNOSIS — Z12.5 SCREENING FOR PROSTATE CANCER: ICD-10-CM

## 2025-03-31 DIAGNOSIS — R39.198 DIFFICULTY URINATING: ICD-10-CM

## 2025-03-31 LAB
ANION GAP SERPL CALCULATED.3IONS-SCNC: 13 MMOL/L (ref 7–15)
BUN SERPL-MCNC: 26.2 MG/DL (ref 8–23)
CALCIUM SERPL-MCNC: 9.9 MG/DL (ref 8.8–10.4)
CHLORIDE SERPL-SCNC: 110 MMOL/L (ref 98–107)
CHOLEST SERPL-MCNC: 109 MG/DL
CREAT SERPL-MCNC: 1.16 MG/DL (ref 0.67–1.17)
EGFRCR SERPLBLD CKD-EPI 2021: 70 ML/MIN/1.73M2
FASTING STATUS PATIENT QL REPORTED: YES
FASTING STATUS PATIENT QL REPORTED: YES
GLUCOSE SERPL-MCNC: 111 MG/DL (ref 70–99)
HCO3 SERPL-SCNC: 22 MMOL/L (ref 22–29)
HDLC SERPL-MCNC: 40 MG/DL
LDLC SERPL CALC-MCNC: 51 MG/DL
NONHDLC SERPL-MCNC: 69 MG/DL
POTASSIUM SERPL-SCNC: 4.7 MMOL/L (ref 3.4–5.3)
PSA SERPL DL<=0.01 NG/ML-MCNC: 2.56 NG/ML (ref 0–4.5)
SODIUM SERPL-SCNC: 145 MMOL/L (ref 135–145)
TRIGL SERPL-MCNC: 91 MG/DL

## 2025-03-31 PROCEDURE — 1125F AMNT PAIN NOTED PAIN PRSNT: CPT | Performed by: FAMILY MEDICINE

## 2025-03-31 PROCEDURE — 99214 OFFICE O/P EST MOD 30 MIN: CPT | Mod: 25 | Performed by: FAMILY MEDICINE

## 2025-03-31 PROCEDURE — G0439 PPPS, SUBSEQ VISIT: HCPCS | Performed by: FAMILY MEDICINE

## 2025-03-31 PROCEDURE — 82310 ASSAY OF CALCIUM: CPT | Mod: ZL | Performed by: FAMILY MEDICINE

## 2025-03-31 PROCEDURE — G0103 PSA SCREENING: HCPCS | Mod: ZL | Performed by: FAMILY MEDICINE

## 2025-03-31 PROCEDURE — 3075F SYST BP GE 130 - 139MM HG: CPT | Performed by: FAMILY MEDICINE

## 2025-03-31 PROCEDURE — 84295 ASSAY OF SERUM SODIUM: CPT | Mod: ZL | Performed by: FAMILY MEDICINE

## 2025-03-31 PROCEDURE — 3078F DIAST BP <80 MM HG: CPT | Performed by: FAMILY MEDICINE

## 2025-03-31 PROCEDURE — 82465 ASSAY BLD/SERUM CHOLESTEROL: CPT | Mod: ZL | Performed by: FAMILY MEDICINE

## 2025-03-31 PROCEDURE — G0463 HOSPITAL OUTPT CLINIC VISIT: HCPCS

## 2025-03-31 PROCEDURE — G2211 COMPLEX E/M VISIT ADD ON: HCPCS | Performed by: FAMILY MEDICINE

## 2025-03-31 PROCEDURE — 36415 COLL VENOUS BLD VENIPUNCTURE: CPT | Mod: ZL | Performed by: FAMILY MEDICINE

## 2025-03-31 RX ORDER — TAMSULOSIN HYDROCHLORIDE 0.4 MG/1
0.4 CAPSULE ORAL DAILY
Qty: 90 CAPSULE | Refills: 4 | Status: SHIPPED | OUTPATIENT
Start: 2025-03-31

## 2025-03-31 RX ORDER — AMLODIPINE BESYLATE 10 MG/1
10 TABLET ORAL DAILY
Qty: 90 TABLET | Refills: 4 | Status: SHIPPED | OUTPATIENT
Start: 2025-03-31

## 2025-03-31 RX ORDER — ATORVASTATIN CALCIUM 20 MG/1
20 TABLET, FILM COATED ORAL DAILY
Qty: 90 TABLET | Refills: 4 | Status: SHIPPED | OUTPATIENT
Start: 2025-03-31

## 2025-03-31 RX ORDER — LOSARTAN POTASSIUM 50 MG/1
50 TABLET ORAL DAILY
Qty: 90 TABLET | Refills: 4 | Status: SHIPPED | OUTPATIENT
Start: 2025-03-31

## 2025-03-31 ASSESSMENT — PAIN SCALES - GENERAL: PAINLEVEL_OUTOF10: MILD PAIN (2)

## 2025-03-31 NOTE — NURSING NOTE
"Chief Complaint   Patient presents with    Medicare Visit       Initial /78   Pulse 58   Temp 97.6  F (36.4  C) (Temporal)   Resp 16   Ht 1.88 m (6' 2\")   Wt (!) 137.5 kg (303 lb 3.2 oz)   SpO2 95%   BMI 38.93 kg/m   Estimated body mass index is 38.93 kg/m  as calculated from the following:    Height as of this encounter: 1.88 m (6' 2\").    Weight as of this encounter: 137.5 kg (303 lb 3.2 oz).  Medication Reconciliation: complete          "

## 2025-03-31 NOTE — PATIENT INSTRUCTIONS
Patient Education   Preventive Care Advice   This is general advice given by our system to help you stay healthy. However, your care team may have specific advice just for you. Please talk to your care team about your preventive care needs.  Nutrition  Eat 5 or more servings of fruits and vegetables each day.  Try wheat bread, brown rice and whole grain pasta (instead of white bread, rice, and pasta).  Get enough calcium and vitamin D. Check the label on foods and aim for 100% of the RDA (recommended daily allowance).  Lifestyle  Exercise at least 150 minutes each week  (30 minutes a day, 5 days a week).  Do muscle strengthening activities 2 days a week. These help control your weight and prevent disease.  No smoking.  Wear sunscreen to prevent skin cancer.  Have a dental exam and cleaning every 6 months.  Yearly exams  See your health care team every year to talk about:  Any changes in your health.  Any medicines your care team has prescribed.  Preventive care, family planning, and ways to prevent chronic diseases.  Shots (vaccines)   HPV shots (up to age 26), if you've never had them before.  Hepatitis B shots (up to age 59), if you've never had them before.  COVID-19 shot: Get this shot when it's due.  Flu shot: Get a flu shot every year.  Tetanus shot: Get a tetanus shot every 10 years.  Pneumococcal, hepatitis A, and RSV shots: Ask your care team if you need these based on your risk.  Shingles shot (for age 50 and up)  General health tests  Diabetes screening:  Starting at age 35, Get screened for diabetes at least every 3 years.  If you are younger than age 35, ask your care team if you should be screened for diabetes.  Cholesterol test: At age 39, start having a cholesterol test every 5 years, or more often if advised.  Bone density scan (DEXA): At age 50, ask your care team if you should have this scan for osteoporosis (brittle bones).  Hepatitis C: Get tested at least once in your life.  STIs (sexually  transmitted infections)  Before age 24: Ask your care team if you should be screened for STIs.  After age 24: Get screened for STIs if you're at risk. You are at risk for STIs (including HIV) if:  You are sexually active with more than one person.  You don't use condoms every time.  You or a partner was diagnosed with a sexually transmitted infection.  If you are at risk for HIV, ask about PrEP medicine to prevent HIV.  Get tested for HIV at least once in your life, whether you are at risk for HIV or not.  Cancer screening tests  Cervical cancer screening: If you have a cervix, begin getting regular cervical cancer screening tests starting at age 21.  Breast cancer scan (mammogram): If you've ever had breasts, begin having regular mammograms starting at age 40. This is a scan to check for breast cancer.  Colon cancer screening: It is important to start screening for colon cancer at age 45.  Have a colonoscopy test every 10 years (or more often if you're at risk) Or, ask your provider about stool tests like a FIT test every year or Cologuard test every 3 years.  To learn more about your testing options, visit:   .  For help making a decision, visit:   https://bit.ly/tk45628.  Prostate cancer screening test: If you have a prostate, ask your care team if a prostate cancer screening test (PSA) at age 55 is right for you.  Lung cancer screening: If you are a current or former smoker ages 50 to 80, ask your care team if ongoing lung cancer screenings are right for you.  For informational purposes only. Not to replace the advice of your health care provider. Copyright   2023 [facility]. All rights reserved. Clinically reviewed by the [facility] Transitions Program. [employer] [id number] - REV 01/24.  Preventing Falls: Care Instructions  Injuries and health problems such as trouble walking or poor eyesight can increase your risk of falling. So can some medicines. But there are things you can do to help  "prevent falls. You can exercise to get stronger. You can also arrange your home to make it safer.    Talk to your doctor about the medicines you take. Ask if any of them increase the risk of falls and whether they can be changed or stopped.   Try to exercise regularly. It can help improve your strength and balance. This can help lower your risk of falling.         Practice fall safety and prevention.   Wear low-heeled shoes that fit well and give your feet good support. Talk to your doctor if you have foot problems that make this hard.  Carry a cellphone or wear a medical alert device that you can use to call for help.  Use stepladders instead of chairs to reach high objects. Don't climb if you're at risk for falls. Ask for help, if needed.  Wear the correct eyeglasses, if you need them.        Make your home safer.   Remove rugs, cords, clutter, and furniture from walkways.  Keep your house well lit. Use night-lights in hallways and bathrooms.  Install and use sturdy handrails on stairways.  Wear nonskid footwear, even inside. Don't walk barefoot or in socks without shoes.        Be safe outside.   Use handrails, curb cuts, and ramps whenever possible.  Keep your hands free by using a shoulder bag or backpack.  Try to walk in well-lit areas. Watch out for uneven ground, changes in pavement, and debris.  Be careful in the winter. Walk on the grass or gravel when sidewalks are slippery. Use de-icer on steps and walkways. Add non-slip devices to shoes.    Put grab bars and nonskid mats in your shower or tub and near the toilet. Try to use a shower chair or bath bench when bathing.   Get into a tub or shower by putting in your weaker leg first. Get out with your strong side first. Have a phone or medical alert device in the bathroom with you.   Where can you learn more?  Go to https://www.[employer].net/patiented  Enter G117 in the search box to learn more about \"Preventing Falls: Care Instructions.\"  Current as of: " July 31, 2024  Content Version: 14.4    1338-8778 [employer].   Care instructions adapted under license by your healthcare professional. If you have questions about a medical condition or this instruction, always ask your healthcare professional. [employer] disclaims any warranty or liability for your use of this information.    Hearing Loss: Care Instructions  Overview     Hearing loss is a sudden or slow decrease in how well you hear. It can range from slight to profound. Permanent hearing loss can occur with aging. It also can happen when you are exposed long-term to loud noise. Examples include listening to loud music, riding motorcycles, or being around other loud machines.  Hearing loss can affect your work and home life. It can make you feel lonely or depressed. You may feel that you have lost your independence. But hearing aids and other devices can help you hear better and feel connected to others.  Follow-up care is a key part of your treatment and safety. Be sure to make and go to all appointments, and call your doctor if you are having problems. It's also a good idea to know your test results and keep a list of the medicines you take.  How can you care for yourself at home?  Avoid loud noises whenever possible. This helps keep your hearing from getting worse.  Always wear hearing protection around loud noises.  Wear a hearing aid as directed.  A professional can help you pick a hearing aid that will work best for you.  You can also get hearing aids over the counter for mild to moderate hearing loss.  Have hearing tests as your doctor suggests. They can show whether your hearing has changed. Your hearing aid may need to be adjusted.  Use other devices as needed. These may include:  Telephone amplifiers and hearing aids that can connect to a television, stereo, radio, or microphone.  Devices that use lights or vibrations. These alert you to the doorbell, a ringing telephone, or a  "baby monitor.  Television closed-captioning. This shows the words at the bottom of the screen. Most new TVs can do this.  TTY (text telephone). This lets you type messages back and forth on the telephone instead of talking or listening. These devices are also called TDD. When messages are typed on the keyboard, they are sent over the phone line to a receiving TTY. The message is shown on a monitor.  Use text messaging, social media, and email if it is hard for you to communicate by telephone.  Try to learn a listening technique called speechreading. It is not lipreading. You pay attention to people's gestures, expressions, posture, and tone of voice. These clues can help you understand what a person is saying. Face the person you are talking to, and have them face you. Make sure the lighting is good. You need to see the other person's face clearly.  Think about counseling if you need help to adjust to your hearing loss.  When should you call for help?  Watch closely for changes in your health, and be sure to contact your doctor if:    You think your hearing is getting worse.     You have new symptoms, such as dizziness or nausea.   Where can you learn more?  Go to https://www.[employer].net/patiented  Enter R798 in the search box to learn more about \"Hearing Loss: Care Instructions.\"  Current as of: October 27, 2024  Content Version: 14.4    1867-3263 [employer].   Care instructions adapted under license by your healthcare professional. If you have questions about a medical condition or this instruction, always ask your healthcare professional. [employer] disclaims any warranty or liability for your use of this information.       "

## 2025-03-31 NOTE — PROGRESS NOTES
"Preventive Care Visit  [facility] AND [facility]  Srinivasa Ambrose MD, Family Medicine  Mar 31, 2025      Assessment & Plan     (Z00.00) Encounter for Medicare annual wellness exam  (primary encounter diagnosis)  Comment: see below  Plan:      (I10) Essential hypertension  Comment: is now at goal  Plan: amLODIPine (NORVASC) 10 MG tablet, losartan         (COZAAR) 50 MG tablet, BASIC METABOLIC PANEL        Refilled without change    (E78.00) Hypercholesterolemia  Comment: doing well  Plan: atorvastatin (LIPITOR) 20 MG tablet, Lipid         Profile        Refilled     (R39.198) Difficulty urinating  Comment: slightly worsened  Plan: tamsulosin (FLOMAX) 0.4 MG capsule        Refilled without changes, but if he wants we can double it for a few months and get back to me if he wants to make this change.     (Z29.11) Need for vaccination against respiratory syncytial virus  Comment: at pharm  Plan: RSV vaccine, bivalent, ABRYSVO, injection             (E66.01) Morbid obesity (H)  Comment: worsened.   Plan: discussed with him diet and activity, also touched on GLP1 meds. He is not interested in them now    (G47.33) Obstructive sleep apnea syndrome  Comment:    Plan:  on CPAP, no need for new machine    (Z12.5) Screening for prostate cancer  Comment:    Plan: PSA Screen              (Z12.11) Colon cancer screening  Comment:    Plan: COLOGUARD([employer])             The longitudinal plan of care for the diagnosis(es)/condition(s) as documented were addressed during this visit. Due to the added complexity in care, I will continue to support [place] in the subsequent management and with ongoing continuity of care.            BMI  Estimated body mass index is 38.93 kg/m  as calculated from the following:    Height as of this encounter: 1.88 m (6' 2\").    Weight as of this encounter: 137.5 kg (303 lb 3.2 oz).       Counseling  Appropriate preventive services were addressed with this patient via screening, questionnaire, " or discussion as appropriate for fall prevention, nutrition, physical activity, Tobacco-use cessation, social engagement, weight loss and cognition.  Checklist reviewing preventive services available has been given to the patient.  Reviewed patient's diet, addressing concerns and/or questions.   He is at risk for lack of exercise and has been provided with information to increase physical activity for the benefit of his well-being.   The patient was provided with written information regarding signs of hearing loss.           No follow-ups on file.    Wesley Chawla is a 64 year old, presenting for the following:  Medicare Visit        3/31/2025    10:11 AM   Additional Questions   Roomed by SADAF Pillai   Accompanied by Self         3/31/2025    10:11 AM   Patient Reported Additional Medications   Patient reports taking the following new medications N/A           History of Present Illness       Reason for visit:  Yearly   He is taking medications regularly.    He has sleep apnea, on CPAP for 7 years. New machine 1 month ago. Had to sleep in a chair for a 3 months due to a knee injury and did not need it while sleeping in a chair. Has gained significant weight with inactivity over the past year, is up 18# on our notes, but he has lost about 20# already in the last month on a keto diet.     Needs refills on his meds. Has hypertension and BPH. Tolerating meds well.          Advance Care Planning  Patient does not have a Health Care Directive: Patient states has Advance Directive and will bring in a copy to clinic.      3/30/2025   General Health   How would you rate your overall physical health? Good   Feel stress (tense, anxious, or unable to sleep) Only a little   (!) STRESS CONCERN      3/30/2025   Nutrition   Diet: Regular (no restrictions)         3/30/2025   Exercise   Days per week of moderate/strenous exercise 3 days   Average minutes spent exercising at this level 40 min         3/30/2025   Social Factors    Frequency of gathering with friends or relatives Once a week   Worry food won't last until get money to buy more No   Food not last or not have enough money for food? No   Do you have housing? (Housing is defined as stable permanent housing and does not include staying ouside in a car, in a tent, in an abandoned building, in an overnight shelter, or couch-surfing.) Yes   Are you worried about losing your housing? No   Lack of transportation? No   Unable to get utilities (heat,electricity)? No         3/31/2025   Fall Risk   Fallen 2 or more times in the past year? Yes   Trouble with walking or balance? No   Reason Gait Speed Test Not Completed Patient declines   Reason for decline Patient fell once          3/30/2025   Activities of Daily Living- Home Safety   Needs help with the following daily activites None of the above   Safety concerns in the home None of the above         3/30/2025   Dental   Dentist two times every year? Yes         3/30/2025   Hearing Screening   Hearing concerns? (!) IT'S HARD TO FOLLOW A CONVERSATION IN A NOISY RESTAURANT OR CROWDED ROOM.    (!) TROUBLE UNDERSTANDING SOFT OR WHISPERED SPEECH.       Multiple values from one day are sorted in reverse-chronological order         3/30/2025   Driving Risk Screening   Patient/family members have concerns about driving No         3/30/2025   General Alertness/Fatigue Screening   Have you been more tired than usual lately? No         3/30/2025   Urinary Incontinence Screening   Bothered by leaking urine in past 6 months No           3/25/2024   TB Screening   Were you born outside of the [place]? No           Today's PHQ-2 Score:       3/31/2025    10:03 AM   PHQ-2 ( 1999 [employer])   Q1: Little interest or pleasure in doing things 0    Q2: Feeling down, depressed or hopeless 0    PHQ-2 Score 0    Q1: Little interest or pleasure in doing things Not at all   Q2: Feeling down, depressed or hopeless Not at all   PHQ-2 Score 0       Proxy-reported            3/30/2025   Substance Use   Alcohol more than 3/day or more than 7/wk No   Do you have a current opioid prescription? No   How severe/bad is pain from 1 to 10? 2/10   Do you use any other substances recreationally? No     Social History     Tobacco Use    Smoking status: Never    Smokeless tobacco: Never   Vaping Use    Vaping status: Never Used   Substance Use Topics    Alcohol use: Yes     Alcohol/week: 0.0 standard drinks of alcohol     Comment: 6 a year    Drug use: No           3/30/2025   AAA Screening   Family history of Abdominal Aortic Aneurysm (AAA)? Unsure   Last PSA:   Prostate Specific Antigen Screen   Date Value Ref Range Status   03/25/2024 2.47 0.00 - 4.50 ng/mL Final   03/10/2022 1.41 0.00 - 4.00 ug/L Final     ASCVD Risk   The 10-year ASCVD risk score (Cori HUNTER, et al., 2019) is: 14.2%    Values used to calculate the score:      Age: 64 years      Sex: Male      Is Non- : No      Diabetic: No      Tobacco smoker: No      Systolic Blood Pressure: 132 mmHg      Is BP treated: Yes      HDL Cholesterol: 46 mg/dL      Total Cholesterol: 163 mg/dL            Reviewed and updated as needed this visit by Provider                    Past Medical History:   Diagnosis Date    Complete tear of right rotator cuff     1/22/2018    Impingement syndrome of right shoulder     1/2/2018    Olecranon bursitis of right elbow     4/14/2016    Other shoulder lesions, right shoulder     1/2/2018    Other specified postprocedural states     2/8/2018    Pneumonia     2/14/2012    Primary osteoarthritis of right shoulder     1/2/2018    S/P arthroscopy of right shoulder 2/13/2018     Past Surgical History:   Procedure Laterality Date    ARTHROSCOPY SHOULDER Right 02/08/2018    AS REPAIR CRUCIATE LIGAMENT,KNEE Left     SHQ621,ANTERIOR CRUCIATE LIGAMENT REPAIR,Left,with graft    quadracep repair Right 03/2024    ROTATOR CUFF REPAIR RT/LT Left 10/2024     Current Outpatient Medications  "  Medication Sig Dispense Refill    acetaminophen (TYLENOL) 500 MG tablet 1 to 2 tablets every 6 hours as needed for pain.  Do not exceed 8 tablets in a 24 hour period.      amLODIPine (NORVASC) 10 MG tablet Take 1 tablet (10 mg) by mouth daily. 90 tablet 4    atorvastatin (LIPITOR) 20 MG tablet Take 1 tablet (20 mg) by mouth daily. 90 tablet 4    losartan (COZAAR) 50 MG tablet Take 1 tablet (50 mg) by mouth daily. 90 tablet 4    RSV vaccine, bivalent, ABRYSVO, injection Inject 0.5 mLs into the muscle once for 1 dose. Pharmacist administered 0.5 mL 0    tamsulosin (FLOMAX) 0.4 MG capsule Take 1 capsule (0.4 mg) by mouth daily. 90 capsule 4     Allergies   Allergen Reactions    Morphine Nausea and Vomiting, Other (See Comments) and Headache     Current providers sharing in care for this patient include:  Patient Care Team:  Srinivasa Ambrose MD as PCP - General (Family Practice)  Srinivasa Ambrose MD as Assigned PCP    The following health maintenance items are reviewed in Epic and correct as of today:  Health Maintenance   Topic Date Due    RSV VACCINE (1 - Risk 60-74 years 1-dose series) Never done    LIPID  03/24/2024    COVID-19 Vaccine (7 - 2024-25 season) 03/10/2025    COLORECTAL CANCER SCREENING  03/16/2025    BMP  03/25/2025    MEDICARE ANNUAL WELLNESS VISIT  03/25/2025    FALL RISK ASSESSMENT  03/31/2026    DIABETES SCREENING  03/25/2027    ADVANCE CARE PLANNING  03/25/2029    DTAP/TDAP/TD IMMUNIZATION (2 - Td or Tdap) 10/02/2030    HEPATITIS C SCREENING  Completed    HIV SCREENING  Completed    PHQ-2 (once per calendar year)  Completed    INFLUENZA VACCINE  Completed    Pneumococcal Vaccine: 50+ Years  Completed    ZOSTER IMMUNIZATION  Completed    HPV IMMUNIZATION  Aged Out    MENINGITIS IMMUNIZATION  Aged Out            Objective    Exam  /78   Pulse 58   Temp 97.6  F (36.4  C) (Temporal)   Resp 16   Ht 1.88 m (6' 2\")   Wt (!) 137.5 kg (303 lb 3.2 oz)   SpO2 95%   BMI 38.93 kg/m     Estimated body " "mass index is 38.93 kg/m  as calculated from the following:    Height as of this encounter: 1.88 m (6' 2\").    Weight as of this encounter: 137.5 kg (303 lb 3.2 oz).    Physical Exam  GENERAL: alert and no distress  EYES: Eyes grossly normal to inspection, PERRL and conjunctivae and sclerae normal  HENT: ear canals and TM's normal, nose and mouth without ulcers or lesions  NECK: no adenopathy, no asymmetry, masses, or scars  RESP: lungs clear to auscultation - no rales, rhonchi or wheezes  CV: regular rate and rhythm, normal S1 S2, no S3 or S4, no murmur, click or rub, no peripheral edema  ABDOMEN: soft, nontender, no hepatosplenomegaly, no masses and bowel sounds normal  MS: no gross musculoskeletal defects noted, no edema  SKIN: no suspicious lesions or rashes  NEURO: Normal strength and tone, mentation intact and speech normal  PSYCH: mentation appears normal, affect normal/bright        3/31/2025   Mini Cog   Clock Draw Score 2 Normal   3 Item Recall 2 objects recalled   Mini Cog Total Score 4              Signed Electronically by: Srinivasa Ambrose MD    "

## 2025-04-01 NOTE — PROGRESS NOTES
03/31/25 0859   Appointment Info   Signing clinician's name / credentials Marcus Talbot, PT, OCS   Total/Authorized Visits 26   Visits Used 8 of 10   Medical Diagnosis S/P left rotator cuff repair (Z98.890),  Biceps tenodesis   PT Tx Diagnosis Left shoulder pain, impaired range of motion, weakness, postural dysfunction   Progress Note/Certification   Start of Care Date 11/07/24   Onset of illness/injury or Date of Surgery 09/25/24   Therapy Frequency 9 visits   Predicted Duration 9 weeks   Certification date from 01/30/25   Certification date to 03/31/25   Supervision   Assistant Supervision PTA visit observed, intervention appropriate, plan of care reviewed and remains appropriate   PT Assistant Visit Number [phone]   PT Goal 1   Goal Identifier Left shoulder pain   Goal Description Patient will report the ability to sleep through the entire night without waking from left shoulder pain.   Rationale to maximize safety and independence with performance of ADLs and functional tasks;to maximize safety and independence within the home;to maximize safety and independence with self cares   Goal Progress Goal met   Target Date 12/19/24   Date Met 03/17/25   PT Goal 2   Goal Identifier Left shoulder range of motion   Goal Description Patient will demonstrate left shoulder flexion at 140 degrees or greater to allow reaching to an overhead shelf without difficulty.   Rationale to maximize safety and independence with performance of ADLs and functional tasks;to maximize safety and independence within the home;to maximize safety and independence within the community   Goal Progress Goal met   Target Date 01/16/25   PT Goal 3   Goal Identifier Weakness   Goal Description Patient will demonstrate improved left shoulder strength to a 4/5 grade or higher to allow lifting a 1 pound object to an overhead shelf without difficulty or limitation.   Rationale to maximize safety and independence with performance of ADLs and functional tasks;to  maximize safety and independence within the home;to maximize safety and independence within the community   Goal Progress Progressing towards goal.   Target Date 03/31/25   Subjective Report   Subjective Report Patient reports he feels ready to proceed with independent home program as well as symptom management techniques at this time.  Patient reports HEP is going well.  Patient states he continues to increase his activities at home.   Objective Measures   Objective Measures Objective Measure 1;Objective Measure 2   Objective Measure 1   Objective Measure L shoulder flexion   Details Standing active flexion= 155 degreees.  Eeetvatth=000 degrees   Objective Measure 2   Objective Measure Left shoulder pain   Details 1-2/10   Therapeutic Procedure/Exercise   Therapeutic Procedures: strength, endurance, ROM, flexibility minutes (91076) 30   Ther Proc 1 - Details UBE x 4 minutes each way.  Pulleys flexion x 2 minutes.   Supine wand flexion x10.  Supine flexion 3# x 20. Supine protraction 3# x 20.  Standing active left shoulder flexion to 90 degrees 3# x 25.  Side ER 2# x 25.   Side Abduction 3# x 25.  Row with blue x 25. IR with blue x 25. Bicep curl 3# x 20-perform 3 times per week.   Skilled Intervention Education, HEP, range of motion,   Patient Response/Progress Patient verbalized and demonstrated understanding of current phase of rehabilitation protocol and HEP.   Education   Learner/Method Patient;Reading;Listening;Demonstration;No Barriers to Learning;Pictures/Video   Plan   Home program shoulder arm hang exercise, active assisted elbow range of motion, scapular retraction, wrist and hand range of motion.  Updated home program 12/9: Active assistive shoulder flexion using wall walk, wand, pulleys.  Isometric: Flexion, abduction, external rotation, internal rotation x 5-10 reps, 5-second hold.  Supine active flexion starting at 90 degrees working through a pain-free arc of motion.  Updated 12/16:Standing active  left shoulder flexion to 90 degrees x 10.-Perform 1 time a day or symptoms allow.  Updated 1/6:Side ER x 10-25. Side Abduction x  10-25.- perform once a day or as symptoms allow.  Updated 1/13:Row with red x 15. IR with red x 15. perform ROM daily, and strengthening 3 days per week. Focus on low weight and high reps with strength exercises, working up to 25 reps before increasing resistance.  Updated 2/10: Supine protraction 10-25 reps.   Updates to plan of care Patient will be discharged in physical therapy this time.  He will proceed with independent home program and symptom management techniques at this time.  Patient was encouraged to contact physical therapy if he does have any additional questions or concerns.  Patient agrees with this plan.   Plan for next session Progress exercises as symptoms allow for appropriate phase of the [facility] orthopedic and sports medicine complex rotator cuff repair protocol.  Manual therapy and modalities as indicated.   Total Session Time   Timed Code Treatment Minutes 30   Total Treatment Time (sum of timed and untimed services) 30         DISCHARGE  Reason for Discharge: Patient has developed the ability to proceed with independent home program and symptom management techniques at this time.        Discharge Plan: Patient to continue home program.    Referring Provider:  Provider Not In System

## 2025-04-09 ENCOUNTER — TELEPHONE (OUTPATIENT)
Dept: FAMILY MEDICINE | Facility: OTHER | Age: 65
End: 2025-04-09
Payer: COMMERCIAL

## 2025-04-09 DIAGNOSIS — R39.198 DIFFICULTY URINATING: ICD-10-CM

## 2025-04-09 NOTE — TELEPHONE ENCOUNTER
"Received a fax from [facility] pharmacy in regards to rx for   tamsulosin (FLOMAX) 0.4 MG capsule 90 capsule 4 3/31/2025 -- No   Sig - Route: Take 1 capsule (0.4 mg) by mouth daily.       Note from pharmacy:  \"Patient was told to take 2 capsules daily. Please send new Rx or confirm sig for one daily\"    Rosanna Roman RN on 4/9/2025 at 4:35 PM    "

## 2025-04-10 RX ORDER — TAMSULOSIN HYDROCHLORIDE 0.4 MG/1
0.8 CAPSULE ORAL DAILY
Qty: 180 CAPSULE | Refills: 4 | Status: SHIPPED | OUTPATIENT
Start: 2025-04-10

## 2025-04-10 NOTE — TELEPHONE ENCOUNTER
Note per TJP on 3/31/25.         Rx changed in PCP absence.    Pastora Melvin PA-C  4/10/2025  6:52 AM

## 2025-08-05 ENCOUNTER — OFFICE VISIT (OUTPATIENT)
Dept: FAMILY MEDICINE | Facility: OTHER | Age: 65
End: 2025-08-05
Attending: NURSE PRACTITIONER
Payer: MEDICARE

## 2025-08-05 VITALS
HEART RATE: 46 BPM | WEIGHT: 285.4 LBS | OXYGEN SATURATION: 96 % | DIASTOLIC BLOOD PRESSURE: 72 MMHG | RESPIRATION RATE: 14 BRPM | BODY MASS INDEX: 36.63 KG/M2 | TEMPERATURE: 98 F | HEIGHT: 74 IN | SYSTOLIC BLOOD PRESSURE: 128 MMHG

## 2025-08-05 DIAGNOSIS — R10.13 ABDOMINAL PAIN, EPIGASTRIC: ICD-10-CM

## 2025-08-05 DIAGNOSIS — I10 ESSENTIAL HYPERTENSION: ICD-10-CM

## 2025-08-05 DIAGNOSIS — K21.00 GASTROESOPHAGEAL REFLUX DISEASE WITH ESOPHAGITIS WITHOUT HEMORRHAGE: Primary | ICD-10-CM

## 2025-08-05 LAB
ALBUMIN SERPL BCG-MCNC: 4.4 G/DL (ref 3.5–5.2)
ALP SERPL-CCNC: 101 U/L (ref 40–150)
ALT SERPL W P-5'-P-CCNC: 30 U/L (ref 0–70)
ANION GAP SERPL CALCULATED.3IONS-SCNC: 10 MMOL/L (ref 7–15)
AST SERPL W P-5'-P-CCNC: 22 U/L (ref 0–45)
BASOPHILS # BLD AUTO: 0.1 10E3/UL (ref 0–0.2)
BASOPHILS NFR BLD AUTO: 1 %
BILIRUB SERPL-MCNC: 0.8 MG/DL
BUN SERPL-MCNC: 21 MG/DL (ref 8–23)
CALCIUM SERPL-MCNC: 9.5 MG/DL (ref 8.8–10.4)
CHLORIDE SERPL-SCNC: 107 MMOL/L (ref 98–107)
CREAT SERPL-MCNC: 1.03 MG/DL (ref 0.67–1.17)
EGFRCR SERPLBLD CKD-EPI 2021: 81 ML/MIN/1.73M2
EOSINOPHIL # BLD AUTO: 0.1 10E3/UL (ref 0–0.7)
EOSINOPHIL NFR BLD AUTO: 2 %
ERYTHROCYTE [DISTWIDTH] IN BLOOD BY AUTOMATED COUNT: 14 % (ref 10–15)
GLUCOSE SERPL-MCNC: 109 MG/DL (ref 70–99)
HCO3 SERPL-SCNC: 26 MMOL/L (ref 22–29)
HCT VFR BLD AUTO: 46.6 % (ref 40–53)
HGB BLD-MCNC: 15.7 G/DL (ref 13.3–17.7)
IMM GRANULOCYTES # BLD: 0 10E3/UL
IMM GRANULOCYTES NFR BLD: 0 %
LIPASE SERPL-CCNC: 23 U/L (ref 13–60)
LYMPHOCYTES # BLD AUTO: 2.3 10E3/UL (ref 0.8–5.3)
LYMPHOCYTES NFR BLD AUTO: 25 %
MCH RBC QN AUTO: 29.7 PG (ref 26.5–33)
MCHC RBC AUTO-ENTMCNC: 33.7 G/DL (ref 31.5–36.5)
MCV RBC AUTO: 88 FL (ref 78–100)
MONOCYTES # BLD AUTO: 0.6 10E3/UL (ref 0–1.3)
MONOCYTES NFR BLD AUTO: 6 %
NEUTROPHILS # BLD AUTO: 6.1 10E3/UL (ref 1.6–8.3)
NEUTROPHILS NFR BLD AUTO: 66 %
NRBC # BLD AUTO: 0 10E3/UL
NRBC BLD AUTO-RTO: 0 /100
PLATELET # BLD AUTO: 237 10E3/UL (ref 150–450)
POTASSIUM SERPL-SCNC: 4.3 MMOL/L (ref 3.4–5.3)
PROT SERPL-MCNC: 7.2 G/DL (ref 6.4–8.3)
RBC # BLD AUTO: 5.28 10E6/UL (ref 4.4–5.9)
SODIUM SERPL-SCNC: 143 MMOL/L (ref 135–145)
WBC # BLD AUTO: 9.2 10E3/UL (ref 4–11)

## 2025-08-05 PROCEDURE — 83690 ASSAY OF LIPASE: CPT | Mod: ZL | Performed by: NURSE PRACTITIONER

## 2025-08-05 PROCEDURE — 36415 COLL VENOUS BLD VENIPUNCTURE: CPT | Mod: ZL | Performed by: NURSE PRACTITIONER

## 2025-08-05 PROCEDURE — G0463 HOSPITAL OUTPT CLINIC VISIT: HCPCS

## 2025-08-05 PROCEDURE — 80053 COMPREHEN METABOLIC PANEL: CPT | Mod: ZL | Performed by: NURSE PRACTITIONER

## 2025-08-05 PROCEDURE — 85004 AUTOMATED DIFF WBC COUNT: CPT | Mod: ZL | Performed by: NURSE PRACTITIONER

## 2025-08-05 RX ORDER — OMEPRAZOLE 20 MG/1
20 CAPSULE, DELAYED RELEASE ORAL DAILY
Qty: 30 CAPSULE | Refills: 1 | Status: SHIPPED | OUTPATIENT
Start: 2025-08-05

## 2025-08-05 ASSESSMENT — PAIN SCALES - GENERAL: PAINLEVEL_OUTOF10: NO PAIN (0)

## 2025-08-18 PROBLEM — M25.519 SHOULDER PAIN: Status: ACTIVE | Noted: 2024-02-02

## 2025-08-18 PROBLEM — I10 PRIMARY HYPERTENSION: Status: ACTIVE | Noted: 2024-02-02

## 2025-08-18 PROBLEM — S76.119A TRAUMATIC RUPTURE OF QUADRICEPS TENDON: Status: ACTIVE | Noted: 2024-01-30

## 2025-08-18 PROBLEM — M25.561 RIGHT KNEE PAIN: Status: ACTIVE | Noted: 2024-02-05

## 2025-08-18 PROBLEM — G47.33 OBSTRUCTIVE SLEEP APNEA SYNDROME IN ADULT: Status: ACTIVE | Noted: 2024-02-02

## 2025-08-18 PROBLEM — E66.9 OBESITY WITH BODY MASS INDEX 30 OR GREATER: Status: ACTIVE | Noted: 2024-02-02

## 2025-08-20 ENCOUNTER — OFFICE VISIT (OUTPATIENT)
Dept: FAMILY MEDICINE | Facility: OTHER | Age: 65
End: 2025-08-20
Attending: FAMILY MEDICINE
Payer: MEDICARE

## 2025-08-20 VITALS
OXYGEN SATURATION: 97 % | TEMPERATURE: 97.5 F | RESPIRATION RATE: 16 BRPM | BODY MASS INDEX: 36.85 KG/M2 | WEIGHT: 287 LBS | SYSTOLIC BLOOD PRESSURE: 148 MMHG | DIASTOLIC BLOOD PRESSURE: 78 MMHG | HEART RATE: 48 BPM

## 2025-08-20 DIAGNOSIS — N50.89 TESTICULAR MASS: Primary | ICD-10-CM

## 2025-08-20 LAB
ALBUMIN UR-MCNC: NEGATIVE MG/DL
APPEARANCE UR: CLEAR
BILIRUB UR QL STRIP: NEGATIVE
COLOR UR AUTO: NORMAL
GLUCOSE UR STRIP-MCNC: NEGATIVE MG/DL
HGB UR QL STRIP: NEGATIVE
KETONES UR STRIP-MCNC: NEGATIVE MG/DL
LEUKOCYTE ESTERASE UR QL STRIP: NEGATIVE
NITRATE UR QL: NEGATIVE
PH UR STRIP: 7 [PH] (ref 5–9)
SP GR UR STRIP: 1.02 (ref 1–1.03)
UROBILINOGEN UR STRIP-MCNC: NORMAL MG/DL

## 2025-08-20 PROCEDURE — 81003 URINALYSIS AUTO W/O SCOPE: CPT | Mod: ZL | Performed by: FAMILY MEDICINE

## 2025-08-20 PROCEDURE — G0463 HOSPITAL OUTPT CLINIC VISIT: HCPCS

## 2025-08-20 ASSESSMENT — PAIN SCALES - GENERAL: PAINLEVEL_OUTOF10: NO PAIN (0)

## 2025-09-03 ENCOUNTER — HOSPITAL ENCOUNTER (OUTPATIENT)
Dept: ULTRASOUND IMAGING | Facility: OTHER | Age: 65
Discharge: HOME | End: 2025-09-03
Attending: NURSE PRACTITIONER
Payer: MEDICARE

## 2025-09-03 DIAGNOSIS — K21.00 GASTROESOPHAGEAL REFLUX DISEASE WITH ESOPHAGITIS WITHOUT HEMORRHAGE: ICD-10-CM

## 2025-09-03 DIAGNOSIS — R10.13 ABDOMINAL PAIN, EPIGASTRIC: ICD-10-CM

## 2025-09-03 PROCEDURE — 76705 ECHO EXAM OF ABDOMEN: CPT | Mod: 26 | Performed by: RADIOLOGY

## 2025-09-03 PROCEDURE — 76705 ECHO EXAM OF ABDOMEN: CPT

## (undated) RX ORDER — OXYCODONE HYDROCHLORIDE 5 MG/1
TABLET ORAL
Status: DISPENSED
Start: 2024-01-29

## (undated) RX ORDER — ACETAMINOPHEN 500 MG
TABLET ORAL
Status: DISPENSED
Start: 2024-01-29